# Patient Record
Sex: MALE | Race: WHITE | NOT HISPANIC OR LATINO | Employment: OTHER | URBAN - METROPOLITAN AREA
[De-identification: names, ages, dates, MRNs, and addresses within clinical notes are randomized per-mention and may not be internally consistent; named-entity substitution may affect disease eponyms.]

---

## 2017-08-01 ENCOUNTER — DOCUMENTATION ONLY (OUTPATIENT)
Dept: PRIMARY CARE CLINIC | Facility: CLINIC | Age: 62
End: 2017-08-01

## 2017-08-04 ENCOUNTER — TELEPHONE (OUTPATIENT)
Dept: PRIMARY CARE CLINIC | Facility: CLINIC | Age: 62
End: 2017-08-04

## 2017-08-04 NOTE — TELEPHONE ENCOUNTER
----- Message from Erendira Salas sent at 8/3/2017 12:10 PM CDT -----  [facility]  Faxed over diabetic testing supplies and Lidocaine ointment on 8/2.  Checking on status.  Please call back at

## 2017-08-04 NOTE — TELEPHONE ENCOUNTER
pts pharmacy sent paper refill request to be filled out,signed, and faxed back. Dr Jacobs signed,and filled out refill request. I faxed back to pharm and sent to scan

## 2017-08-23 ENCOUNTER — TELEPHONE (OUTPATIENT)
Dept: PRIMARY CARE CLINIC | Facility: CLINIC | Age: 62
End: 2017-08-23

## 2017-08-23 NOTE — TELEPHONE ENCOUNTER
----- Message from Serena Bryant sent at 8/23/2017  3:05 PM CDT -----  Contact: Laure with [facility] phone [phone] fax [phone]  Laure with [facility] phone [phone] fax [phone], Calling Dr. Jacobs order outpatient physical therapy when he was discharged for [facility], but order is a occupational therapy diagnosis. Order needs to be corrected for occupational therapy or diagnosis needs to be corrected. Please advise. Thanks.

## 2017-09-05 ENCOUNTER — TELEPHONE (OUTPATIENT)
Dept: PRIMARY CARE CLINIC | Facility: CLINIC | Age: 62
End: 2017-09-05

## 2017-09-05 NOTE — TELEPHONE ENCOUNTER
Left message for patient to return call for abnormal CT results- +RIGHT carotid stenosis 60-70 percent subclavian artery loss, LEFT common carotid artery 50 percent of the vessel, please advise patient to f/u with dr giordano or cardiologist.

## 2017-09-06 ENCOUNTER — OFFICE VISIT (OUTPATIENT)
Dept: PRIMARY CARE CLINIC | Facility: CLINIC | Age: 62
End: 2017-09-06
Payer: MEDICARE

## 2017-09-06 VITALS
SYSTOLIC BLOOD PRESSURE: 92 MMHG | DIASTOLIC BLOOD PRESSURE: 57 MMHG | RESPIRATION RATE: 18 BRPM | OXYGEN SATURATION: 96 % | TEMPERATURE: 98 F | HEART RATE: 78 BPM

## 2017-09-06 DIAGNOSIS — I10 ESSENTIAL HYPERTENSION: ICD-10-CM

## 2017-09-06 DIAGNOSIS — Z89.511 S/P BKA (BELOW KNEE AMPUTATION) BILATERAL: ICD-10-CM

## 2017-09-06 DIAGNOSIS — G62.9 NEUROPATHY: ICD-10-CM

## 2017-09-06 DIAGNOSIS — H43.813 PVD (POSTERIOR VITREOUS DETACHMENT), BILATERAL: ICD-10-CM

## 2017-09-06 DIAGNOSIS — F41.9 ANXIETY: ICD-10-CM

## 2017-09-06 DIAGNOSIS — E78.5 HYPERLIPIDEMIA, UNSPECIFIED HYPERLIPIDEMIA TYPE: ICD-10-CM

## 2017-09-06 DIAGNOSIS — F32.A DEPRESSION, UNSPECIFIED DEPRESSION TYPE: ICD-10-CM

## 2017-09-06 DIAGNOSIS — I25.10 CORONARY ARTERY DISEASE, ANGINA PRESENCE UNSPECIFIED, UNSPECIFIED VESSEL OR LESION TYPE, UNSPECIFIED WHETHER NATIVE OR TRANSPLANTED HEART: ICD-10-CM

## 2017-09-06 DIAGNOSIS — Z95.5 HX OF HEART ARTERY STENT: ICD-10-CM

## 2017-09-06 DIAGNOSIS — L98.9 SKIN LESION: ICD-10-CM

## 2017-09-06 DIAGNOSIS — M51.36 DDD (DEGENERATIVE DISC DISEASE), LUMBAR: ICD-10-CM

## 2017-09-06 DIAGNOSIS — Z72.0 TOBACCO ABUSE: ICD-10-CM

## 2017-09-06 DIAGNOSIS — I50.9 CONGESTIVE HEART FAILURE, UNSPECIFIED CONGESTIVE HEART FAILURE CHRONICITY, UNSPECIFIED CONGESTIVE HEART FAILURE TYPE: ICD-10-CM

## 2017-09-06 DIAGNOSIS — Z89.512 S/P BKA (BELOW KNEE AMPUTATION) BILATERAL: ICD-10-CM

## 2017-09-06 DIAGNOSIS — E53.8 B12 DEFICIENCY: ICD-10-CM

## 2017-09-06 PROBLEM — M51.369 DDD (DEGENERATIVE DISC DISEASE), LUMBAR: Status: ACTIVE | Noted: 2017-09-06

## 2017-09-06 PROCEDURE — 99214 OFFICE O/P EST MOD 30 MIN: CPT | Mod: S$GLB,,, | Performed by: FAMILY MEDICINE

## 2017-09-06 RX ORDER — HYDROCODONE BITARTRATE AND ACETAMINOPHEN 10; 325 MG/1; MG/1
1 TABLET ORAL EVERY 6 HOURS PRN
Refills: 0 | COMMUNITY
Start: 2017-06-12

## 2017-09-06 RX ORDER — METFORMIN HYDROCHLORIDE 1000 MG/1
1000 TABLET ORAL 2 TIMES DAILY WITH MEALS
COMMUNITY
End: 2017-12-07 | Stop reason: SDUPTHER

## 2017-09-06 RX ORDER — ROSUVASTATIN CALCIUM 20 MG/1
1 TABLET, COATED ORAL DAILY
Refills: 1 | COMMUNITY
Start: 2017-05-31 | End: 2018-01-11

## 2017-09-06 RX ORDER — INSULIN ASPART 100 [IU]/ML
2 INJECTION, SOLUTION INTRAVENOUS; SUBCUTANEOUS 2 TIMES DAILY
COMMUNITY
End: 2020-09-24

## 2017-09-06 RX ORDER — ALBUTEROL SULFATE 90 UG/1
2 AEROSOL, METERED RESPIRATORY (INHALATION) DAILY
Refills: 2 | COMMUNITY
Start: 2017-05-31 | End: 2017-12-20

## 2017-09-06 RX ORDER — CARVEDILOL 3.12 MG/1
1 TABLET ORAL DAILY
Refills: 1 | COMMUNITY
Start: 2017-05-31 | End: 2018-08-14 | Stop reason: SDUPTHER

## 2017-09-06 RX ORDER — DIAZEPAM 5 MG/1
1 TABLET ORAL DAILY
Refills: 1 | COMMUNITY
Start: 2017-06-12 | End: 2019-11-04

## 2017-09-06 RX ORDER — BACLOFEN 10 MG/1
10 TABLET ORAL
Qty: 90 TABLET | Refills: 1 | Status: SHIPPED | OUTPATIENT
Start: 2017-09-06 | End: 2017-10-11 | Stop reason: SDUPTHER

## 2017-09-06 RX ORDER — NITROGLYCERIN 0.4 MG/1
1 TABLET SUBLINGUAL
Refills: 1 | Status: ON HOLD | COMMUNITY
Start: 2017-05-31 | End: 2020-05-04 | Stop reason: HOSPADM

## 2017-09-06 RX ORDER — INSULIN GLARGINE 100 [IU]/ML
30 INJECTION, SOLUTION SUBCUTANEOUS 2 TIMES DAILY
COMMUNITY
End: 2018-10-15 | Stop reason: SDUPTHER

## 2017-09-06 RX ORDER — BACLOFEN 10 MG/1
10 TABLET ORAL
Qty: 30 TABLET | Refills: 1 | Status: SHIPPED | OUTPATIENT
Start: 2017-09-06 | End: 2017-09-06 | Stop reason: SDUPTHER

## 2017-09-06 RX ORDER — BACLOFEN 10 MG/1
1 TABLET ORAL
Refills: 1 | COMMUNITY
Start: 2017-05-31 | End: 2017-09-06 | Stop reason: SDUPTHER

## 2017-09-06 NOTE — PROGRESS NOTES
Subjective:       Patient ID: Jordan Mcpherson is a 61 y.o. male.    Chief Complaint: Medication Refill and hospital f/u    HPI: 62 yo WM in for F/U -- seen ER due problem with right hand ??pinched nerve. Went into ER admitted due SBPH-- chest pain -- told Dr Ricardo-- did stress test andf 3 stents in heart , Saw Dr Mao told pt had CVA and disc so back in back if no surgery in 6 mo will be in wheelchair rest of life . Pt has appt pain doctor Dr Benito in [place] to get A[place]IN .     ROS:  Skin: no psoriasis, eczema, skin cancer Leison left shoulder scar tissue size 1/2 dollar with verrucoid like tissue upper area.   HEENT: No headache, ocular pain, blurred vision, diplopia, epistaxis, hoarseness change in voice, thyroid trouble  Lung: No pneumonia, asthma, Tb, wheezing, SOB, Smoking 1 ppd   Heart: No chest pain, ankle edema, palpitations, MI, katherine murmur,+ hypertension, +hyperlipidemia, + CAD stent x 3   Abdomen: No nausea, vomiting, diarrhea, constipation, ulcers, hepatitis, gallbladder disease, melena, hematochezia, hematemesis  : no UTI, renal disease, stones, prostate or venereal dz   MS: no fractures, O/A, lupus, rheumatoid, gout  Neuro: No dizziness, LOC, seizures   + diabetes Glu 112 on insulin 30 units bid and metformin 1000mg bid, , no anemia, + anxiety, + depression   x 3 yrs, 2 sons, disabled from right BKA, bad back.      Objective:   Physical Exam:  General: Well nourished, well developed, no acute distress  Skin: scar like tissue left upper deltoid area size 1/2 dollar with raised upper edge   'HEENT: Eyes PERRLA, EOM intact, nose patent, throat non-erythematous   NECK: Supple, no bruits, No JVD, no nodes  Lungs: Clear, no rales, rhonchi, wheezing  Heart: Regular rate and rhythm, no murmurs, gallops, or rubs  Abdomen: flat, bowel sounds positive, no tenderness, or organomegaly  MS: Range of motion and muscle strength intact  Neuro: Alert, CN intact, oriented X 3  Extremities: No cyanosis,  clubbing, or edema         Assessment:       1. Diabetes mellitus, insulin dependent (IDDM), controlled    2. Essential hypertension    3. Hyperlipidemia, unspecified hyperlipidemia type    4. Coronary artery disease, angina presence unspecified, unspecified vessel or lesion type, unspecified whether native or transplanted heart    5. Hx of heart artery stent    6. Congestive heart failure, unspecified congestive heart failure chronicity, unspecified congestive heart failure type    7. PVD (posterior vitreous detachment), bilateral    8. S/P BKA (below knee amputation) bilateral    9. DDD (degenerative disc disease), lumbar    10. Tobacco abuse    11. Anxiety    12. Depression, unspecified depression type    13. Skin lesion    14. B12 deficiency    15. Neuropathy        Plan:       Diabetes mellitus, insulin dependent (IDDM), controlled  -     Hemoglobin A1c; Future; Expected date: 09/06/2017    Essential hypertension  -     CBC auto differential; Future; Expected date: 09/06/2017  -     Comprehensive metabolic panel; Future; Expected date: 09/06/2017  -     X-Ray Chest PA And Lateral; Future; Expected date: 09/06/2017  -     EKG 12-lead; Future  -     Urinalysis    Hyperlipidemia, unspecified hyperlipidemia type  -     Lipid panel; Future; Expected date: 09/06/2017    Coronary artery disease, angina presence unspecified, unspecified vessel or lesion type, unspecified whether native or transplanted heart    Hx of heart artery stent    Congestive heart failure, unspecified congestive heart failure chronicity, unspecified congestive heart failure type    PVD (posterior vitreous detachment), bilateral    S/P BKA (below knee amputation) bilateral    DDD (degenerative disc disease), lumbar    Tobacco abuse    Anxiety    Depression, unspecified depression type    Skin lesion  -     Ambulatory referral to Dermatology    B12 deficiency    Neuropathy  -     Ambulatory Referral to Neurology    Other orders  -     Discontinue:  baclofen (LIORESAL) 10 MG tablet; Take 1 tablet (10 mg total) by mouth three times daily with food.  Dispense: 30 tablet; Refill: 1  -     baclofen (LIORESAL) 10 MG tablet; Take 1 tablet (10 mg total) by mouth three times daily with food.  Dispense: 90 tablet; Refill: 1      Pt able get HLP med he desires bring in big pill or call in name . See Dr Radha hartman, See Pain clinic, Left artificial limp does not fit .

## 2017-10-11 RX ORDER — BACLOFEN 10 MG/1
10 TABLET ORAL
Qty: 90 TABLET | Refills: 1 | Status: SHIPPED | OUTPATIENT
Start: 2017-10-11 | End: 2018-01-08 | Stop reason: SDUPTHER

## 2017-12-07 NOTE — TELEPHONE ENCOUNTER
----- Message from Jil Gilmore sent at 12/7/2017  4:17 PM CST -----  Contact: Lisa Gonzalez called regarding faxes for Dwo submitted couple times with no response. Submitted first time, two weeks ago and second time a week ago. Left multiple messages, no response. Please contact [phone] ext245

## 2017-12-09 RX ORDER — METFORMIN HYDROCHLORIDE 1000 MG/1
TABLET ORAL
Qty: 60 TABLET | Refills: 5 | Status: SHIPPED | OUTPATIENT
Start: 2017-12-09 | End: 2018-10-06 | Stop reason: SDUPTHER

## 2017-12-09 NOTE — TELEPHONE ENCOUNTER
Called so patient needs to do lab as an computer.given 4th months refills give time to get lab and come in and get seen.  Last refill without being seen

## 2018-01-11 ENCOUNTER — OFFICE VISIT (OUTPATIENT)
Dept: PRIMARY CARE CLINIC | Facility: CLINIC | Age: 63
End: 2018-01-11
Payer: MEDICARE

## 2018-01-11 VITALS
HEART RATE: 81 BPM | TEMPERATURE: 98 F | SYSTOLIC BLOOD PRESSURE: 142 MMHG | RESPIRATION RATE: 18 BRPM | WEIGHT: 170 LBS | BODY MASS INDEX: 23.8 KG/M2 | HEIGHT: 71 IN | OXYGEN SATURATION: 96 % | DIASTOLIC BLOOD PRESSURE: 72 MMHG

## 2018-01-11 DIAGNOSIS — R05.3 CHRONIC COUGH: ICD-10-CM

## 2018-01-11 DIAGNOSIS — J20.9 ACUTE BRONCHITIS, UNSPECIFIED ORGANISM: ICD-10-CM

## 2018-01-11 DIAGNOSIS — Z72.0 TOBACCO ABUSE: ICD-10-CM

## 2018-01-11 DIAGNOSIS — J20.9 ACUTE BRONCHITIS, UNSPECIFIED ORGANISM: Primary | ICD-10-CM

## 2018-01-11 PROCEDURE — 99214 OFFICE O/P EST MOD 30 MIN: CPT | Mod: S$GLB,,, | Performed by: FAMILY MEDICINE

## 2018-01-11 PROCEDURE — 99999 PR PBB SHADOW E&M-EST. PATIENT-LVL III: CPT | Mod: PBBFAC,,, | Performed by: FAMILY MEDICINE

## 2018-01-11 RX ORDER — PREDNISONE 20 MG/1
TABLET ORAL
Qty: 15 TABLET | Refills: 0 | Status: SHIPPED | OUTPATIENT
Start: 2018-01-11 | End: 2018-05-31

## 2018-01-11 RX ORDER — FOLIC ACID 1 MG/1
1 TABLET ORAL DAILY
Refills: 5 | COMMUNITY
Start: 2018-01-05 | End: 2018-10-06 | Stop reason: SDUPTHER

## 2018-01-11 RX ORDER — DOXYCYCLINE 100 MG/1
100 CAPSULE ORAL EVERY 12 HOURS
Qty: 20 CAPSULE | Refills: 0 | Status: SHIPPED | OUTPATIENT
Start: 2018-01-11 | End: 2018-01-21

## 2018-01-11 RX ORDER — ATORVASTATIN CALCIUM 80 MG/1
1 TABLET, FILM COATED ORAL DAILY
Status: ON HOLD | COMMUNITY
Start: 2018-01-05 | End: 2020-05-04 | Stop reason: SDUPTHER

## 2018-01-11 RX ORDER — CLOPIDOGREL BISULFATE 75 MG/1
1 TABLET ORAL DAILY
COMMUNITY
Start: 2018-01-05 | End: 2018-04-17 | Stop reason: SDUPTHER

## 2018-01-11 RX ORDER — LISINOPRIL 2.5 MG/1
1 TABLET ORAL DAILY
COMMUNITY
Start: 2018-01-05 | End: 2018-04-17 | Stop reason: SDUPTHER

## 2018-01-11 RX ORDER — ALBUTEROL SULFATE 90 UG/1
2 AEROSOL, METERED RESPIRATORY (INHALATION) EVERY 4 HOURS PRN
Qty: 18 G | Refills: 2 | Status: ON HOLD | OUTPATIENT
Start: 2018-01-11 | End: 2022-05-08 | Stop reason: CLARIF

## 2018-01-13 RX ORDER — BACLOFEN 10 MG/1
10 TABLET ORAL
Qty: 90 TABLET | Refills: 1 | Status: SHIPPED | OUTPATIENT
Start: 2018-01-13 | End: 2018-03-13 | Stop reason: SDUPTHER

## 2018-01-13 NOTE — TELEPHONE ENCOUNTER
Tell patient  6 months refills baclofen called in.  Needs to do lab every 6 months and diabetes needs CBC CMP lipid hemoglobin A1c.  Each year needs urinalysis and urine micral protein.  If over 2 years needs chest x-ray EKG

## 2018-03-13 RX ORDER — BACLOFEN 10 MG/1
10 TABLET ORAL
Qty: 90 TABLET | Refills: 1 | Status: SHIPPED | OUTPATIENT
Start: 2018-03-13 | End: 2018-07-11

## 2018-03-13 NOTE — TELEPHONE ENCOUNTER
Pt had lab orderednSept  2017 b never done Seen Jan 2018 needs get lab next 3 mo see me 2 weeks later Tell in staciee get refills with office visits prevent unnecessary phone calls

## 2018-04-17 RX ORDER — LISINOPRIL 2.5 MG/1
2.5 TABLET ORAL DAILY
Qty: 90 TABLET | Refills: 1 | Status: SHIPPED | OUTPATIENT
Start: 2018-04-17 | End: 2018-07-23 | Stop reason: SDUPTHER

## 2018-04-17 RX ORDER — CLOPIDOGREL BISULFATE 75 MG/1
75 TABLET ORAL DAILY
Qty: 90 TABLET | Refills: 1 | Status: SHIPPED | OUTPATIENT
Start: 2018-04-17 | End: 2018-07-23 | Stop reason: SDUPTHER

## 2018-04-17 RX ORDER — ROSUVASTATIN CALCIUM 20 MG/1
TABLET, COATED ORAL
Qty: 90 TABLET | Refills: 1 | Status: SHIPPED | OUTPATIENT
Start: 2018-04-17 | End: 2018-07-11

## 2018-04-17 NOTE — TELEPHONE ENCOUNTER
----- Message from Erum Amos sent at 4/17/2018  1:13 PM CDT -----  Contact: [facility]  Not originally written by Dr. Morley  Requesting a refill on   Rx clopidogrel (PLAVIX) 75 mg tablet 30 days qty  Rx lisinopril (PRINIVIL,ZESTRIL) 2.5 MG tablet 30 day qty    [facility] [address]  [address] [place] [address]  Phone: [phone] Fax: [phone]    thanks

## 2018-07-11 ENCOUNTER — OFFICE VISIT (OUTPATIENT)
Dept: PRIMARY CARE CLINIC | Facility: CLINIC | Age: 63
End: 2018-07-11
Payer: MEDICARE

## 2018-07-11 VITALS
RESPIRATION RATE: 18 BRPM | HEIGHT: 70 IN | DIASTOLIC BLOOD PRESSURE: 71 MMHG | OXYGEN SATURATION: 99 % | WEIGHT: 175 LBS | SYSTOLIC BLOOD PRESSURE: 139 MMHG | TEMPERATURE: 98 F | HEART RATE: 84 BPM | BODY MASS INDEX: 25.05 KG/M2

## 2018-07-11 DIAGNOSIS — R53.83 FATIGUE, UNSPECIFIED TYPE: ICD-10-CM

## 2018-07-11 DIAGNOSIS — E78.5 TYPE 2 DIABETES MELLITUS WITH HYPERLIPIDEMIA: ICD-10-CM

## 2018-07-11 DIAGNOSIS — I10 ESSENTIAL HYPERTENSION: ICD-10-CM

## 2018-07-11 DIAGNOSIS — E78.5 HYPERLIPIDEMIA, UNSPECIFIED HYPERLIPIDEMIA TYPE: ICD-10-CM

## 2018-07-11 DIAGNOSIS — Z89.512 S/P BKA (BELOW KNEE AMPUTATION) BILATERAL: ICD-10-CM

## 2018-07-11 DIAGNOSIS — Z00.00 ANNUAL PHYSICAL EXAM: Primary | ICD-10-CM

## 2018-07-11 DIAGNOSIS — Z12.5 PROSTATE CANCER SCREENING: ICD-10-CM

## 2018-07-11 DIAGNOSIS — I25.10 CORONARY ARTERY DISEASE, ANGINA PRESENCE UNSPECIFIED, UNSPECIFIED VESSEL OR LESION TYPE, UNSPECIFIED WHETHER NATIVE OR TRANSPLANTED HEART: ICD-10-CM

## 2018-07-11 DIAGNOSIS — Z89.511 S/P BKA (BELOW KNEE AMPUTATION) BILATERAL: ICD-10-CM

## 2018-07-11 DIAGNOSIS — D48.5 NEOPLASM OF UNCERTAIN BEHAVIOR OF SKIN: ICD-10-CM

## 2018-07-11 DIAGNOSIS — E53.8 B12 DEFICIENCY: ICD-10-CM

## 2018-07-11 DIAGNOSIS — Z72.0 TOBACCO ABUSE: ICD-10-CM

## 2018-07-11 DIAGNOSIS — E11.69 TYPE 2 DIABETES MELLITUS WITH HYPERLIPIDEMIA: ICD-10-CM

## 2018-07-11 DIAGNOSIS — Z23 NEED FOR VACCINATION: ICD-10-CM

## 2018-07-11 DIAGNOSIS — Z11.59 NEED FOR HEPATITIS C SCREENING TEST: ICD-10-CM

## 2018-07-11 DIAGNOSIS — Z12.11 COLON CANCER SCREENING: ICD-10-CM

## 2018-07-11 DIAGNOSIS — I73.9 PAD (PERIPHERAL ARTERY DISEASE): ICD-10-CM

## 2018-07-11 PROCEDURE — 3078F DIAST BP <80 MM HG: CPT | Mod: CPTII,S$GLB,, | Performed by: FAMILY MEDICINE

## 2018-07-11 PROCEDURE — 99396 PREV VISIT EST AGE 40-64: CPT | Mod: S$GLB,,, | Performed by: FAMILY MEDICINE

## 2018-07-11 PROCEDURE — 3075F SYST BP GE 130 - 139MM HG: CPT | Mod: CPTII,S$GLB,, | Performed by: FAMILY MEDICINE

## 2018-07-11 PROCEDURE — G0009 ADMIN PNEUMOCOCCAL VACCINE: HCPCS | Mod: S$GLB,,, | Performed by: FAMILY MEDICINE

## 2018-07-11 PROCEDURE — 90732 PPSV23 VACC 2 YRS+ SUBQ/IM: CPT | Mod: S$GLB,,, | Performed by: FAMILY MEDICINE

## 2018-07-11 PROCEDURE — 99999 PR PBB SHADOW E&M-EST. PATIENT-LVL V: CPT | Mod: PBBFAC,,, | Performed by: FAMILY MEDICINE

## 2018-07-11 RX ORDER — BACLOFEN 20 MG/1
20 TABLET ORAL
Refills: 1 | COMMUNITY
Start: 2018-07-06

## 2018-07-11 NOTE — PROGRESS NOTES
"Subjective:       Patient ID: Jordan Mcpherson is a 62 y.o. male.    Chief Complaint: Annual Exam    Here for annual checkup.  Has not been checking his blood sugar and months because he says he does not have a little calmer.  Says he is taking his medications as prescribed.  Underwent left heart catheterization by Dr. Ricardo late last year, had 3 stents placed.  Since then, he denies any recurrent chest pain or shortness of breath.  However, he continues to smoke heavily, about 1 and half packs daily, says he has no interest in quitting smoking.  Has chronic back pain, followed by pain management.  Patient is status post bilateral below-the-knee amputations due to complications from peripheral arterial disease.      Review of Systems   Constitutional: Positive for fatigue. Negative for chills and fever.   HENT: Negative for congestion.    Eyes: Negative for visual disturbance.   Respiratory: Negative for cough and shortness of breath.    Cardiovascular: Negative for chest pain and palpitations.   Gastrointestinal: Positive for constipation. Negative for abdominal pain, blood in stool, nausea and vomiting.   Endocrine: Negative for polydipsia and polyuria.   Genitourinary: Negative for difficulty urinating.   Musculoskeletal: Positive for arthralgias and back pain.   Skin: Negative for rash.   Neurological: Negative for dizziness.   Hematological: Bruises/bleeds easily.   Psychiatric/Behavioral: Negative for self-injury and suicidal ideas. The patient is nervous/anxious.        Objective:      Vitals:    07/11/18 1333   BP: 139/71   BP Location: Left arm   Patient Position: Sitting   BP Method: Medium (Automatic)   Pulse: 84   Resp: 18   Temp: 98.2 °F (36.8 °C)   TempSrc: Oral   SpO2: 99%   Weight: 79.4 kg (175 lb)   Height: 5' 10" (1.778 m)     Physical Exam   Constitutional: He is oriented to person, place, and time. He appears well-developed and well-nourished.   HENT:   Head: Normocephalic and atraumatic. "   Mouth/Throat: Oropharynx is clear and moist.   Eyes: EOM are normal. Pupils are equal, round, and reactive to light.   Neck: Neck supple. No JVD present. Carotid bruit is not present.   Cardiovascular: Normal rate, regular rhythm and normal heart sounds.    Pulses:       Radial pulses are 2+ on the right side, and 2+ on the left side.   Pulmonary/Chest: Effort normal and breath sounds normal.   Abdominal: Soft. Bowel sounds are normal. There is no tenderness.   Musculoskeletal:   Bilateral BKA with right-sided prosthesis   Neurological: He is alert and oriented to person, place, and time.   Skin: Skin is warm and dry.   Erythematous, crusted lesion to helix of left ear   Psychiatric: He has a normal mood and affect. His behavior is normal.   Nursing note and vitals reviewed.      Assessment:       1. Annual physical exam    2. Need for hepatitis C screening test    3. Colon cancer screening    4. Prostate cancer screening    5. Need for vaccination    6. Type 2 diabetes mellitus with hyperlipidemia    7. B12 deficiency    8. Hyperlipidemia, unspecified hyperlipidemia type    9. Coronary artery disease, angina presence unspecified, unspecified vessel or lesion type, unspecified whether native or transplanted heart    10. Essential hypertension    11. S/P BKA (below knee amputation) bilateral    12. PAD (peripheral artery disease)    13. Tobacco abuse    14. Fatigue, unspecified type    15. Neoplasm of uncertain behavior of skin        Plan:       Annual physical exam  -     Cancel: CBC auto differential; Future; Expected date: 07/11/2018  -     Comprehensive metabolic panel; Future; Expected date: 07/11/2018  -     Lipid panel; Future; Expected date: 07/11/2018  -     Hemoglobin A1c; Future; Expected date: 07/11/2018  -     TSH; Future; Expected date: 07/11/2018  -     PSA, Screening; Future; Expected date: 07/11/2018  -     CBC auto differential; Future; Expected date: 07/11/2018    Need for hepatitis C screening  test  -     Hepatitis C antibody; Future; Expected date: 07/11/2018    Colon cancer screening  -     Ambulatory referral to Colorectal Surgery    Prostate cancer screening  -     PSA, Screening; Future; Expected date: 07/11/2018    Need for vaccination  -     Pneumococcal Polysaccharide Vaccine (23 Valent) (SQ/IM)    Type 2 diabetes mellitus with hyperlipidemia  -     Hemoglobin A1c; Future; Expected date: 07/11/2018  -     Microalbumin/creatinine urine ratio; Future; Expected date: 07/11/2018  -     Ambulatory Referral to Diabetes Education    B12 deficiency  -     Vitamin B12; Future; Expected date: 07/11/2018    Hyperlipidemia, unspecified hyperlipidemia type  -     Comprehensive metabolic panel; Future; Expected date: 07/11/2018  -     Lipid panel; Future; Expected date: 07/11/2018  -     TSH; Future; Expected date: 07/11/2018    Coronary artery disease, angina presence unspecified, unspecified vessel or lesion type, unspecified whether native or transplanted heart  -     Cancel: CBC auto differential; Future; Expected date: 07/11/2018  -     CBC auto differential; Future; Expected date: 07/11/2018    Essential hypertension    S/P BKA (below knee amputation) bilateral    PAD (peripheral artery disease)    Tobacco abuse  Comments:  pt refuses to quit, convinced he will die or get cancer if he quits due to family members' experiences  Orders:  -     Ambulatory referral to Smoking Cessation Program    Fatigue, unspecified type  -     TESTOSTERONE PANEL; Future; Expected date: 07/11/2018    Neoplasm of uncertain behavior of skin  -     Ambulatory referral to Dermatology      Medication List with Changes/Refills   Current Medications    ALBUTEROL (VENTOLIN HFA) 90 MCG/ACTUATION INHALER    Inhale 2 puffs into the lungs every 4 (four) hours as needed for Wheezing or Shortness of Breath.    ASPIRIN (ECOTRIN) 81 MG EC TABLET    Take 81 mg by mouth once daily.    ATORVASTATIN (LIPITOR) 80 MG TABLET    Take 1 tablet by  mouth once daily.    BACLOFEN (LIORESAL) 20 MG TABLET    Take 20 mg by mouth 4 (four) times daily.     CARVEDILOL (COREG) 3.125 MG TABLET    Take 1 tablet by mouth once daily.    CLOPIDOGREL (PLAVIX) 75 MG TABLET    Take 1 tablet (75 mg total) by mouth once daily.    DIAZEPAM (VALIUM) 5 MG TABLET    Take 1 tablet by mouth once daily.    FOLIC ACID (FOLVITE) 1 MG TABLET    Take 1 tablet by mouth once daily.    HYDROCODONE-ACETAMINOPHEN 10-325MG (NORCO)  MG TAB    Take 1 tablet by mouth once daily.    INSULIN ASPART (NOVOLOG FLEXPEN) 100 UNIT/ML INPN PEN    Inject 2 Units into the skin 2 (two) times daily.    INSULIN GLARGINE (LANTUS SOLOSTAR) 100 UNIT/ML (3 ML) INPN PEN    Inject 30 Units into the skin 2 (two) times daily.    LISINOPRIL (PRINIVIL,ZESTRIL) 2.5 MG TABLET    Take 1 tablet (2.5 mg total) by mouth once daily.    METFORMIN (GLUCOPHAGE) 1000 MG TABLET    TAKE ONE TABLET BY MOUTH TWICE DAILY WITH MEALS    NITROGLYCERIN (NITROSTAT) 0.4 MG SL TABLET    Place 1 tablet under the tongue as needed.   Discontinued Medications    BACLOFEN (LIORESAL) 10 MG TABLET    TAKE 1 TABLET (10 MG TOTAL) BY MOUTH THREE TIMES DAILY WITH FOOD.    LACTULOSE (CHRONULAC) 20 GRAM/30 ML SOLN    Take 15 mLs (10 g total) by mouth 2 (two) times daily as needed (Constipation).    ONDANSETRON (ZOFRAN) 4 MG TABLET    Take 1 tablet (4 mg total) by mouth every 6 (six) hours as needed.    ROSUVASTATIN (CRESTOR) 20 MG TABLET    TAKE ONE TABLET BY MOUTH DAILY FOR CHOLESTEROL. STOP SIMVASTATIN

## 2018-07-11 NOTE — PROGRESS NOTES
Patient ID by name and . NKDA. Pneumonia 23 vaccine given IM in right deltoid using aseptic technique. Aspirated with no blood noted. Patient tolerated well. Given per physicians order. No adverse reaction noted.

## 2018-07-25 ENCOUNTER — TELEPHONE (OUTPATIENT)
Dept: PRIMARY CARE CLINIC | Facility: CLINIC | Age: 63
End: 2018-07-25

## 2018-07-25 NOTE — TELEPHONE ENCOUNTER
Called pt. To let him no that he still should have a 90 day refill at [facility]. No answer left  Detailed vm stating he still has a refill.

## 2018-07-25 NOTE — TELEPHONE ENCOUNTER
----- Message from Richard Tomas sent at 7/25/2018  9:25 AM CDT -----  Contact: self   Patient called to check status of rx lisinopril (PRINIVIL,ZESTRIL) 2.5 MG tablet that he needs a refill on. Please send to [facility]. Please call back at [phone] (home)          [facility] - [facility] [address]  [address] [place] [address]  Phone: [phone] Fax: [phone]

## 2018-07-26 RX ORDER — LISINOPRIL 2.5 MG/1
TABLET ORAL
Qty: 90 TABLET | Refills: 1 | Status: SHIPPED | OUTPATIENT
Start: 2018-07-26 | End: 2019-02-11 | Stop reason: SDUPTHER

## 2018-07-26 RX ORDER — CLOPIDOGREL BISULFATE 75 MG/1
TABLET ORAL
Qty: 90 TABLET | Refills: 1 | Status: SHIPPED | OUTPATIENT
Start: 2018-07-26 | End: 2019-02-11 | Stop reason: SDUPTHER

## 2018-08-14 ENCOUNTER — OFFICE VISIT (OUTPATIENT)
Dept: PRIMARY CARE CLINIC | Facility: CLINIC | Age: 63
End: 2018-08-14
Payer: MEDICARE

## 2018-08-14 VITALS
TEMPERATURE: 98 F | OXYGEN SATURATION: 95 % | SYSTOLIC BLOOD PRESSURE: 125 MMHG | WEIGHT: 170 LBS | HEIGHT: 70 IN | DIASTOLIC BLOOD PRESSURE: 73 MMHG | HEART RATE: 74 BPM | BODY MASS INDEX: 24.34 KG/M2 | RESPIRATION RATE: 18 BRPM

## 2018-08-14 DIAGNOSIS — E11.29 MICROALBUMINURIA DUE TO TYPE 2 DIABETES MELLITUS: ICD-10-CM

## 2018-08-14 DIAGNOSIS — E11.69 TYPE 2 DIABETES MELLITUS WITH HYPERLIPIDEMIA: Primary | ICD-10-CM

## 2018-08-14 DIAGNOSIS — I10 ESSENTIAL HYPERTENSION: ICD-10-CM

## 2018-08-14 DIAGNOSIS — R80.9 MICROALBUMINURIA DUE TO TYPE 2 DIABETES MELLITUS: ICD-10-CM

## 2018-08-14 DIAGNOSIS — E53.8 B12 DEFICIENCY: ICD-10-CM

## 2018-08-14 DIAGNOSIS — E78.5 TYPE 2 DIABETES MELLITUS WITH HYPERLIPIDEMIA: Primary | ICD-10-CM

## 2018-08-14 PROCEDURE — 3078F DIAST BP <80 MM HG: CPT | Mod: CPTII,S$GLB,, | Performed by: FAMILY MEDICINE

## 2018-08-14 PROCEDURE — 96372 THER/PROPH/DIAG INJ SC/IM: CPT | Mod: S$GLB,,, | Performed by: FAMILY MEDICINE

## 2018-08-14 PROCEDURE — 99214 OFFICE O/P EST MOD 30 MIN: CPT | Mod: 25,S$GLB,, | Performed by: FAMILY MEDICINE

## 2018-08-14 PROCEDURE — 99999 PR PBB SHADOW E&M-EST. PATIENT-LVL III: CPT | Mod: PBBFAC,,, | Performed by: FAMILY MEDICINE

## 2018-08-14 PROCEDURE — 3074F SYST BP LT 130 MM HG: CPT | Mod: CPTII,S$GLB,, | Performed by: FAMILY MEDICINE

## 2018-08-14 PROCEDURE — 3008F BODY MASS INDEX DOCD: CPT | Mod: CPTII,S$GLB,, | Performed by: FAMILY MEDICINE

## 2018-08-14 PROCEDURE — 3046F HEMOGLOBIN A1C LEVEL >9.0%: CPT | Mod: CPTII,S$GLB,, | Performed by: FAMILY MEDICINE

## 2018-08-14 RX ORDER — CARVEDILOL 3.12 MG/1
3.12 TABLET ORAL 2 TIMES DAILY
Qty: 180 TABLET | Refills: 1 | Status: SHIPPED | OUTPATIENT
Start: 2018-08-14 | End: 2019-01-02 | Stop reason: SDUPTHER

## 2018-08-14 RX ORDER — CYANOCOBALAMIN 1000 UG/ML
1000 INJECTION, SOLUTION INTRAMUSCULAR; SUBCUTANEOUS
Status: COMPLETED | OUTPATIENT
Start: 2018-08-14 | End: 2018-08-14

## 2018-08-14 RX ORDER — CYANOCOBALAMIN 1000 UG/ML
INJECTION, SOLUTION INTRAMUSCULAR; SUBCUTANEOUS
Qty: 10 ML | Refills: 5 | Status: SHIPPED | OUTPATIENT
Start: 2018-08-14 | End: 2018-09-05 | Stop reason: ALTCHOICE

## 2018-08-14 RX ADMIN — CYANOCOBALAMIN 1000 MCG: 1000 INJECTION, SOLUTION INTRAMUSCULAR; SUBCUTANEOUS at 04:08

## 2018-08-14 NOTE — PROGRESS NOTES
Pt ID verified by  and Name. Allergies verified. B12 1cc administered IM to R deltoid per MD order. No adverse reactions noted. Pt tolerated well.

## 2018-08-14 NOTE — PROGRESS NOTES
"Subjective:       Patient ID: Jordan Mcpherson is a 62 y.o. male.    Chief Complaint: Diabetes (patient is here to discuss lab results )    Diabetes   He presents for his follow-up diabetic visit. He has type 2 diabetes mellitus. There are no hypoglycemic associated symptoms. Pertinent negatives for hypoglycemia include no confusion. Pertinent negatives for diabetes include no chest pain, no polydipsia and no polyuria. There are no hypoglycemic complications. Symptoms are stable. Diabetic complications include peripheral neuropathy and PVD. He is compliant with treatment most of the time. His weight is stable. There is no change in his home blood glucose trend. His breakfast blood glucose range is generally 180-200 mg/dl. An ACE inhibitor/angiotensin II receptor blocker is being taken.    Mild microalbuminuria on recent urinalysis.  Already on Ace inhibitor.  CBC within normal limits, but B12 level low.  Hypertension-compliant with medications, blood pressure well controlled  Review of Systems   Constitutional: Negative for fever and unexpected weight change.   HENT: Negative for trouble swallowing.    Eyes: Negative for visual disturbance.   Respiratory: Negative for shortness of breath.    Cardiovascular: Negative for chest pain.   Endocrine: Negative for polydipsia and polyuria.   Genitourinary: Negative for difficulty urinating.   Musculoskeletal: Positive for back pain.   Skin: Negative for rash.   Neurological: Positive for numbness.   Psychiatric/Behavioral: Negative for agitation and confusion.       Objective:      Vitals:    08/14/18 1528   BP: 125/73   BP Location: Right arm   Patient Position: Sitting   BP Method: Medium (Automatic)   Pulse: 74   Resp: 18   Temp: 97.9 °F (36.6 °C)   TempSrc: Oral   SpO2: 95%   Weight: 77.1 kg (170 lb)   Height: 5' 10" (1.778 m)     Lab Results   Component Value Date    WBC 11.00 07/13/2018    HGB 14.6 07/13/2018    HCT 43.0 07/13/2018     07/13/2018    CHOL 84 " 07/13/2018    TRIG 77 07/13/2018    HDL 34 (L) 07/13/2018    ALT 22 07/13/2018    AST 22 07/13/2018     (L) 07/13/2018    K 4.5 07/13/2018     07/13/2018    CREATININE 0.8 07/13/2018    BUN 20 07/13/2018    CO2 28 07/13/2018    TSH 1.98 07/13/2018    PSA 0.67 07/13/2018    HGBA1C 9.5 (H) 07/13/2018     Physical Exam   Constitutional: He is oriented to person, place, and time. He appears well-developed and well-nourished.   HENT:   Head: Normocephalic and atraumatic.   Mouth/Throat: Oropharynx is clear and moist.   Eyes: EOM are normal. Pupils are equal, round, and reactive to light.   Neck: Neck supple. No JVD present. Carotid bruit is not present.   Cardiovascular: Normal rate, regular rhythm and normal heart sounds.   Pulses:       Radial pulses are 2+ on the right side, and 2+ on the left side.   Pulmonary/Chest: Effort normal and breath sounds normal.   Abdominal: Soft. Bowel sounds are normal. There is no tenderness.   Musculoskeletal:   Bilateral BKA with right-sided prosthesis   Neurological: He is alert and oriented to person, place, and time.   Skin: Skin is warm and dry.   Erythematous, crusted lesion to helix of left ear   Psychiatric: He has a normal mood and affect. His behavior is normal.   Nursing note and vitals reviewed.      Assessment:       1. Type 2 diabetes mellitus with hyperlipidemia    2. B12 deficiency    3. Microalbuminuria due to type 2 diabetes mellitus    4. Essential hypertension        Plan:       Type 2 diabetes mellitus with hyperlipidemia  -     empagliflozin (JARDIANCE) 25 mg Tab; Take 25 mg by mouth once daily.  Dispense: 30 tablet; Refill: 5  -     Basic metabolic panel; Future; Expected date: 11/14/2018  -     Hemoglobin A1c; Future; Expected date: 11/14/2018    B12 deficiency  -     cyanocobalamin 1,000 mcg/mL injection; 1 cc SQ daily x 1 week, weekly x 4 weeks, then once a month  Dispense: 10 mL; Refill: 5  -     CBC auto differential; Future; Expected date:  11/14/2018  -     Vitamin B12; Future; Expected date: 11/14/2018  -     cyanocobalamin injection 1,000 mcg; Inject 1 mL (1,000 mcg total) into the muscle one time.    Microalbuminuria due to type 2 diabetes mellitus    Essential hypertension  -     carvedilol (COREG) 3.125 MG tablet; Take 1 tablet (3.125 mg total) by mouth 2 (two) times daily.  Dispense: 180 tablet; Refill: 1      Medication List with Changes/Refills   New Medications    CYANOCOBALAMIN 1,000 MCG/ML INJECTION    1 cc SQ daily x 1 week, weekly x 4 weeks, then once a month    EMPAGLIFLOZIN (JARDIANCE) 25 MG TAB    Take 25 mg by mouth once daily.   Current Medications    ALBUTEROL (VENTOLIN HFA) 90 MCG/ACTUATION INHALER    Inhale 2 puffs into the lungs every 4 (four) hours as needed for Wheezing or Shortness of Breath.    ASPIRIN (ECOTRIN) 81 MG EC TABLET    Take 81 mg by mouth once daily.    ATORVASTATIN (LIPITOR) 80 MG TABLET    Take 1 tablet by mouth once daily.    BACLOFEN (LIORESAL) 20 MG TABLET    Take 20 mg by mouth 4 (four) times daily.     BLOOD SUGAR DIAGNOSTIC (TRUE METRIX GLUCOSE TEST STRIP) STRP    1 strip by Misc.(Non-Drug; Combo Route) route 2 (two) times daily before meals.    BLOOD-GLUCOSE METER (TRUE METRIX GLUCOSE METER) MISC    1 kit by Misc.(Non-Drug; Combo Route) route 2 (two) times daily.    CLOPIDOGREL (PLAVIX) 75 MG TABLET    TAKE ONE TABLET BY MOUTH ONCE DAILY    DIAZEPAM (VALIUM) 5 MG TABLET    Take 1 tablet by mouth once daily.    FOLIC ACID (FOLVITE) 1 MG TABLET    Take 1 tablet by mouth once daily.    HYDROCODONE-ACETAMINOPHEN 10-325MG (NORCO)  MG TAB    Take 1 tablet by mouth once daily.    INSULIN ASPART (NOVOLOG FLEXPEN) 100 UNIT/ML INPN PEN    Inject 2 Units into the skin 2 (two) times daily.    INSULIN GLARGINE (LANTUS SOLOSTAR) 100 UNIT/ML (3 ML) INPN PEN    Inject 30 Units into the skin 2 (two) times daily.    LANCETS (TRUEPLUS LANCETS) 33 GAUGE MISC    1 lancet by Misc.(Non-Drug; Combo Route) route 2 (two)  times daily before meals.    LISINOPRIL (PRINIVIL,ZESTRIL) 2.5 MG TABLET    TAKE ONE TABLET BY MOUTH ONCE DAILY    METFORMIN (GLUCOPHAGE) 1000 MG TABLET    TAKE ONE TABLET BY MOUTH TWICE DAILY WITH MEALS    NITROGLYCERIN (NITROSTAT) 0.4 MG SL TABLET    Place 1 tablet under the tongue as needed.   Changed and/or Refilled Medications    Modified Medication Previous Medication    CARVEDILOL (COREG) 3.125 MG TABLET carvedilol (COREG) 3.125 MG tablet       Take 1 tablet (3.125 mg total) by mouth 2 (two) times daily.    Take 1 tablet by mouth once daily.

## 2018-08-17 ENCOUNTER — TELEPHONE (OUTPATIENT)
Dept: PRIMARY CARE CLINIC | Facility: CLINIC | Age: 63
End: 2018-08-17

## 2018-08-17 DIAGNOSIS — E53.8 B12 DEFICIENCY: Primary | ICD-10-CM

## 2018-08-17 NOTE — TELEPHONE ENCOUNTER
----- Message from Serena Bryant sent at 8/17/2018 10:00 AM CDT -----  Contact: Patient  Type: Needs Medical Advice    Who Called:  Jordan patient  Symptoms (please be specific):  N/A  How long has patient had these symptoms:  N/A  Pharmacy name and phone #:    [facility] - [address]  [address] [place] [address]  Phone: [phone] Fax: [phone]  Best Call Back Number: [phone]  Additional Information: Calling because Rx cyanocobalamin 1,000 mcg/mL injection needs a  prior authorization. Please advise. Thanks.

## 2018-09-05 ENCOUNTER — CLINICAL SUPPORT (OUTPATIENT)
Dept: PRIMARY CARE CLINIC | Facility: CLINIC | Age: 63
End: 2018-09-05
Payer: MEDICARE

## 2018-09-05 DIAGNOSIS — E53.8 VITAMIN B12 DEFICIENCY DISEASE: Primary | ICD-10-CM

## 2018-09-05 PROCEDURE — 96372 THER/PROPH/DIAG INJ SC/IM: CPT | Mod: PBBFAC,PN

## 2018-09-05 RX ORDER — METFORMIN HYDROCHLORIDE 1000 MG/1
TABLET ORAL
Qty: 60 TABLET | Refills: 5 | OUTPATIENT
Start: 2018-09-05

## 2018-09-05 RX ORDER — FOLIC ACID 1 MG/1
TABLET ORAL
Qty: 100 TABLET | Refills: 5 | OUTPATIENT
Start: 2018-09-05

## 2018-09-05 RX ORDER — CYANOCOBALAMIN 1000 UG/ML
1000 INJECTION, SOLUTION INTRAMUSCULAR; SUBCUTANEOUS DAILY
Status: DISCONTINUED | OUTPATIENT
Start: 2018-09-05 | End: 2018-11-14

## 2018-09-05 RX ADMIN — CYANOCOBALAMIN 1000 MCG: 1000 INJECTION INTRAMUSCULAR; SUBCUTANEOUS at 02:09

## 2018-09-05 NOTE — PROGRESS NOTES
Patient ID by name and . NKDA. B12 1000 mcg IM injection given in right deltoid using aseptic technique. Aspirated with no blood noted. Patient tolerated well. Given per physicians order. No adverse reaction noted. Patient is to get B12 1 cc daily x 1 week and then once a week x 4 and then once a month.

## 2018-09-06 ENCOUNTER — CLINICAL SUPPORT (OUTPATIENT)
Dept: PRIMARY CARE CLINIC | Facility: CLINIC | Age: 63
End: 2018-09-06
Payer: MEDICARE

## 2018-09-06 PROCEDURE — 96372 THER/PROPH/DIAG INJ SC/IM: CPT | Mod: PBBFAC,PN

## 2018-09-06 RX ADMIN — CYANOCOBALAMIN 1000 MCG: 1000 INJECTION INTRAMUSCULAR; SUBCUTANEOUS at 11:09

## 2018-09-06 NOTE — PROGRESS NOTES
Patient ID by name and . NKDA. B12 1000 mcg IM injection given in left deltoid using aseptic technique. Aspirated with no blood noted. Patient tolerated well. Given per physicians order. No adverse reaction noted.

## 2018-09-07 ENCOUNTER — CLINICAL SUPPORT (OUTPATIENT)
Dept: PRIMARY CARE CLINIC | Facility: CLINIC | Age: 63
End: 2018-09-07
Payer: MEDICARE

## 2018-09-07 PROCEDURE — 96372 THER/PROPH/DIAG INJ SC/IM: CPT | Mod: PBBFAC,PN

## 2018-09-07 RX ADMIN — CYANOCOBALAMIN 1000 MCG: 1000 INJECTION INTRAMUSCULAR; SUBCUTANEOUS at 12:09

## 2018-09-14 ENCOUNTER — TELEPHONE (OUTPATIENT)
Dept: PRIMARY CARE CLINIC | Facility: CLINIC | Age: 63
End: 2018-09-14

## 2018-09-14 NOTE — TELEPHONE ENCOUNTER
Patient notified that I do not see who called him. He derrek he was coming in for his b12 shot today

## 2018-09-14 NOTE — TELEPHONE ENCOUNTER
----- Message from Serena Bryant sent at 9/14/2018 10:43 AM CDT -----  Contact: Patient  Type:  Patient Returning Call    Who Called:  rosanne Ortega  Who Left Message for Patient:  ?  Does the patient know what this is regarding?:  ?  Best Call Back Number:  [phone]  Additional Information:  Missed your call, please call her back. Thanks.

## 2018-09-17 ENCOUNTER — CLINICAL SUPPORT (OUTPATIENT)
Dept: PRIMARY CARE CLINIC | Facility: CLINIC | Age: 63
End: 2018-09-17
Payer: MEDICARE

## 2018-09-17 PROCEDURE — 96372 THER/PROPH/DIAG INJ SC/IM: CPT | Mod: PBBFAC,PN

## 2018-09-17 RX ADMIN — CYANOCOBALAMIN 1000 MCG: 1000 INJECTION INTRAMUSCULAR; SUBCUTANEOUS at 12:09

## 2018-09-17 NOTE — PROGRESS NOTES
Patient ID by name and . NKDA. B12 1000 mcg IM injection given in right deltoid using aseptic technique. Aspirated with no blood noted. Patient tolerated well. Given per physicians order. No adverse reactions noted.

## 2018-10-07 RX ORDER — METFORMIN HYDROCHLORIDE 1000 MG/1
TABLET ORAL
Qty: 60 TABLET | Refills: 5 | Status: SHIPPED | OUTPATIENT
Start: 2018-10-07 | End: 2019-04-08 | Stop reason: SDUPTHER

## 2018-10-07 RX ORDER — FOLIC ACID 1 MG/1
TABLET ORAL
Qty: 30 TABLET | Refills: 5 | Status: SHIPPED | OUTPATIENT
Start: 2018-10-07 | End: 2019-05-17 | Stop reason: SDUPTHER

## 2018-10-16 RX ORDER — INSULIN GLARGINE 100 [IU]/ML
INJECTION, SOLUTION SUBCUTANEOUS
Qty: 45 ML | Refills: 5 | Status: SHIPPED | OUTPATIENT
Start: 2018-10-16 | End: 2020-09-24

## 2018-11-06 RX ORDER — ROSUVASTATIN CALCIUM 20 MG/1
TABLET, COATED ORAL
Qty: 90 TABLET | Refills: 1 | Status: SHIPPED | OUTPATIENT
Start: 2018-11-06 | End: 2019-05-17 | Stop reason: SDUPTHER

## 2018-11-12 ENCOUNTER — TELEPHONE (OUTPATIENT)
Dept: SURGERY | Facility: CLINIC | Age: 63
End: 2018-11-12

## 2018-11-12 DIAGNOSIS — Z12.11 SCREEN FOR COLON CANCER: Primary | ICD-10-CM

## 2018-11-12 RX ORDER — SODIUM CHLORIDE 0.9 % (FLUSH) 0.9 %
3 SYRINGE (ML) INJECTION
Status: CANCELLED | OUTPATIENT
Start: 2018-11-12

## 2018-11-12 RX ORDER — SODIUM CHLORIDE, SODIUM LACTATE, POTASSIUM CHLORIDE, CALCIUM CHLORIDE 600; 310; 30; 20 MG/100ML; MG/100ML; MG/100ML; MG/100ML
INJECTION, SOLUTION INTRAVENOUS CONTINUOUS
Status: CANCELLED | OUTPATIENT
Start: 2018-11-12

## 2018-11-12 NOTE — TELEPHONE ENCOUNTER
----- Message from Pedro Alves LPN sent at 10/1/2018 12:55 PM CDT -----  Regarding: 10/1/18 LVM for scope?  Message   Received: 2 months ago   Message Contents   CORBY Mendoza LPN         Please contact patient to schedule a colonoscopy     Thank you

## 2018-11-12 NOTE — TELEPHONE ENCOUNTER
Colonoscopy is scheduled for 12/13/18 arrival time per the preop nurse.  Preop will call from [phone]  Fasting after midnight  Someone to drive you home      THE PREOP NURSE WILL CALL, SOMETIMES AS LATE AS 4 PM IN THE AFTERNOON THE DAY BEFORE SURGERY.    Bowel Prep is included with this letter, call Pedro if you have any questions or concerns or if you need to reschedule your procedure at [phone].  Colonoscopy is at the [facility].

## 2018-11-12 NOTE — TELEPHONE ENCOUNTER
I called patient and Pedro will schedule his colonoscopy at the [facility] [place].  [place]

## 2018-11-14 ENCOUNTER — OFFICE VISIT (OUTPATIENT)
Dept: PRIMARY CARE CLINIC | Facility: CLINIC | Age: 63
End: 2018-11-14
Payer: MEDICARE

## 2018-11-14 VITALS
WEIGHT: 170 LBS | OXYGEN SATURATION: 96 % | TEMPERATURE: 98 F | SYSTOLIC BLOOD PRESSURE: 139 MMHG | BODY MASS INDEX: 24.39 KG/M2 | RESPIRATION RATE: 16 BRPM | HEART RATE: 79 BPM | DIASTOLIC BLOOD PRESSURE: 84 MMHG

## 2018-11-14 DIAGNOSIS — I10 ESSENTIAL HYPERTENSION: ICD-10-CM

## 2018-11-14 DIAGNOSIS — E78.5 TYPE 2 DIABETES MELLITUS WITH HYPERLIPIDEMIA: Primary | ICD-10-CM

## 2018-11-14 DIAGNOSIS — Z23 NEED FOR VACCINATION: ICD-10-CM

## 2018-11-14 DIAGNOSIS — E11.69 TYPE 2 DIABETES MELLITUS WITH HYPERLIPIDEMIA: Primary | ICD-10-CM

## 2018-11-14 DIAGNOSIS — Z13.5 DIABETIC RETINOPATHY SCREENING: ICD-10-CM

## 2018-11-14 DIAGNOSIS — I25.10 CORONARY ARTERY DISEASE, ANGINA PRESENCE UNSPECIFIED, UNSPECIFIED VESSEL OR LESION TYPE, UNSPECIFIED WHETHER NATIVE OR TRANSPLANTED HEART: ICD-10-CM

## 2018-11-14 DIAGNOSIS — I73.9 PAD (PERIPHERAL ARTERY DISEASE): ICD-10-CM

## 2018-11-14 PROCEDURE — 99213 OFFICE O/P EST LOW 20 MIN: CPT | Mod: 25,S$GLB,, | Performed by: FAMILY MEDICINE

## 2018-11-14 PROCEDURE — 3075F SYST BP GE 130 - 139MM HG: CPT | Mod: CPTII,S$GLB,, | Performed by: FAMILY MEDICINE

## 2018-11-14 PROCEDURE — 3079F DIAST BP 80-89 MM HG: CPT | Mod: CPTII,S$GLB,, | Performed by: FAMILY MEDICINE

## 2018-11-14 PROCEDURE — 90686 IIV4 VACC NO PRSV 0.5 ML IM: CPT | Mod: S$GLB,,, | Performed by: FAMILY MEDICINE

## 2018-11-14 PROCEDURE — 90714 TD VACC NO PRESV 7 YRS+ IM: CPT | Mod: S$GLB,,, | Performed by: FAMILY MEDICINE

## 2018-11-14 PROCEDURE — 3046F HEMOGLOBIN A1C LEVEL >9.0%: CPT | Mod: CPTII,S$GLB,, | Performed by: FAMILY MEDICINE

## 2018-11-14 PROCEDURE — 3008F BODY MASS INDEX DOCD: CPT | Mod: CPTII,S$GLB,, | Performed by: FAMILY MEDICINE

## 2018-11-14 PROCEDURE — 90471 IMMUNIZATION ADMIN: CPT | Mod: 59,S$GLB,, | Performed by: FAMILY MEDICINE

## 2018-11-14 PROCEDURE — 99999 PR PBB SHADOW E&M-EST. PATIENT-LVL III: CPT | Mod: PBBFAC,,, | Performed by: FAMILY MEDICINE

## 2018-11-14 PROCEDURE — G0008 ADMIN INFLUENZA VIRUS VAC: HCPCS | Mod: 59,S$GLB,, | Performed by: FAMILY MEDICINE

## 2018-11-14 NOTE — PROGRESS NOTES
Patient verified by name and . Allergies verified by patient. Administered Flu Vaccine 0.5ml IM to Right Deltoid and TD 0.5ml IM to Left Deltoid using aseptic technique per physician's order. Aspirated with no blood noted. Patient tolerated well with no adverse effects.

## 2018-11-14 NOTE — PROGRESS NOTES
Subjective:       Patient ID: Jordan Mcpherson is a 63 y.o. male.    Chief Complaint: Diabetes (patient is here for a 3 month follow up )    Due for updated labs.  Since he was seen a few months ago, blood sugar has improved, although he occasionally goes above 200.  No hypoglycemia.  No adverse side effects from medications.      Review of Systems   Constitutional: Negative for fever.   Eyes: Negative for visual disturbance.   Respiratory: Negative for shortness of breath.    Cardiovascular: Negative for chest pain.   Endocrine: Negative for polydipsia and polyuria.   Skin: Negative for wound.       Objective:      Vitals:    11/14/18 1413   BP: 139/84   BP Location: Left arm   Patient Position: Sitting   BP Method: Medium (Automatic)   Pulse: 79   Resp: 16   Temp: 98.1 °F (36.7 °C)   TempSrc: Oral   SpO2: 96%   Weight: 77.1 kg (170 lb)     Physical Exam   Constitutional: He is oriented to person, place, and time. He appears well-developed and well-nourished.   HENT:   Head: Normocephalic and atraumatic.   Cardiovascular: Normal rate, regular rhythm and normal heart sounds.   Pulmonary/Chest: Effort normal and breath sounds normal.   Musculoskeletal: He exhibits no edema.   Bilateral BKA with right-sided prosthesis   Neurological: He is alert and oriented to person, place, and time.   Skin: Skin is warm and dry.   Psychiatric: He has a normal mood and affect. His behavior is normal.   Nursing note and vitals reviewed.      Assessment:       1. Type 2 diabetes mellitus with hyperlipidemia    2. Essential hypertension    3. Coronary artery disease, angina presence unspecified, unspecified vessel or lesion type, unspecified whether native or transplanted heart    4. PAD (peripheral artery disease)    5. Diabetic retinopathy screening    6. Need for vaccination        Plan:       Type 2 diabetes mellitus with hyperlipidemia  Continue current meds for now, check updated labs today, adjust meds accordingly  Essential  hypertension    Coronary artery disease, angina presence unspecified, unspecified vessel or lesion type, unspecified whether native or transplanted heart    PAD (peripheral artery disease)    Diabetic retinopathy screening  -     Ambulatory referral to Optometry    Need for vaccination  -     Td Vaccine (Adult) - Preservative Free  -     Influenza - Quadrivalent (3 years & older) (PF)         Medication List           Accurate as of 11/14/18  3:59 PM. If you have any questions, ask your nurse or doctor.               CHANGE how you take these medications    LANTUS SOLOSTAR U-100 INSULIN 100 unit/mL (3 mL) Inpn pen  Generic drug:  insulin glargine  INJECT 25 UNITS SUBCUTANEAOUSLY TWICE DAILY  What changed:  See the new instructions.        CONTINUE taking these medications    albuterol 90 mcg/actuation inhaler  Commonly known as:  VENTOLIN HFA  Inhale 2 puffs into the lungs every 4 (four) hours as needed for Wheezing or Shortness of Breath.     aspirin 81 MG EC tablet  Commonly known as:  ECOTRIN     atorvastatin 80 MG tablet  Commonly known as:  LIPITOR     baclofen 20 MG tablet  Commonly known as:  LIORESAL     blood sugar diagnostic Strp  Commonly known as:  TRUE METRIX GLUCOSE TEST STRIP  1 strip by Misc.(Non-Drug; Combo Route) route 2 (two) times daily before meals.     blood-glucose meter Misc  Commonly known as:  TRUE METRIX GLUCOSE METER  1 kit by Misc.(Non-Drug; Combo Route) route 2 (two) times daily.     carvedilol 3.125 MG tablet  Commonly known as:  COREG  Take 1 tablet (3.125 mg total) by mouth 2 (two) times daily.     clopidogrel 75 mg tablet  Commonly known as:  PLAVIX  TAKE ONE TABLET BY MOUTH ONCE DAILY     diazePAM 5 MG tablet  Commonly known as:  VALIUM     empagliflozin 25 mg Tab  Commonly known as:  JARDIANCE  Take 25 mg by mouth once daily.     folic acid 1 MG tablet  Commonly known as:  FOLVITE  TAKE ONE TABLET BY MOUTH DAILY     HYDROcodone-acetaminophen  mg per tablet  Commonly known  as:  NORCO     lancets 33 gauge Misc  Commonly known as:  TRUEPLUS LANCETS  1 lancet by Misc.(Non-Drug; Combo Route) route 2 (two) times daily before meals.     lisinopril 2.5 MG tablet  Commonly known as:  PRINIVIL,ZESTRIL  TAKE ONE TABLET BY MOUTH ONCE DAILY     metFORMIN 1000 MG tablet  Commonly known as:  GLUCOPHAGE  TAKE ONE TABLET BY MOUTH TWICE DAILY WITH MEALS     nitroGLYCERIN 0.4 MG SL tablet  Commonly known as:  NITROSTAT     NovoLOG Flexpen U-100 Insulin 100 unit/mL Inpn pen  Generic drug:  insulin aspart U-100     rosuvastatin 20 MG tablet  Commonly known as:  CRESTOR  TAKE ONE TABLET BY MOUTH DAILY FOR CHOLESTEROL. STOP SIMVASTATIN

## 2018-11-15 ENCOUNTER — TELEPHONE (OUTPATIENT)
Dept: PRIMARY CARE CLINIC | Facility: CLINIC | Age: 63
End: 2018-11-15

## 2018-11-15 NOTE — TELEPHONE ENCOUNTER
----- Message from Stephen Baires sent at 11/15/2018  4:34 PM CST -----  Contact: pt  Type:  Patient Returning Call    Who Called:  pt  Who Left Message for Patient:  unknown  Does the patient know what this is regarding?:  unknown  Best Call Back Number:  [phone]  Additional Information:  Call was not noted on the chart

## 2018-12-03 ENCOUNTER — TELEPHONE (OUTPATIENT)
Dept: PRIMARY CARE CLINIC | Facility: CLINIC | Age: 63
End: 2018-12-03

## 2018-12-03 NOTE — TELEPHONE ENCOUNTER
----- Message from RT Kamala sent at 12/3/2018  1:49 PM CST -----  Contact: Bebo,[phone] Chayo Lagunas,[phone] [employer], requesting a call back soon to confirm if your office received the fax sent to your office on Friday, 11/30/2018, thanks.

## 2018-12-07 ENCOUNTER — TELEPHONE (OUTPATIENT)
Dept: SURGERY | Facility: CLINIC | Age: 63
End: 2018-12-07

## 2018-12-07 NOTE — TELEPHONE ENCOUNTER
Instructions for colonoscopy did not arrive in the mail, pt will go to office [facility] and pick-up instructions from GHISLAINE Holland LPN, confirmed with Molly

## 2018-12-07 NOTE — TELEPHONE ENCOUNTER
----- Message from Serena Bryant sent at 12/7/2018 12:32 PM CST -----  Contact: Patient  Type: Needs Medical Advice    Who Called:  Jordan patient  Symptoms (please be specific):  N/A  How long has patient had these symptoms:  N/A  Pharmacy name and phone #:  N/A  Best Call Back Number: [phone]  Additional Information: Was told to call you to get the prep for colonoscopy scheduled on Thursday. Please call him. Thanks.

## 2018-12-12 ENCOUNTER — TELEPHONE (OUTPATIENT)
Dept: SURGERY | Facility: CLINIC | Age: 63
End: 2018-12-12

## 2018-12-12 ENCOUNTER — NURSE TRIAGE (OUTPATIENT)
Dept: ADMINISTRATIVE | Facility: CLINIC | Age: 63
End: 2018-12-12

## 2018-12-12 NOTE — TELEPHONE ENCOUNTER
Patient called to report the following:     -advice given on colonoscopy time and medications to hold prior to procedure   -verbalized understanding     Reason for Disposition   Question about upcoming scheduled test, no triage required and triager able to answer question    Protocols used: ST INFORMATION ONLY CALL-A-AH

## 2018-12-13 PROBLEM — Z12.11 SCREEN FOR COLON CANCER: Status: ACTIVE | Noted: 2018-12-13

## 2018-12-21 ENCOUNTER — TELEPHONE (OUTPATIENT)
Dept: PRIMARY CARE CLINIC | Facility: CLINIC | Age: 63
End: 2018-12-21

## 2018-12-21 ENCOUNTER — OFFICE VISIT (OUTPATIENT)
Dept: PRIMARY CARE CLINIC | Facility: CLINIC | Age: 63
End: 2018-12-21
Payer: MEDICARE

## 2018-12-21 VITALS
OXYGEN SATURATION: 98 % | RESPIRATION RATE: 16 BRPM | WEIGHT: 139 LBS | BODY MASS INDEX: 19.46 KG/M2 | TEMPERATURE: 98 F | DIASTOLIC BLOOD PRESSURE: 76 MMHG | HEIGHT: 71 IN | SYSTOLIC BLOOD PRESSURE: 110 MMHG | HEART RATE: 81 BPM

## 2018-12-21 DIAGNOSIS — Z74.09 IMPAIRED MOBILITY: Primary | ICD-10-CM

## 2018-12-21 DIAGNOSIS — J44.9 CHRONIC OBSTRUCTIVE PULMONARY DISEASE, UNSPECIFIED COPD TYPE: ICD-10-CM

## 2018-12-21 DIAGNOSIS — I25.10 CORONARY ARTERY DISEASE, ANGINA PRESENCE UNSPECIFIED, UNSPECIFIED VESSEL OR LESION TYPE, UNSPECIFIED WHETHER NATIVE OR TRANSPLANTED HEART: ICD-10-CM

## 2018-12-21 DIAGNOSIS — Z89.511 S/P BKA (BELOW KNEE AMPUTATION) BILATERAL: ICD-10-CM

## 2018-12-21 DIAGNOSIS — Z89.512 S/P BKA (BELOW KNEE AMPUTATION) BILATERAL: ICD-10-CM

## 2018-12-21 DIAGNOSIS — I50.9 CONGESTIVE HEART FAILURE, UNSPECIFIED HF CHRONICITY, UNSPECIFIED HEART FAILURE TYPE: ICD-10-CM

## 2018-12-21 DIAGNOSIS — I73.9 PAD (PERIPHERAL ARTERY DISEASE): ICD-10-CM

## 2018-12-21 PROCEDURE — 99214 OFFICE O/P EST MOD 30 MIN: CPT | Mod: S$GLB,,, | Performed by: FAMILY MEDICINE

## 2018-12-21 PROCEDURE — 3008F BODY MASS INDEX DOCD: CPT | Mod: CPTII,S$GLB,, | Performed by: FAMILY MEDICINE

## 2018-12-21 PROCEDURE — 3074F SYST BP LT 130 MM HG: CPT | Mod: CPTII,S$GLB,, | Performed by: FAMILY MEDICINE

## 2018-12-21 PROCEDURE — 99999 PR PBB SHADOW E&M-EST. PATIENT-LVL III: CPT | Mod: PBBFAC,,, | Performed by: FAMILY MEDICINE

## 2018-12-21 PROCEDURE — 3078F DIAST BP <80 MM HG: CPT | Mod: CPTII,S$GLB,, | Performed by: FAMILY MEDICINE

## 2018-12-21 NOTE — PROGRESS NOTES
"Subjective:       Patient ID: Jordan Mcpherson is a 63 y.o. male.    Chief Complaint: Follow-up (wants to discuss a power wheelchair)    Here for mobility evaluation. Currently on his third power chair, currently has a Ina, but has been unhappy with its durability or the service he was getting from [employer]. Also says current chair isn't really wide enough for him. Wants to get new chair from [employer].  He is s/p bilateral BKA 2+ years ago due to severe gangrene precipitated by severe PAD and diabetes mellitus. Had multiple lower extremity stents and bypass procedures prior to amputation. No recent falls or injury. Has never had any trouble operating power chairs, has made numerous accommodations to his home make it accessible for power chair. Also has manual wheelchair, but only able to go short distances due to chronic exertional dyspnea due to CAD, CHF and COPD. No recent falls or injury. Has bilateral prostheses, but typically only uses RLE prosthesis b/c hard to get on and left one gets in the way when he is driving.      Review of Systems   Constitutional: Negative for fever.   HENT: Negative for trouble swallowing.    Eyes: Negative for visual disturbance.   Respiratory: Positive for shortness of breath.    Cardiovascular: Negative for chest pain.   Gastrointestinal: Negative for nausea and vomiting.   Genitourinary: Negative for difficulty urinating.   Musculoskeletal: Positive for gait problem.   Skin: Negative for rash and wound.   Neurological: Negative for dizziness and light-headedness.   Hematological: Does not bruise/bleed easily.   Psychiatric/Behavioral: Negative for agitation and confusion.       Objective:      Vitals:    12/21/18 1142   BP: 110/76   BP Location: Left arm   Patient Position: Sitting   BP Method: Medium (Automatic)   Pulse: 81   Resp: 16   Temp: 98.1 °F (36.7 °C)   TempSrc: Oral   SpO2: 98%   Weight: 63 kg (139 lb)   Height: 5' 11" (1.803 m)     Physical Exam   Constitutional: He is " oriented to person, place, and time. He appears well-developed and well-nourished.   HENT:   Head: Normocephalic and atraumatic.   Cardiovascular: Normal rate, regular rhythm and normal heart sounds.   Pulmonary/Chest: Effort normal and breath sounds normal.   Musculoskeletal: He exhibits deformity. He exhibits no edema.   Bilateral below-the-knee amputations.  Right lower leg prosthesis in place.   Neurological: He is alert and oriented to person, place, and time. Coordination normal.   Skin: Skin is warm and dry.   Psychiatric: He has a normal mood and affect. His behavior is normal. Judgment normal.   Nursing note and vitals reviewed.      Assessment:       1. Impaired mobility    2. Chronic obstructive pulmonary disease, unspecified COPD type    3. PAD (peripheral artery disease)    4. Congestive heart failure, unspecified HF chronicity, unspecified heart failure type    5. Coronary artery disease, angina presence unspecified, unspecified vessel or lesion type, unspecified whether native or transplanted heart    6. S/P BKA (below knee amputation) bilateral        Plan:       Impaired mobility  Patient is willing and motivated to using power chair to facilitate mobility and ADLs.  Will contact [facility] to order new chair.  Chronic obstructive pulmonary disease, unspecified COPD type  Stable, patient unwilling to quit smoking  PAD (peripheral artery disease)  Stable  Congestive heart failure, unspecified HF chronicity, unspecified heart failure type  Stable, euvolemic  Coronary artery disease, angina presence unspecified, unspecified vessel or lesion type, unspecified whether native or transplanted heart  Stable, no angina  S/P BKA (below knee amputation) bilateral         Medication List           Accurate as of 12/21/18 12:07 PM. If you have any questions, ask your nurse or doctor.               CHANGE how you take these medications    LANTUS SOLOSTAR U-100 INSULIN glargine 100 units/mL (3mL) SubQ pen  Generic  drug:  insulin  INJECT 25 UNITS SUBCUTANEAOUSLY TWICE DAILY  What changed:  See the new instructions.        CONTINUE taking these medications    albuterol 90 mcg/actuation inhaler  Commonly known as:  VENTOLIN HFA  Inhale 2 puffs into the lungs every 4 (four) hours as needed for Wheezing or Shortness of Breath.     aspirin 81 MG EC tablet  Commonly known as:  ECOTRIN     atorvastatin 80 MG tablet  Commonly known as:  LIPITOR     baclofen 20 MG tablet  Commonly known as:  LIORESAL     blood sugar diagnostic Strp  Commonly known as:  TRUE METRIX GLUCOSE TEST STRIP  1 strip by Misc.(Non-Drug; Combo Route) route 2 (two) times daily before meals.     blood-glucose meter Misc  Commonly known as:  TRUE METRIX GLUCOSE METER  1 kit by Misc.(Non-Drug; Combo Route) route 2 (two) times daily.     carvedilol 3.125 MG tablet  Commonly known as:  COREG  Take 1 tablet (3.125 mg total) by mouth 2 (two) times daily.     clopidogrel 75 mg tablet  Commonly known as:  PLAVIX  TAKE ONE TABLET BY MOUTH ONCE DAILY     diazePAM 5 MG tablet  Commonly known as:  VALIUM     empagliflozin 25 mg Tab  Commonly known as:  JARDIANCE  Take 25 mg by mouth once daily.     folic acid 1 MG tablet  Commonly known as:  FOLVITE  TAKE ONE TABLET BY MOUTH DAILY     HYDROcodone-acetaminophen  mg per tablet  Commonly known as:  NORCO     lancets 33 gauge Misc  Commonly known as:  TRUEPLUS LANCETS  1 lancet by Misc.(Non-Drug; Combo Route) route 2 (two) times daily before meals.     lisinopril 2.5 MG tablet  Commonly known as:  PRINIVIL,ZESTRIL  TAKE ONE TABLET BY MOUTH ONCE DAILY     metFORMIN 1000 MG tablet  Commonly known as:  GLUCOPHAGE  TAKE ONE TABLET BY MOUTH TWICE DAILY WITH MEALS     nitroGLYCERIN 0.4 MG SL tablet  Commonly known as:  NITROSTAT     NovoLOG Flexpen U-100 Insulin 100 unit/mL Inpn pen  Generic drug:  insulin aspart U-100     rosuvastatin 20 MG tablet  Commonly known as:  CRESTOR  TAKE ONE TABLET BY MOUTH DAILY FOR CHOLESTEROL. STOP  SIMVASTATIN

## 2018-12-21 NOTE — TELEPHONE ENCOUNTER
----- Message from Mike Morley MD sent at 12/21/2018 12:01 PM CST -----  Please contact [employer] to get whatever paperwork we need to order a new power chair for patient.  SHANE

## 2018-12-26 ENCOUNTER — TELEPHONE (OUTPATIENT)
Dept: SURGERY | Facility: HOSPITAL | Age: 63
End: 2018-12-26

## 2019-01-02 DIAGNOSIS — I10 ESSENTIAL HYPERTENSION: ICD-10-CM

## 2019-01-02 RX ORDER — CARVEDILOL 3.12 MG/1
TABLET ORAL
Qty: 180 TABLET | Refills: 1 | Status: SHIPPED | OUTPATIENT
Start: 2019-01-02 | End: 2019-08-05 | Stop reason: SDUPTHER

## 2019-01-16 ENCOUNTER — TELEPHONE (OUTPATIENT)
Dept: PRIMARY CARE CLINIC | Facility: CLINIC | Age: 64
End: 2019-01-16

## 2019-01-16 NOTE — TELEPHONE ENCOUNTER
I spoke to Lisa but since stacie posada was used on the dx code, it needs to be re-done. She is faxing the 7 elements over again. I told her I will have it faxed back to her today.

## 2019-01-16 NOTE — TELEPHONE ENCOUNTER
----- Message from Serena Bryant sent at 1/16/2019  9:11 AM CST -----  Contact: Lisa with [employer] phone [phone] ref# [id number]  Lisa with [employer] phone [phone] ref# [id number], Calling to check the status of the order for a power chair that was faxed. Please call her. Thanks.

## 2019-01-29 ENCOUNTER — TELEPHONE (OUTPATIENT)
Dept: PRIMARY CARE CLINIC | Facility: CLINIC | Age: 64
End: 2019-01-29

## 2019-01-29 NOTE — TELEPHONE ENCOUNTER
----- Message from Shane Moffett sent at 1/29/2019  9:30 AM CST -----  Contact: Rachel Abbasi/Sandhya Munguiaa called in and stated she faxed over on 1/23/19 some additional information she needs to complete patients request for a new [employer] but had not heard back from office.  Order expires on 2/3/19.    Rachel's call back number is [phone], ref# [id number] & fax number is [phone]

## 2019-01-29 NOTE — TELEPHONE ENCOUNTER
I spoke to [employer] and they state they need more information added to the patients note for them to get it covered. I have placed the note from them on your desk.

## 2019-02-01 NOTE — PROGRESS NOTES
All of patient's ADLs in the home are impaired due to his condition.  Needs PMD to get to the bathroom to toilet and they have.  Needs PMD to get to the kitchen to prepare meals.  Needs PMD to get in and out of his bedroom to groom and dress himself.  Cane or walker is not able to meet his mobility needs because he has bilateral below-the-knee amputations and he only has a unilateral prosthesis.  POV does not meet mobility needs because patient's home environment does not provide adequate access for maneuvering.  Patient is mentally and physically capable of safely operating a power wheelchair.  Patient is able to perform a functional weight shift.  No major postural asymmetries.

## 2019-02-01 NOTE — TELEPHONE ENCOUNTER
Spoke to Willam with [employer] and she is going to leave a message for Lisa that I have faxed the addendum and to let me know if she does not get it.

## 2019-02-05 ENCOUNTER — TELEPHONE (OUTPATIENT)
Dept: PRIMARY CARE CLINIC | Facility: CLINIC | Age: 64
End: 2019-02-05

## 2019-02-05 NOTE — TELEPHONE ENCOUNTER
----- Message from Michelle Guthrie sent at 2/5/2019 10:27 AM CST -----  Contact: Swetha with [employer] ph#[phone]  Swetha with [employer] ph#[phone]  Checking the status of a delivery slip  Was it received for signature?

## 2019-02-11 RX ORDER — LISINOPRIL 2.5 MG/1
TABLET ORAL
Qty: 90 TABLET | Refills: 1 | Status: SHIPPED | OUTPATIENT
Start: 2019-02-11 | End: 2019-08-05 | Stop reason: SDUPTHER

## 2019-02-11 RX ORDER — CLOPIDOGREL BISULFATE 75 MG/1
TABLET ORAL
Qty: 90 TABLET | Refills: 1 | Status: SHIPPED | OUTPATIENT
Start: 2019-02-11 | End: 2019-08-05 | Stop reason: SDUPTHER

## 2019-02-15 ENCOUNTER — OFFICE VISIT (OUTPATIENT)
Dept: PRIMARY CARE CLINIC | Facility: CLINIC | Age: 64
End: 2019-02-15
Payer: MEDICARE

## 2019-02-15 ENCOUNTER — TELEPHONE (OUTPATIENT)
Dept: SURGERY | Facility: CLINIC | Age: 64
End: 2019-02-15

## 2019-02-15 DIAGNOSIS — J44.9 CHRONIC OBSTRUCTIVE PULMONARY DISEASE, UNSPECIFIED COPD TYPE: ICD-10-CM

## 2019-02-15 DIAGNOSIS — Z89.512 S/P BKA (BELOW KNEE AMPUTATION) BILATERAL: ICD-10-CM

## 2019-02-15 DIAGNOSIS — Z89.511 S/P BKA (BELOW KNEE AMPUTATION) BILATERAL: ICD-10-CM

## 2019-02-15 DIAGNOSIS — M13.0 POLYARTICULAR ARTHRITIS: Primary | ICD-10-CM

## 2019-02-15 PROCEDURE — 3074F PR MOST RECENT SYSTOLIC BLOOD PRESSURE < 130 MM HG: ICD-10-PCS | Mod: CPTII,S$GLB,, | Performed by: FAMILY MEDICINE

## 2019-02-15 PROCEDURE — 3078F DIAST BP <80 MM HG: CPT | Mod: CPTII,S$GLB,, | Performed by: FAMILY MEDICINE

## 2019-02-15 PROCEDURE — 99214 PR OFFICE/OUTPT VISIT, EST, LEVL IV, 30-39 MIN: ICD-10-PCS | Mod: S$GLB,,, | Performed by: FAMILY MEDICINE

## 2019-02-15 PROCEDURE — 3078F PR MOST RECENT DIASTOLIC BLOOD PRESSURE < 80 MM HG: ICD-10-PCS | Mod: CPTII,S$GLB,, | Performed by: FAMILY MEDICINE

## 2019-02-15 PROCEDURE — 3074F SYST BP LT 130 MM HG: CPT | Mod: CPTII,S$GLB,, | Performed by: FAMILY MEDICINE

## 2019-02-15 PROCEDURE — 99214 OFFICE O/P EST MOD 30 MIN: CPT | Mod: S$GLB,,, | Performed by: FAMILY MEDICINE

## 2019-02-15 PROCEDURE — 99999 PR PBB SHADOW E&M-EST. PATIENT-LVL III: ICD-10-PCS | Mod: PBBFAC,,, | Performed by: FAMILY MEDICINE

## 2019-02-15 PROCEDURE — 99999 PR PBB SHADOW E&M-EST. PATIENT-LVL III: CPT | Mod: PBBFAC,,, | Performed by: FAMILY MEDICINE

## 2019-02-15 RX ORDER — DICLOFENAC SODIUM 10 MG/G
2 GEL TOPICAL 4 TIMES DAILY
Qty: 100 G | Refills: 3 | Status: ON HOLD | OUTPATIENT
Start: 2019-02-15 | End: 2020-05-02

## 2019-02-15 NOTE — PROGRESS NOTES
"Subjective:       Patient ID: Jordan Mcpherson is a 63 y.o. male.    Chief Complaint: Follow-up (colonoscopy)    Here for follow-up on colonoscopy, found to have 2 benign polyps, a hyperplastic polyp and tubular adenoma.  Also complains of predominantly right sided arthritis pain, very stiff and sore mainly in the mornings.  Pain especially in right hand, elbow and shoulder.  No recent illness or injury.      Review of Systems   Constitutional: Negative for fever.   Respiratory: Negative for shortness of breath.    Cardiovascular: Negative for chest pain.   Gastrointestinal: Negative for diarrhea and vomiting.   Genitourinary: Negative for difficulty urinating.   Musculoskeletal: Positive for arthralgias and gait problem.   Skin: Negative for rash.   Psychiatric/Behavioral: Negative for agitation and confusion.       Objective:      Vitals:    02/15/19 1400   BP: (P) 138/74   BP Location: (P) Right arm   Patient Position: (P) Sitting   BP Method: (P) Medium (Automatic)   Pulse: (P) 72   Resp: (P) 18   Temp: (P) 98.2 °F (36.8 °C)   TempSrc: (P) Oral   SpO2: (P) 96%   Weight: (P) 63 kg (139 lb)   Height: (P) 5' 11" (1.803 m)     Physical Exam   Constitutional: He is oriented to person, place, and time. He appears well-developed and well-nourished.   HENT:   Head: Normocephalic and atraumatic.   Cardiovascular: Normal rate, regular rhythm and normal heart sounds.   Pulmonary/Chest: Effort normal and breath sounds normal.   Musculoskeletal: He exhibits deformity. He exhibits no edema.   Bilateral below-the-knee amputations.  Right lower leg prosthesis in place.   Neurological: He is alert and oriented to person, place, and time. Coordination normal.   Skin: Skin is warm and dry.   Psychiatric: He has a normal mood and affect. His behavior is normal. Judgment normal.   Nursing note and vitals reviewed.      Assessment:       1. Polyarticular arthritis    2. Chronic obstructive pulmonary disease, unspecified COPD type    3. " S/P BKA (below knee amputation) bilateral        Plan:       Polyarticular arthritis  -     diclofenac sodium (VOLTAREN) 1 % Gel; Apply 2 g topically 4 (four) times daily.  Dispense: 100 g; Refill: 3  Already gets pain meds from pain management  Chronic obstructive pulmonary disease, unspecified COPD type  Stable  S/P BKA (below knee amputation) bilateral  Stable    Medication List with Changes/Refills   New Medications    DICLOFENAC SODIUM (VOLTAREN) 1 % GEL    Apply 2 g topically 4 (four) times daily.   Current Medications    ALBUTEROL (VENTOLIN HFA) 90 MCG/ACTUATION INHALER    Inhale 2 puffs into the lungs every 4 (four) hours as needed for Wheezing or Shortness of Breath.    ASPIRIN (ECOTRIN) 81 MG EC TABLET    Take 81 mg by mouth once daily.    ATORVASTATIN (LIPITOR) 80 MG TABLET    Take 1 tablet by mouth once daily.    BACLOFEN (LIORESAL) 20 MG TABLET    Take 20 mg by mouth 4 (four) times daily.     BLOOD SUGAR DIAGNOSTIC (TRUE METRIX GLUCOSE TEST STRIP) STRP    1 strip by Misc.(Non-Drug; Combo Route) route 2 (two) times daily before meals.    BLOOD-GLUCOSE METER (TRUE METRIX GLUCOSE METER) MISC    1 kit by Misc.(Non-Drug; Combo Route) route 2 (two) times daily.    CARVEDILOL (COREG) 3.125 MG TABLET    TAKE ONE TABLET BY MOUTH TWICE DAILY    CLOPIDOGREL (PLAVIX) 75 MG TABLET    TAKE ONE TABLET BY MOUTH ONCE DAILY    DIAZEPAM (VALIUM) 5 MG TABLET    Take 1 tablet by mouth once daily.    EMPAGLIFLOZIN (JARDIANCE) 25 MG TAB    Take 25 mg by mouth once daily.    FOLIC ACID (FOLVITE) 1 MG TABLET    TAKE ONE TABLET BY MOUTH DAILY    HYDROCODONE-ACETAMINOPHEN 10-325MG (NORCO)  MG TAB    Take 1 tablet by mouth once daily.    INSULIN ASPART (NOVOLOG FLEXPEN) 100 UNIT/ML INPN PEN    Inject 2 Units into the skin 2 (two) times daily.    LANCETS (TRUEPLUS LANCETS) 33 GAUGE MISC    1 lancet by Misc.(Non-Drug; Combo Route) route 2 (two) times daily before meals.    LANTUS SOLOSTAR U-100 INSULIN 100 UNIT/ML (3 ML) INPN  PEN    INJECT 25 UNITS SUBCUTANEAOUSLY TWICE DAILY    LISINOPRIL (PRINIVIL,ZESTRIL) 2.5 MG TABLET    TAKE ONE TABLET BY MOUTH ONCE DAILY    METFORMIN (GLUCOPHAGE) 1000 MG TABLET    TAKE ONE TABLET BY MOUTH TWICE DAILY WITH MEALS    NITROGLYCERIN (NITROSTAT) 0.4 MG SL TABLET    Place 1 tablet under the tongue as needed.    ROSUVASTATIN (CRESTOR) 20 MG TABLET    TAKE ONE TABLET BY MOUTH DAILY FOR CHOLESTEROL. STOP SIMVASTATIN

## 2019-02-15 NOTE — TELEPHONE ENCOUNTER
----- Message from Mike Morley MD sent at 2/15/2019  3:04 PM CST -----  Sounds good, thanks  ----- Message -----  From: Pedro Alves LPN  Sent: 2/15/2019   2:50 PM  To: Mike Morley MD    Hello Dr Morley,  I have him in the system for repeat at 7 years sooner if he has c/o changes in bowel, or has  bleeding .  Pedro  ----- Message -----  From: Mike Morley MD  Sent: 2/15/2019   2:14 PM  To: Pedro Alves LPN, Tomás Grimm MD    Tubular adenoma and hyperplastic polyp on colonoscopy.  When do you recommend repeating a colonoscopy?  Thanks.  Mike

## 2019-04-08 DIAGNOSIS — E11.69 TYPE 2 DIABETES MELLITUS WITH HYPERLIPIDEMIA: ICD-10-CM

## 2019-04-08 DIAGNOSIS — E78.5 TYPE 2 DIABETES MELLITUS WITH HYPERLIPIDEMIA: ICD-10-CM

## 2019-04-08 RX ORDER — EMPAGLIFLOZIN 25 MG/1
TABLET, FILM COATED ORAL
Qty: 30 TABLET | Refills: 5 | Status: SHIPPED | OUTPATIENT
Start: 2019-04-08 | End: 2020-09-24

## 2019-04-08 RX ORDER — METFORMIN HYDROCHLORIDE 1000 MG/1
TABLET ORAL
Qty: 60 TABLET | Refills: 5 | Status: SHIPPED | OUTPATIENT
Start: 2019-04-08 | End: 2019-11-22 | Stop reason: SDUPTHER

## 2019-04-18 ENCOUNTER — OFFICE VISIT (OUTPATIENT)
Dept: PRIMARY CARE CLINIC | Facility: CLINIC | Age: 64
End: 2019-04-18
Payer: MEDICARE

## 2019-04-18 ENCOUNTER — TELEPHONE (OUTPATIENT)
Dept: PRIMARY CARE CLINIC | Facility: CLINIC | Age: 64
End: 2019-04-18

## 2019-04-18 VITALS
DIASTOLIC BLOOD PRESSURE: 70 MMHG | TEMPERATURE: 98 F | WEIGHT: 157 LBS | BODY MASS INDEX: 21.98 KG/M2 | SYSTOLIC BLOOD PRESSURE: 164 MMHG | RESPIRATION RATE: 16 BRPM | HEIGHT: 71 IN | OXYGEN SATURATION: 97 % | HEART RATE: 73 BPM

## 2019-04-18 DIAGNOSIS — Z12.5 PROSTATE CANCER SCREENING: ICD-10-CM

## 2019-04-18 DIAGNOSIS — J44.1 COPD EXACERBATION: Primary | ICD-10-CM

## 2019-04-18 DIAGNOSIS — E78.5 HYPERLIPIDEMIA, UNSPECIFIED HYPERLIPIDEMIA TYPE: ICD-10-CM

## 2019-04-18 DIAGNOSIS — E53.8 B12 DEFICIENCY: ICD-10-CM

## 2019-04-18 DIAGNOSIS — E11.69 TYPE 2 DIABETES MELLITUS WITH HYPERLIPIDEMIA: ICD-10-CM

## 2019-04-18 DIAGNOSIS — I73.9 PAD (PERIPHERAL ARTERY DISEASE): ICD-10-CM

## 2019-04-18 DIAGNOSIS — E78.5 TYPE 2 DIABETES MELLITUS WITH HYPERLIPIDEMIA: ICD-10-CM

## 2019-04-18 PROCEDURE — 99214 PR OFFICE/OUTPT VISIT, EST, LEVL IV, 30-39 MIN: ICD-10-PCS | Mod: S$GLB,,, | Performed by: FAMILY MEDICINE

## 2019-04-18 PROCEDURE — 99999 PR PBB SHADOW E&M-EST. PATIENT-LVL III: CPT | Mod: PBBFAC,,, | Performed by: FAMILY MEDICINE

## 2019-04-18 PROCEDURE — 3008F PR BODY MASS INDEX (BMI) DOCUMENTED: ICD-10-PCS | Mod: CPTII,S$GLB,, | Performed by: FAMILY MEDICINE

## 2019-04-18 PROCEDURE — 99214 OFFICE O/P EST MOD 30 MIN: CPT | Mod: S$GLB,,, | Performed by: FAMILY MEDICINE

## 2019-04-18 PROCEDURE — 3008F BODY MASS INDEX DOCD: CPT | Mod: CPTII,S$GLB,, | Performed by: FAMILY MEDICINE

## 2019-04-18 PROCEDURE — 3044F PR MOST RECENT HEMOGLOBIN A1C LEVEL <7.0%: ICD-10-PCS | Mod: CPTII,S$GLB,, | Performed by: FAMILY MEDICINE

## 2019-04-18 PROCEDURE — 3077F PR MOST RECENT SYSTOLIC BLOOD PRESSURE >= 140 MM HG: ICD-10-PCS | Mod: CPTII,S$GLB,, | Performed by: FAMILY MEDICINE

## 2019-04-18 PROCEDURE — 99999 PR PBB SHADOW E&M-EST. PATIENT-LVL III: ICD-10-PCS | Mod: PBBFAC,,, | Performed by: FAMILY MEDICINE

## 2019-04-18 PROCEDURE — 3078F PR MOST RECENT DIASTOLIC BLOOD PRESSURE < 80 MM HG: ICD-10-PCS | Mod: CPTII,S$GLB,, | Performed by: FAMILY MEDICINE

## 2019-04-18 PROCEDURE — 3044F HG A1C LEVEL LT 7.0%: CPT | Mod: CPTII,S$GLB,, | Performed by: FAMILY MEDICINE

## 2019-04-18 PROCEDURE — 3078F DIAST BP <80 MM HG: CPT | Mod: CPTII,S$GLB,, | Performed by: FAMILY MEDICINE

## 2019-04-18 PROCEDURE — 3077F SYST BP >= 140 MM HG: CPT | Mod: CPTII,S$GLB,, | Performed by: FAMILY MEDICINE

## 2019-04-18 RX ORDER — DOXYCYCLINE 100 MG/1
100 CAPSULE ORAL EVERY 12 HOURS
Qty: 20 CAPSULE | Refills: 0 | Status: SHIPPED | OUTPATIENT
Start: 2019-04-18 | End: 2019-04-28

## 2019-04-18 RX ORDER — PREDNISONE 20 MG/1
TABLET ORAL
Qty: 10 TABLET | Refills: 0 | Status: SHIPPED | OUTPATIENT
Start: 2019-04-18 | End: 2019-04-25

## 2019-04-18 NOTE — PROGRESS NOTES
"Subjective:       Patient ID: Jordan Mcpherson is a 63 y.o. male.    Chief Complaint: Cough (Coughing up white phlegm x3 weeks )    Cough   The current episode started 1 to 4 weeks ago (3 weeks). The problem has been unchanged. The problem occurs every few minutes. The cough is productive of sputum. Associated symptoms include wheezing. Pertinent negatives include no chest pain, chills, fever, hemoptysis or shortness of breath. The symptoms are aggravated by lying down. Risk factors for lung disease include smoking/tobacco exposure. He has tried nothing for the symptoms. His past medical history is significant for COPD.     Review of Systems   Constitutional: Negative for chills and fever.   HENT: Negative for trouble swallowing.    Eyes: Negative for visual disturbance.   Respiratory: Positive for cough and wheezing. Negative for hemoptysis and shortness of breath.    Cardiovascular: Negative for chest pain.   Gastrointestinal: Negative for nausea and vomiting.       Objective:      Vitals:    04/18/19 1225   BP: (!) 164/70   BP Location: Left arm   Patient Position: Sitting   BP Method: Medium (Automatic)   Pulse: 73   Resp: 16   Temp: 97.7 °F (36.5 °C)   TempSrc: Oral   SpO2: 97%   Weight: 71.2 kg (157 lb)   Height: 5' 11" (1.803 m)     Physical Exam   Constitutional: He is oriented to person, place, and time. He appears well-developed and well-nourished.   HENT:   Head: Normocephalic and atraumatic.   Neck: No JVD present.   Cardiovascular: Normal rate, regular rhythm and normal heart sounds.   Pulmonary/Chest: Effort normal. No respiratory distress. He has wheezes in the right lower field. He has rhonchi in the right lower field. He has no rales.   Neurological: He is alert and oriented to person, place, and time.   Skin: Skin is warm and dry.   Psychiatric: He has a normal mood and affect. His behavior is normal.   Nursing note and vitals reviewed.      Assessment:       1. COPD exacerbation    2. Type 2 diabetes " mellitus with hyperlipidemia    3. Hyperlipidemia, unspecified hyperlipidemia type    4. PAD (peripheral artery disease)    5. B12 deficiency    6. Prostate cancer screening        Plan:       COPD exacerbation  -     X-Ray Chest PA And Lateral; Future; Expected date: 04/18/2019  -     doxycycline (VIBRAMYCIN) 100 MG Cap; Take 1 capsule (100 mg total) by mouth every 12 (twelve) hours. for 10 days  Dispense: 20 capsule; Refill: 0  -     predniSONE (DELTASONE) 20 MG tablet; Take 2 tablets (40 mg total) by mouth once daily for 3 days, THEN 1 tablet (20 mg total) once daily for 4 days.  Dispense: 10 tablet; Refill: 0  No acute abnormalities on chest x-ray.  Treat with short course of antibiotics, steroids and bronchodilators.  To ER for any new or worsening symptoms.  Type 2 diabetes mellitus with hyperlipidemia  -     Comprehensive metabolic panel; Future; Expected date: 07/18/2019  -     Hemoglobin A1c; Future; Expected date: 07/18/2019  -     Microalbumin/creatinine urine ratio; Future; Expected date: 07/18/2019    Hyperlipidemia, unspecified hyperlipidemia type  -     Comprehensive metabolic panel; Future; Expected date: 07/18/2019  -     Lipid panel; Future; Expected date: 07/18/2019    PAD (peripheral artery disease)  -     CBC auto differential; Future; Expected date: 07/18/2019  -     Comprehensive metabolic panel; Future; Expected date: 07/18/2019    B12 deficiency  -     CBC auto differential; Future; Expected date: 07/18/2019  -     Vitamin B12; Future; Expected date: 07/18/2019    Prostate cancer screening  -     PSA, Screening; Future; Expected date: 07/18/2019      Medication List with Changes/Refills   New Medications    DOXYCYCLINE (VIBRAMYCIN) 100 MG CAP    Take 1 capsule (100 mg total) by mouth every 12 (twelve) hours. for 10 days    PREDNISONE (DELTASONE) 20 MG TABLET    Take 2 tablets (40 mg total) by mouth once daily for 3 days, THEN 1 tablet (20 mg total) once daily for 4 days.   Current  Medications    ALBUTEROL (VENTOLIN HFA) 90 MCG/ACTUATION INHALER    Inhale 2 puffs into the lungs every 4 (four) hours as needed for Wheezing or Shortness of Breath.    ASPIRIN (ECOTRIN) 81 MG EC TABLET    Take 81 mg by mouth once daily.    ATORVASTATIN (LIPITOR) 80 MG TABLET    Take 1 tablet by mouth once daily.    BACLOFEN (LIORESAL) 20 MG TABLET    Take 20 mg by mouth 4 (four) times daily.     BLOOD SUGAR DIAGNOSTIC (TRUE METRIX GLUCOSE TEST STRIP) STRP    1 strip by Misc.(Non-Drug; Combo Route) route 2 (two) times daily before meals.    BLOOD-GLUCOSE METER (TRUE METRIX GLUCOSE METER) MISC    1 kit by Misc.(Non-Drug; Combo Route) route 2 (two) times daily.    CARVEDILOL (COREG) 3.125 MG TABLET    TAKE ONE TABLET BY MOUTH TWICE DAILY    CLOPIDOGREL (PLAVIX) 75 MG TABLET    TAKE ONE TABLET BY MOUTH ONCE DAILY    DIAZEPAM (VALIUM) 5 MG TABLET    Take 1 tablet by mouth once daily.    DICLOFENAC SODIUM (VOLTAREN) 1 % GEL    Apply 2 g topically 4 (four) times daily.    FOLIC ACID (FOLVITE) 1 MG TABLET    TAKE ONE TABLET BY MOUTH DAILY    HYDROCODONE-ACETAMINOPHEN 10-325MG (NORCO)  MG TAB    Take 1 tablet by mouth once daily.    INSULIN ASPART (NOVOLOG FLEXPEN) 100 UNIT/ML INPN PEN    Inject 2 Units into the skin 2 (two) times daily.    JARDIANCE 25 MG TAB    TAKE ONE TABLET BY MOUTH ONCE DAILY    LANCETS (TRUEPLUS LANCETS) 33 GAUGE MISC    1 lancet by Misc.(Non-Drug; Combo Route) route 2 (two) times daily before meals.    LANTUS SOLOSTAR U-100 INSULIN 100 UNIT/ML (3 ML) INPN PEN    INJECT 25 UNITS SUBCUTANEAOUSLY TWICE DAILY    LISINOPRIL (PRINIVIL,ZESTRIL) 2.5 MG TABLET    TAKE ONE TABLET BY MOUTH ONCE DAILY    METFORMIN (GLUCOPHAGE) 1000 MG TABLET    TAKE ONE TABLET BY MOUTH TWICE DAILY WITH MEALS    NITROGLYCERIN (NITROSTAT) 0.4 MG SL TABLET    Place 1 tablet under the tongue as needed.    ROSUVASTATIN (CRESTOR) 20 MG TABLET    TAKE ONE TABLET BY MOUTH DAILY FOR CHOLESTEROL. STOP SIMVASTATIN

## 2019-04-18 NOTE — TELEPHONE ENCOUNTER
----- Message from Mike Morley MD sent at 4/18/2019 12:45 PM CDT -----  [facility] type [facility] need to repair patient's power chair.

## 2019-04-18 NOTE — TELEPHONE ENCOUNTER
Carlos states they just need a signed rx stating the chair needs to be evaluated for repairs. A  will go out to the home and evaluate the chair for repairs and an order will be sent to [facility].

## 2019-04-18 NOTE — TELEPHONE ENCOUNTER
Order needs to be faxed to [facility] for repairs to be made to his power chair (tires, seats, and battery).

## 2019-05-01 ENCOUNTER — PATIENT OUTREACH (OUTPATIENT)
Dept: ADMINISTRATIVE | Facility: HOSPITAL | Age: 64
End: 2019-05-01

## 2019-05-01 NOTE — PROGRESS NOTES
Immunizations reviewed. Legacy reviewed. Patient previously referred to Dr. Senior for routine eye exam. Placed call to patient. Patient states he has to get paid before he makes an appointment. Patient was educated on routine eye exam importance and verbalized understanding. Pre-visit chart review completed.

## 2019-05-07 LAB
LEFT EYE DM RETINOPATHY: NORMAL
RIGHT EYE DM RETINOPATHY: NORMAL

## 2019-05-17 RX ORDER — ROSUVASTATIN CALCIUM 20 MG/1
TABLET, COATED ORAL
Qty: 90 TABLET | Refills: 1 | Status: SHIPPED | OUTPATIENT
Start: 2019-05-17 | End: 2019-11-22 | Stop reason: SDUPTHER

## 2019-05-17 RX ORDER — FOLIC ACID 1 MG/1
TABLET ORAL
Qty: 30 TABLET | Refills: 5 | Status: SHIPPED | OUTPATIENT
Start: 2019-05-17 | End: 2020-03-04

## 2019-05-24 ENCOUNTER — TELEPHONE (OUTPATIENT)
Dept: ADMINISTRATIVE | Facility: HOSPITAL | Age: 64
End: 2019-05-24

## 2019-08-05 DIAGNOSIS — I10 ESSENTIAL HYPERTENSION: ICD-10-CM

## 2019-08-05 RX ORDER — LISINOPRIL 2.5 MG/1
TABLET ORAL
Qty: 90 TABLET | Refills: 1 | Status: SHIPPED | OUTPATIENT
Start: 2019-08-05 | End: 2020-03-04

## 2019-08-05 RX ORDER — CARVEDILOL 3.12 MG/1
TABLET ORAL
Qty: 180 TABLET | Refills: 1 | Status: SHIPPED | OUTPATIENT
Start: 2019-08-05 | End: 2020-05-01

## 2019-08-05 RX ORDER — CLOPIDOGREL BISULFATE 75 MG/1
TABLET ORAL
Qty: 90 TABLET | Refills: 1 | Status: SHIPPED | OUTPATIENT
Start: 2019-08-05 | End: 2020-03-04

## 2019-11-04 ENCOUNTER — CLINICAL SUPPORT (OUTPATIENT)
Dept: PRIMARY CARE CLINIC | Facility: CLINIC | Age: 64
End: 2019-11-04
Payer: MEDICARE

## 2019-11-04 ENCOUNTER — OFFICE VISIT (OUTPATIENT)
Dept: PRIMARY CARE CLINIC | Facility: CLINIC | Age: 64
End: 2019-11-04
Payer: MEDICARE

## 2019-11-04 VITALS
RESPIRATION RATE: 16 BRPM | DIASTOLIC BLOOD PRESSURE: 82 MMHG | HEIGHT: 71 IN | BODY MASS INDEX: 21.7 KG/M2 | WEIGHT: 155 LBS | TEMPERATURE: 98 F | HEART RATE: 82 BPM | OXYGEN SATURATION: 95 % | SYSTOLIC BLOOD PRESSURE: 118 MMHG

## 2019-11-04 DIAGNOSIS — E11.69 TYPE 2 DIABETES MELLITUS WITH HYPERLIPIDEMIA: ICD-10-CM

## 2019-11-04 DIAGNOSIS — E78.5 TYPE 2 DIABETES MELLITUS WITH HYPERLIPIDEMIA: ICD-10-CM

## 2019-11-04 DIAGNOSIS — I50.9 CONGESTIVE HEART FAILURE, UNSPECIFIED HF CHRONICITY, UNSPECIFIED HEART FAILURE TYPE: ICD-10-CM

## 2019-11-04 DIAGNOSIS — E78.5 HYPERLIPIDEMIA, UNSPECIFIED HYPERLIPIDEMIA TYPE: ICD-10-CM

## 2019-11-04 DIAGNOSIS — F41.9 ANXIETY: Primary | ICD-10-CM

## 2019-11-04 DIAGNOSIS — Z89.512 S/P BKA (BELOW KNEE AMPUTATION) BILATERAL: ICD-10-CM

## 2019-11-04 DIAGNOSIS — Z23 NEED FOR VACCINATION: ICD-10-CM

## 2019-11-04 DIAGNOSIS — Z12.5 PROSTATE CANCER SCREENING: ICD-10-CM

## 2019-11-04 DIAGNOSIS — Z89.511 S/P BKA (BELOW KNEE AMPUTATION) BILATERAL: ICD-10-CM

## 2019-11-04 DIAGNOSIS — F33.1 MODERATE EPISODE OF RECURRENT MAJOR DEPRESSIVE DISORDER: ICD-10-CM

## 2019-11-04 DIAGNOSIS — I73.9 PAD (PERIPHERAL ARTERY DISEASE): ICD-10-CM

## 2019-11-04 DIAGNOSIS — E53.8 B12 DEFICIENCY: ICD-10-CM

## 2019-11-04 LAB
ALBUMIN SERPL BCP-MCNC: 4.2 G/DL (ref 3.5–5.2)
ALP SERPL-CCNC: 71 U/L (ref 38–126)
ALT SERPL W/O P-5'-P-CCNC: 13 U/L (ref 17–63)
ANION GAP SERPL CALC-SCNC: 12 MMOL/L (ref 8–16)
AST SERPL-CCNC: 14 U/L (ref 15–41)
BASOPHILS # BLD AUTO: 0.1 K/UL (ref 0–0.2)
BASOPHILS NFR BLD: 0.8 % (ref 0–1.9)
BILIRUB SERPL-MCNC: 0.5 MG/DL (ref 0.3–1.2)
BUN SERPL-MCNC: 24 MG/DL (ref 8–23)
CALCIUM SERPL-MCNC: 9.1 MG/DL (ref 8.6–10)
CHLORIDE SERPL-SCNC: 100 MMOL/L (ref 101–111)
CHOLEST SERPL-MCNC: 80 MG/DL (ref 80–200)
CHOLEST/HDLC SERPL: 2.4 {RATIO} (ref 2–5)
CO2 SERPL-SCNC: 25 MMOL/L (ref 23–29)
COMPLEXED PSA SERPL-MCNC: 0.67 NG/ML (ref 0–4)
CREAT SERPL-MCNC: 0.9 MG/DL (ref 0.5–1.4)
DIFFERENTIAL METHOD: NORMAL
EOSINOPHIL # BLD AUTO: 0.2 K/UL (ref 0–0.5)
EOSINOPHIL NFR BLD: 1.7 % (ref 0–8)
ERYTHROCYTE [DISTWIDTH] IN BLOOD BY AUTOMATED COUNT: 14.1 % (ref 11.5–14.5)
EST. GFR  (AFRICAN AMERICAN): >60 ML/MIN/1.73 M^2
EST. GFR  (NON AFRICAN AMERICAN): >60 ML/MIN/1.73 M^2
ESTIMATED AVG GLUCOSE: 203 MG/DL (ref 68–131)
GLUCOSE SERPL-MCNC: 311 MG/DL (ref 70–110)
GLUCOSE SERPL-MCNC: 337 MG/DL (ref 74–118)
HBA1C MFR BLD HPLC: 8.7 % (ref 4–5.6)
HCT VFR BLD AUTO: 44.1 % (ref 40–54)
HDLC SERPL-MCNC: 34 MG/DL (ref 40–75)
HDLC SERPL: 42.5 % (ref 20–50)
HGB BLD-MCNC: 14.6 G/DL (ref 14–18)
LDLC SERPL CALC-MCNC: 27 MG/DL
LYMPHOCYTES # BLD AUTO: 2.6 K/UL (ref 1–4.8)
LYMPHOCYTES NFR BLD: 25.8 % (ref 18–48)
MCH RBC QN AUTO: 29.4 PG (ref 27–31)
MCHC RBC AUTO-ENTMCNC: 33.1 G/DL (ref 32–36)
MCV RBC AUTO: 89 FL (ref 82–98)
MONOCYTES # BLD AUTO: 0.8 K/UL (ref 0.3–1)
MONOCYTES NFR BLD: 8.3 % (ref 4–15)
NEUTROPHILS # BLD AUTO: 6.3 K/UL (ref 1.8–7.7)
NEUTROPHILS NFR BLD: 63.4 % (ref 38–73)
NONHDLC SERPL-MCNC: 46 MG/DL
PLATELET # BLD AUTO: 274 K/UL (ref 150–350)
PMV BLD AUTO: 10.8 FL (ref 9.2–12.9)
POTASSIUM SERPL-SCNC: 4.2 MMOL/L (ref 3.5–5.1)
PROT SERPL-MCNC: 7.5 G/DL (ref 6–8.4)
RBC # BLD AUTO: 4.97 M/UL (ref 4.6–6.2)
SODIUM SERPL-SCNC: 137 MMOL/L (ref 136–145)
TRIGL SERPL-MCNC: 96 MG/DL (ref 30–150)
VIT B12 SERPL-MCNC: 137 PG/ML (ref 180–914)
WBC # BLD AUTO: 9.9 K/UL (ref 3.9–12.7)

## 2019-11-04 PROCEDURE — 83036 HEMOGLOBIN GLYCOSYLATED A1C: CPT

## 2019-11-04 PROCEDURE — 80061 LIPID PANEL: CPT

## 2019-11-04 PROCEDURE — 84153 ASSAY OF PSA TOTAL: CPT

## 2019-11-04 PROCEDURE — 36415 COLL VENOUS BLD VENIPUNCTURE: CPT | Mod: PBBFAC,PN

## 2019-11-04 PROCEDURE — 99999 PR PBB SHADOW E&M-EST. PATIENT-LVL III: ICD-10-PCS | Mod: PBBFAC,,, | Performed by: FAMILY MEDICINE

## 2019-11-04 PROCEDURE — 99214 PR OFFICE/OUTPT VISIT, EST, LEVL IV, 30-39 MIN: ICD-10-PCS | Mod: S$PBB,,, | Performed by: FAMILY MEDICINE

## 2019-11-04 PROCEDURE — 99214 OFFICE O/P EST MOD 30 MIN: CPT | Mod: S$PBB,,, | Performed by: FAMILY MEDICINE

## 2019-11-04 PROCEDURE — 90686 IIV4 VACC NO PRSV 0.5 ML IM: CPT | Mod: PBBFAC,PN

## 2019-11-04 PROCEDURE — 99213 OFFICE O/P EST LOW 20 MIN: CPT | Mod: PBBFAC,PN,25 | Performed by: FAMILY MEDICINE

## 2019-11-04 PROCEDURE — 82607 VITAMIN B-12: CPT

## 2019-11-04 PROCEDURE — 99999 PR PBB SHADOW E&M-EST. PATIENT-LVL III: CPT | Mod: PBBFAC,,, | Performed by: FAMILY MEDICINE

## 2019-11-04 PROCEDURE — 85025 COMPLETE CBC W/AUTO DIFF WBC: CPT

## 2019-11-04 PROCEDURE — 82962 GLUCOSE BLOOD TEST: CPT | Mod: PBBFAC,PN | Performed by: FAMILY MEDICINE

## 2019-11-04 PROCEDURE — 80053 COMPREHEN METABOLIC PANEL: CPT

## 2019-11-04 RX ORDER — DULOXETIN HYDROCHLORIDE 30 MG/1
30 CAPSULE, DELAYED RELEASE ORAL DAILY
Qty: 30 CAPSULE | Refills: 1 | Status: SHIPPED | OUTPATIENT
Start: 2019-11-04 | End: 2020-01-14

## 2019-11-04 NOTE — PROGRESS NOTES
"Subjective:       Patient ID: Jordan Mcpherson is a 64 y.o. male.    Chief Complaint: Panic Attack    Complains of 'bad anxiety' and depression since he was taken off Valium over a year ago. Still taking 4 Norco 10mg daily, goes to pain management. Admits to passing thoughts of self-harm, but no plans or intent. No CP or SoB.  Says he got an "error" message the last few times he tried to check his sugar at home    Review of Systems   Constitutional: Negative for chills and fever.   HENT: Negative for trouble swallowing.    Eyes: Negative for visual disturbance.   Respiratory: Negative for shortness of breath.    Cardiovascular: Negative for chest pain.   Gastrointestinal: Negative for blood in stool.   Genitourinary: Negative for difficulty urinating.   Musculoskeletal: Positive for back pain and gait problem.   Neurological: Positive for weakness.   Psychiatric/Behavioral: Positive for dysphoric mood, sleep disturbance and suicidal ideas. Negative for self-injury. The patient is nervous/anxious.        Objective:      Vitals:    11/04/19 1006   BP: 118/82   BP Location: Right arm   Patient Position: Sitting   BP Method: Medium (Manual)   Pulse: 82   Resp: 16   Temp: 97.9 °F (36.6 °C)   TempSrc: Oral   SpO2: 95%   Weight: 70.3 kg (155 lb)   Height: 5' 11" (1.803 m)     Physical Exam   Constitutional: He is oriented to person, place, and time. He appears well-developed and well-nourished.   HENT:   Head: Normocephalic and atraumatic.   Cardiovascular: Normal rate, regular rhythm and normal heart sounds.   Pulmonary/Chest: Effort normal and breath sounds normal.   Musculoskeletal: He exhibits deformity. He exhibits no edema.   Bilateral below-the-knee amputations.  Bilateral lower leg prostheses in place.   Neurological: He is alert and oriented to person, place, and time. Coordination normal.   Skin: Skin is warm and dry.   Psychiatric: He has a normal mood and affect. His behavior is normal. Judgment normal.   Nursing " note and vitals reviewed.      Assessment:       1. Anxiety    2. Need for vaccination    3. Moderate episode of recurrent major depressive disorder    4. S/P BKA (below knee amputation) bilateral    5. Congestive heart failure, unspecified HF chronicity, unspecified heart failure type    6. Type 2 diabetes mellitus with hyperlipidemia        Plan:       Anxiety  -     DULoxetine (CYMBALTA) 30 MG capsule; Take 1 capsule (30 mg total) by mouth once daily.  Dispense: 30 capsule; Refill: 1  Explained rationale for avoiding use of BZD's in combination with opiates due to potential safety issues  Need for vaccination  -     Influenza - Quadrivalent (PF)    Moderate episode of recurrent major depressive disorder  -     DULoxetine (CYMBALTA) 30 MG capsule; Take 1 capsule (30 mg total) by mouth once daily.  Dispense: 30 capsule; Refill: 1    S/P BKA (below knee amputation) bilateral  stable  Congestive heart failure, unspecified HF chronicity, unspecified heart failure type  Appears euvolemic  Type 2 diabetes mellitus with hyperlipidemia  -     POCT Glucose, Hand-Held Device  Updated labs today, consult with CDE    Medication List with Changes/Refills   New Medications    DULOXETINE (CYMBALTA) 30 MG CAPSULE    Take 1 capsule (30 mg total) by mouth once daily.   Current Medications    ALBUTEROL (VENTOLIN HFA) 90 MCG/ACTUATION INHALER    Inhale 2 puffs into the lungs every 4 (four) hours as needed for Wheezing or Shortness of Breath.    ASPIRIN (ECOTRIN) 81 MG EC TABLET    Take 81 mg by mouth once daily.    ATORVASTATIN (LIPITOR) 80 MG TABLET    Take 1 tablet by mouth once daily.    BACLOFEN (LIORESAL) 20 MG TABLET    Take 20 mg by mouth 4 (four) times daily.     BLOOD SUGAR DIAGNOSTIC (TRUE METRIX GLUCOSE TEST STRIP) STRP    1 strip by Misc.(Non-Drug; Combo Route) route 2 (two) times daily before meals.    BLOOD-GLUCOSE METER (TRUE METRIX GLUCOSE METER) MISC    1 kit by Misc.(Non-Drug; Combo Route) route 2 (two) times daily.     CARVEDILOL (COREG) 3.125 MG TABLET    TAKE ONE TABLET BY MOUTH TWICE DAILY    CLOPIDOGREL (PLAVIX) 75 MG TABLET    TAKE ONE TABLET BY MOUTH ONCE DAILY    DICLOFENAC SODIUM (VOLTAREN) 1 % GEL    Apply 2 g topically 4 (four) times daily.    FOLIC ACID (FOLVITE) 1 MG TABLET    TAKE ONE TABLET BY MOUTH DAILY    HYDROCODONE-ACETAMINOPHEN 10-325MG (NORCO)  MG TAB    Take 1 tablet by mouth once daily.    INSULIN ASPART (NOVOLOG FLEXPEN) 100 UNIT/ML INPN PEN    Inject 2 Units into the skin 2 (two) times daily.    JARDIANCE 25 MG TAB    TAKE ONE TABLET BY MOUTH ONCE DAILY    LANCETS (TRUEPLUS LANCETS) 33 GAUGE MISC    1 lancet by Misc.(Non-Drug; Combo Route) route 2 (two) times daily before meals.    LANTUS SOLOSTAR U-100 INSULIN 100 UNIT/ML (3 ML) INPN PEN    INJECT 25 UNITS SUBCUTANEAOUSLY TWICE DAILY    LISINOPRIL (PRINIVIL,ZESTRIL) 2.5 MG TABLET    TAKE ONE TABLET BY MOUTH ONCE DAILY    METFORMIN (GLUCOPHAGE) 1000 MG TABLET    TAKE ONE TABLET BY MOUTH TWICE DAILY WITH MEALS    NITROGLYCERIN (NITROSTAT) 0.4 MG SL TABLET    Place 1 tablet under the tongue as needed.    ROSUVASTATIN (CRESTOR) 20 MG TABLET    TAKE ONE TABLET BY MOUTH DAILY FOR CHOLESTEROL. STOP SIMVASTATIN   Discontinued Medications    DIAZEPAM (VALIUM) 5 MG TABLET    Take 1 tablet by mouth once daily.

## 2019-11-06 DIAGNOSIS — E78.5 TYPE 2 DIABETES MELLITUS WITH HYPERLIPIDEMIA: ICD-10-CM

## 2019-11-06 DIAGNOSIS — E53.8 B12 DEFICIENCY: Primary | ICD-10-CM

## 2019-11-06 DIAGNOSIS — E11.69 TYPE 2 DIABETES MELLITUS WITH HYPERLIPIDEMIA: ICD-10-CM

## 2019-11-06 RX ORDER — CYANOCOBALAMIN 1000 UG/ML
INJECTION, SOLUTION INTRAMUSCULAR; SUBCUTANEOUS
Qty: 10 ML | Refills: 5 | Status: SHIPPED | OUTPATIENT
Start: 2019-11-06 | End: 2020-08-10

## 2019-11-22 RX ORDER — ROSUVASTATIN CALCIUM 20 MG/1
TABLET, COATED ORAL
Qty: 90 TABLET | Refills: 1 | Status: ON HOLD | OUTPATIENT
Start: 2019-11-22 | End: 2020-05-02

## 2019-11-22 RX ORDER — METFORMIN HYDROCHLORIDE 1000 MG/1
TABLET ORAL
Qty: 180 TABLET | Refills: 1 | Status: SHIPPED | OUTPATIENT
Start: 2019-11-22 | End: 2020-03-30

## 2020-01-14 ENCOUNTER — OFFICE VISIT (OUTPATIENT)
Dept: PRIMARY CARE CLINIC | Facility: CLINIC | Age: 65
End: 2020-01-14
Payer: MEDICARE

## 2020-01-14 VITALS
BODY MASS INDEX: 21.84 KG/M2 | RESPIRATION RATE: 18 BRPM | HEIGHT: 71 IN | SYSTOLIC BLOOD PRESSURE: 136 MMHG | HEART RATE: 80 BPM | WEIGHT: 156 LBS | OXYGEN SATURATION: 97 % | DIASTOLIC BLOOD PRESSURE: 84 MMHG

## 2020-01-14 DIAGNOSIS — D48.5 NEOPLASM OF UNCERTAIN BEHAVIOR OF SKIN: ICD-10-CM

## 2020-01-14 DIAGNOSIS — I50.9 CONGESTIVE HEART FAILURE, UNSPECIFIED HF CHRONICITY, UNSPECIFIED HEART FAILURE TYPE: ICD-10-CM

## 2020-01-14 DIAGNOSIS — E53.8 B12 DEFICIENCY: Primary | ICD-10-CM

## 2020-01-14 DIAGNOSIS — Z89.511 S/P BKA (BELOW KNEE AMPUTATION) BILATERAL: ICD-10-CM

## 2020-01-14 DIAGNOSIS — F41.9 ANXIETY: ICD-10-CM

## 2020-01-14 DIAGNOSIS — I73.9 PAD (PERIPHERAL ARTERY DISEASE): ICD-10-CM

## 2020-01-14 DIAGNOSIS — Z89.512 S/P BKA (BELOW KNEE AMPUTATION) BILATERAL: ICD-10-CM

## 2020-01-14 DIAGNOSIS — E78.5 TYPE 2 DIABETES MELLITUS WITH HYPERLIPIDEMIA: ICD-10-CM

## 2020-01-14 DIAGNOSIS — J44.9 CHRONIC OBSTRUCTIVE PULMONARY DISEASE, UNSPECIFIED COPD TYPE: ICD-10-CM

## 2020-01-14 DIAGNOSIS — F33.1 MODERATE EPISODE OF RECURRENT MAJOR DEPRESSIVE DISORDER: ICD-10-CM

## 2020-01-14 DIAGNOSIS — L20.9 ATOPIC DERMATITIS, UNSPECIFIED TYPE: ICD-10-CM

## 2020-01-14 DIAGNOSIS — Z23 NEED FOR VACCINATION: ICD-10-CM

## 2020-01-14 DIAGNOSIS — E11.69 TYPE 2 DIABETES MELLITUS WITH HYPERLIPIDEMIA: ICD-10-CM

## 2020-01-14 PROCEDURE — 99999 PR PBB SHADOW E&M-EST. PATIENT-LVL III: ICD-10-PCS | Mod: PBBFAC,,, | Performed by: FAMILY MEDICINE

## 2020-01-14 PROCEDURE — 96372 THER/PROPH/DIAG INJ SC/IM: CPT | Mod: PBBFAC,PN

## 2020-01-14 PROCEDURE — 99214 PR OFFICE/OUTPT VISIT, EST, LEVL IV, 30-39 MIN: ICD-10-PCS | Mod: S$PBB,,, | Performed by: FAMILY MEDICINE

## 2020-01-14 PROCEDURE — 99999 PR PBB SHADOW E&M-EST. PATIENT-LVL III: CPT | Mod: PBBFAC,,, | Performed by: FAMILY MEDICINE

## 2020-01-14 PROCEDURE — 99214 OFFICE O/P EST MOD 30 MIN: CPT | Mod: S$PBB,,, | Performed by: FAMILY MEDICINE

## 2020-01-14 PROCEDURE — 99213 OFFICE O/P EST LOW 20 MIN: CPT | Mod: PBBFAC,PN,25 | Performed by: FAMILY MEDICINE

## 2020-01-14 RX ORDER — CYANOCOBALAMIN 1000 UG/ML
1000 INJECTION, SOLUTION INTRAMUSCULAR; SUBCUTANEOUS
Status: COMPLETED | OUTPATIENT
Start: 2020-01-14 | End: 2020-01-14

## 2020-01-14 RX ORDER — TRIAMCINOLONE ACETONIDE 1 MG/G
CREAM TOPICAL 2 TIMES DAILY
Qty: 45 G | Refills: 0 | Status: ON HOLD | OUTPATIENT
Start: 2020-01-14 | End: 2020-05-02

## 2020-01-14 RX ORDER — DULOXETIN HYDROCHLORIDE 60 MG/1
60 CAPSULE, DELAYED RELEASE ORAL DAILY
Qty: 90 CAPSULE | Refills: 1 | Status: SHIPPED | OUTPATIENT
Start: 2020-01-14 | End: 2020-05-01

## 2020-01-14 RX ADMIN — CYANOCOBALAMIN 1000 MCG: 1000 INJECTION, SOLUTION INTRAMUSCULAR at 10:01

## 2020-01-14 NOTE — PROGRESS NOTES
"Subjective:       Patient ID: Jordan Mcpherson is a 64 y.o. male.    Chief Complaint: Ear Pain (compling of pain behind his ear ); Anxiety (says the Cymbalta does not work ); and Hyperlipidemia (he is taking two statins )    Painful burning rash behind left ear off and on for the past several months.  Comes and goes intermittently, no identified triggers.  No drainage from his ear  Got 1 B12 shot at his pharmacy, but does not want to give himself injections, and does not want to go anywhere to get shots, either  No recent falls or injury.  Denies chest pain or shortness of breath.  Has been on Cymbalta for over a month, does not feel like it is helping much with his anxiety or depression.  No suicidal or homicidal ideation    Review of Systems   Constitutional: Negative for fever.   Eyes: Negative for visual disturbance.   Respiratory: Negative for shortness of breath.    Cardiovascular: Negative for chest pain.   Gastrointestinal: Negative for nausea and vomiting.   Genitourinary: Negative for difficulty urinating.   Musculoskeletal: Positive for arthralgias and gait problem.   Skin: Positive for rash.   Neurological: Negative for dizziness.   Psychiatric/Behavioral: Negative for agitation, confusion, self-injury and suicidal ideas.       Objective:      Vitals:    01/14/20 0954   BP: 136/84   BP Location: Left arm   Patient Position: Sitting   BP Method: Medium (Manual)   Pulse: 80   Resp: 18   SpO2: 97%   Weight: 70.8 kg (156 lb)   Height: 5' 11" (1.803 m)     Physical Exam   Constitutional: He is oriented to person, place, and time. He appears well-developed and well-nourished.   HENT:   Head: Normocephalic and atraumatic.   Cardiovascular: Normal rate, regular rhythm and normal heart sounds.   Pulmonary/Chest: Effort normal and breath sounds normal.   Musculoskeletal: He exhibits deformity. He exhibits no edema.   Bilateral below-the-knee amputations.  Bilateral lower leg prostheses in place.   Neurological: He is " alert and oriented to person, place, and time. Coordination normal.   Skin: Skin is warm and dry. Rash (Slightly erythematous, flaky rash behind left ear.  Large, circumferential exophytic lesion to left upper arm) noted.   Psychiatric: He has a normal mood and affect. His behavior is normal. Judgment normal.   Nursing note and vitals reviewed.      Lab Results   Component Value Date    WBC 9.90 11/04/2019    HGB 14.6 11/04/2019    HCT 44.1 11/04/2019     11/04/2019    CHOL 80 11/04/2019    TRIG 96 11/04/2019    HDL 34 (L) 11/04/2019    ALT 13 (L) 11/04/2019    AST 14 (L) 11/04/2019     11/04/2019    K 4.2 11/04/2019     (L) 11/04/2019    CREATININE 0.9 11/04/2019    BUN 24 (H) 11/04/2019    CO2 25 11/04/2019    TSH 1.98 07/13/2018    PSA 0.67 11/04/2019    HGBA1C 8.7 (H) 11/04/2019      Assessment:       1. B12 deficiency    2. Anxiety    3. Moderate episode of recurrent major depressive disorder    4. Type 2 diabetes mellitus with hyperlipidemia    5. Chronic obstructive pulmonary disease, unspecified COPD type    6. S/P BKA (below knee amputation) bilateral    7. PAD (peripheral artery disease)    8. Congestive heart failure, unspecified HF chronicity, unspecified heart failure type    9. Need for vaccination    10. Atopic dermatitis, unspecified type    11. Neoplasm of uncertain behavior of skin        Plan:       B12 deficiency  -     cyanocobalamin injection 1,000 mcg  -     CBC auto differential; Future; Expected date: 04/14/2020  -     Vitamin B12; Future; Expected date: 04/14/2020    Anxiety  -     DULoxetine (CYMBALTA) 60 MG capsule; Take 1 capsule (60 mg total) by mouth once daily.  Dispense: 90 capsule; Refill: 1  Titrate Cymbalta up  Moderate episode of recurrent major depressive disorder  -     DULoxetine (CYMBALTA) 60 MG capsule; Take 1 capsule (60 mg total) by mouth once daily.  Dispense: 90 capsule; Refill: 1    Type 2 diabetes mellitus with hyperlipidemia  -     Comprehensive  metabolic panel; Future; Expected date: 04/14/2020  -     Hemoglobin A1c; Future; Expected date: 04/14/2020  Has appointment with endocrinology in April  Chronic obstructive pulmonary disease, unspecified COPD type  Stable  S/P BKA (below knee amputation) bilateral  Stable  PAD (peripheral artery disease)  Stable  Congestive heart failure, unspecified HF chronicity, unspecified heart failure type  Appears euvolemic  Need for vaccination  Shingles vaccine order sent to pharmacy  Atopic dermatitis, unspecified type  -     triamcinolone acetonide 0.1% (KENALOG) 0.1 % cream; Apply topically 2 (two) times daily.  Dispense: 45 g; Refill: 0    Neoplasm of uncertain behavior of skin  -     Ambulatory referral to Dermatology      Medication List with Changes/Refills   New Medications    TRIAMCINOLONE ACETONIDE 0.1% (KENALOG) 0.1 % CREAM    Apply topically 2 (two) times daily.   Current Medications    ALBUTEROL (VENTOLIN HFA) 90 MCG/ACTUATION INHALER    Inhale 2 puffs into the lungs every 4 (four) hours as needed for Wheezing or Shortness of Breath.    ASPIRIN (ECOTRIN) 81 MG EC TABLET    Take 81 mg by mouth once daily.    ATORVASTATIN (LIPITOR) 80 MG TABLET    Take 1 tablet by mouth once daily.    BACLOFEN (LIORESAL) 20 MG TABLET    Take 20 mg by mouth 4 (four) times daily.     BLOOD SUGAR DIAGNOSTIC (TRUE METRIX GLUCOSE TEST STRIP) STRP    1 strip by Misc.(Non-Drug; Combo Route) route 2 (two) times daily before meals.    BLOOD-GLUCOSE METER (TRUE METRIX GLUCOSE METER) MISC    1 kit by Misc.(Non-Drug; Combo Route) route 2 (two) times daily.    CARVEDILOL (COREG) 3.125 MG TABLET    TAKE ONE TABLET BY MOUTH TWICE DAILY    CLOPIDOGREL (PLAVIX) 75 MG TABLET    TAKE ONE TABLET BY MOUTH ONCE DAILY    CYANOCOBALAMIN 1,000 MCG/ML INJECTION    1 cc SQ daily x 1 week, weekly x 4 weeks, then once a month    DICLOFENAC SODIUM (VOLTAREN) 1 % GEL    Apply 2 g topically 4 (four) times daily.    FOLIC ACID (FOLVITE) 1 MG TABLET    TAKE ONE  TABLET BY MOUTH DAILY    HYDROCODONE-ACETAMINOPHEN 10-325MG (NORCO)  MG TAB    Take 1 tablet by mouth every 6 (six) hours as needed.     INSULIN ASPART (NOVOLOG FLEXPEN) 100 UNIT/ML INPN PEN    Inject 2 Units into the skin 2 (two) times daily.    JARDIANCE 25 MG TAB    TAKE ONE TABLET BY MOUTH ONCE DAILY    LANCETS (TRUEPLUS LANCETS) 33 GAUGE MISC    1 lancet by Misc.(Non-Drug; Combo Route) route 2 (two) times daily before meals.    LANTUS SOLOSTAR U-100 INSULIN 100 UNIT/ML (3 ML) INPN PEN    INJECT 25 UNITS SUBCUTANEAOUSLY TWICE DAILY    LISINOPRIL (PRINIVIL,ZESTRIL) 2.5 MG TABLET    TAKE ONE TABLET BY MOUTH ONCE DAILY    METFORMIN (GLUCOPHAGE) 1000 MG TABLET    TAKE ONE TABLET BY MOUTH TWICE DAILY WITH MEALS    NITROGLYCERIN (NITROSTAT) 0.4 MG SL TABLET    Place 1 tablet under the tongue as needed.    ROSUVASTATIN (CRESTOR) 20 MG TABLET    TAKE ONE TABLET BY MOUTH DAILY FOR CHOLESTEROL. STOP SIMVASTATIN   Changed and/or Refilled Medications    Modified Medication Previous Medication    DULOXETINE (CYMBALTA) 60 MG CAPSULE DULoxetine (CYMBALTA) 30 MG capsule       Take 1 capsule (60 mg total) by mouth once daily.    Take 1 capsule (30 mg total) by mouth once daily.

## 2020-02-17 ENCOUNTER — OFFICE VISIT (OUTPATIENT)
Dept: PRIMARY CARE CLINIC | Facility: CLINIC | Age: 65
End: 2020-02-17
Payer: MEDICARE

## 2020-02-17 VITALS
WEIGHT: 157.06 LBS | HEIGHT: 71 IN | DIASTOLIC BLOOD PRESSURE: 74 MMHG | SYSTOLIC BLOOD PRESSURE: 104 MMHG | RESPIRATION RATE: 18 BRPM | TEMPERATURE: 98 F | BODY MASS INDEX: 21.99 KG/M2 | HEART RATE: 91 BPM | OXYGEN SATURATION: 98 %

## 2020-02-17 DIAGNOSIS — E53.8 B12 DEFICIENCY: ICD-10-CM

## 2020-02-17 DIAGNOSIS — N41.0 ACUTE PROSTATITIS: Primary | ICD-10-CM

## 2020-02-17 PROCEDURE — 99214 PR OFFICE/OUTPT VISIT, EST, LEVL IV, 30-39 MIN: ICD-10-PCS | Mod: 25,S$GLB,, | Performed by: FAMILY MEDICINE

## 2020-02-17 PROCEDURE — 99999 PR PBB SHADOW E&M-EST. PATIENT-LVL III: ICD-10-PCS | Mod: PBBFAC,,, | Performed by: FAMILY MEDICINE

## 2020-02-17 PROCEDURE — 3074F SYST BP LT 130 MM HG: CPT | Mod: CPTII,S$GLB,, | Performed by: FAMILY MEDICINE

## 2020-02-17 PROCEDURE — 3074F PR MOST RECENT SYSTOLIC BLOOD PRESSURE < 130 MM HG: ICD-10-PCS | Mod: CPTII,S$GLB,, | Performed by: FAMILY MEDICINE

## 2020-02-17 PROCEDURE — 3008F BODY MASS INDEX DOCD: CPT | Mod: CPTII,S$GLB,, | Performed by: FAMILY MEDICINE

## 2020-02-17 PROCEDURE — 96372 PR INJECTION,THERAP/PROPH/DIAG2ST, IM OR SUBCUT: ICD-10-PCS | Mod: S$GLB,,, | Performed by: FAMILY MEDICINE

## 2020-02-17 PROCEDURE — 96372 THER/PROPH/DIAG INJ SC/IM: CPT | Mod: S$GLB,,, | Performed by: FAMILY MEDICINE

## 2020-02-17 PROCEDURE — 3078F DIAST BP <80 MM HG: CPT | Mod: CPTII,S$GLB,, | Performed by: FAMILY MEDICINE

## 2020-02-17 PROCEDURE — 99214 OFFICE O/P EST MOD 30 MIN: CPT | Mod: 25,S$GLB,, | Performed by: FAMILY MEDICINE

## 2020-02-17 PROCEDURE — 3008F PR BODY MASS INDEX (BMI) DOCUMENTED: ICD-10-PCS | Mod: CPTII,S$GLB,, | Performed by: FAMILY MEDICINE

## 2020-02-17 PROCEDURE — 3078F PR MOST RECENT DIASTOLIC BLOOD PRESSURE < 80 MM HG: ICD-10-PCS | Mod: CPTII,S$GLB,, | Performed by: FAMILY MEDICINE

## 2020-02-17 PROCEDURE — 99999 PR PBB SHADOW E&M-EST. PATIENT-LVL III: CPT | Mod: PBBFAC,,, | Performed by: FAMILY MEDICINE

## 2020-02-17 RX ORDER — CYANOCOBALAMIN 1000 UG/ML
1000 INJECTION, SOLUTION INTRAMUSCULAR; SUBCUTANEOUS
Status: COMPLETED | OUTPATIENT
Start: 2020-02-17 | End: 2020-02-17

## 2020-02-17 RX ORDER — SULFAMETHOXAZOLE AND TRIMETHOPRIM 800; 160 MG/1; MG/1
1 TABLET ORAL 2 TIMES DAILY
Qty: 28 TABLET | Refills: 0 | Status: SHIPPED | OUTPATIENT
Start: 2020-02-17 | End: 2020-03-02

## 2020-02-17 RX ORDER — TAMSULOSIN HYDROCHLORIDE 0.4 MG/1
0.4 CAPSULE ORAL DAILY
Qty: 30 CAPSULE | Refills: 0 | Status: ON HOLD | OUTPATIENT
Start: 2020-02-17 | End: 2022-05-08

## 2020-02-17 RX ADMIN — CYANOCOBALAMIN 1000 MCG: 1000 INJECTION, SOLUTION INTRAMUSCULAR; SUBCUTANEOUS at 01:02

## 2020-02-17 NOTE — PROGRESS NOTES
Patient identified by name and date of birth, denies any allergies, injection administered as ordered , by aseptic technique, tolerated well by pt.

## 2020-02-17 NOTE — PROGRESS NOTES
"Subjective:       Patient ID: Jordan Mcpherson is a 64 y.o. male.    Chief Complaint: Urinary Frequency (says he is urinating about 8 times a day but very little each time )    Urinary Tract Infection    This is a new problem. The current episode started 1 to 4 weeks ago (3-4 weeks). The problem occurs every urination. The problem has been unchanged. The pain is at a severity of 0/10. There has been no fever. He is not sexually active. There is no history of pyelonephritis. Associated symptoms include frequency, hesitancy and urgency. Pertinent negatives include no behavior changes, chills, discharge, flank pain, hematuria or sweats. He has tried nothing for the symptoms. His past medical history is significant for diabetes mellitus and hypertension. There is no history of genitourinary reflux, kidney stones, recurrent UTIs, STD or a urological procedure.   has to push on lower abdomen to force urine out  Review of Systems   Constitutional: Negative for chills.   Respiratory: Negative for shortness of breath.    Cardiovascular: Negative for chest pain.   Genitourinary: Positive for frequency, hesitancy and urgency. Negative for discharge, flank pain and hematuria.       Objective:      Vitals:    02/17/20 1259   BP: 104/74   BP Location: Right arm   Patient Position: Sitting   BP Method: Medium (Manual)   Pulse: 91   Resp: 18   Temp: 97.7 °F (36.5 °C)   TempSrc: Oral   SpO2: 98%   Weight: 71.2 kg (157 lb 1.2 oz)   Height: 5' 11" (1.803 m)     Physical Exam   Constitutional: He is oriented to person, place, and time. He appears well-developed and well-nourished.   HENT:   Head: Normocephalic and atraumatic.   Cardiovascular: Normal rate, regular rhythm and normal heart sounds.   Pulmonary/Chest: Effort normal and breath sounds normal.   Genitourinary: Rectum normal. Prostate is enlarged and tender.   Neurological: He is alert and oriented to person, place, and time.   Skin: Skin is warm and dry.   Nursing note and " vitals reviewed.      Lab Results   Component Value Date    WBC 9.90 11/04/2019    HGB 14.6 11/04/2019    HCT 44.1 11/04/2019     11/04/2019    CHOL 80 11/04/2019    TRIG 96 11/04/2019    HDL 34 (L) 11/04/2019    ALT 13 (L) 11/04/2019    AST 14 (L) 11/04/2019     11/04/2019    K 4.2 11/04/2019     (L) 11/04/2019    CREATININE 0.9 11/04/2019    BUN 24 (H) 11/04/2019    CO2 25 11/04/2019    TSH 1.98 07/13/2018    PSA 0.67 11/04/2019    HGBA1C 8.7 (H) 11/04/2019      Assessment:       1. Acute prostatitis    2. B12 deficiency        Plan:       Acute prostatitis  -     sulfamethoxazole-trimethoprim 800-160mg (BACTRIM DS) 800-160 mg Tab; Take 1 tablet by mouth 2 (two) times daily. for 14 days  Dispense: 28 tablet; Refill: 0  -     tamsulosin (FLOMAX) 0.4 mg Cap; Take 1 capsule (0.4 mg total) by mouth once daily.  Dispense: 30 capsule; Refill: 0  -     Cancel: POCT urinalysis, dipstick or tablet reag  -     Cancel: Urine culture  Patient unable to void. Will treat empirically. Advised to go to ER for any acutely worsening symptoms  B12 deficiency  -     cyanocobalamin injection 1,000 mcg      Medication List with Changes/Refills   New Medications    SULFAMETHOXAZOLE-TRIMETHOPRIM 800-160MG (BACTRIM DS) 800-160 MG TAB    Take 1 tablet by mouth 2 (two) times daily. for 14 days    TAMSULOSIN (FLOMAX) 0.4 MG CAP    Take 1 capsule (0.4 mg total) by mouth once daily.   Current Medications    ALBUTEROL (VENTOLIN HFA) 90 MCG/ACTUATION INHALER    Inhale 2 puffs into the lungs every 4 (four) hours as needed for Wheezing or Shortness of Breath.    ASPIRIN (ECOTRIN) 81 MG EC TABLET    Take 81 mg by mouth once daily.    ATORVASTATIN (LIPITOR) 80 MG TABLET    Take 1 tablet by mouth once daily.    BACLOFEN (LIORESAL) 20 MG TABLET    Take 20 mg by mouth 4 (four) times daily.     BLOOD SUGAR DIAGNOSTIC (TRUE METRIX GLUCOSE TEST STRIP) STRP    1 strip by Misc.(Non-Drug; Combo Route) route 2 (two) times daily before  meals.    BLOOD-GLUCOSE METER (TRUE METRIX GLUCOSE METER) MISC    1 kit by Misc.(Non-Drug; Combo Route) route 2 (two) times daily.    CARVEDILOL (COREG) 3.125 MG TABLET    TAKE ONE TABLET BY MOUTH TWICE DAILY    CLOPIDOGREL (PLAVIX) 75 MG TABLET    TAKE ONE TABLET BY MOUTH ONCE DAILY    CYANOCOBALAMIN 1,000 MCG/ML INJECTION    1 cc SQ daily x 1 week, weekly x 4 weeks, then once a month    DICLOFENAC SODIUM (VOLTAREN) 1 % GEL    Apply 2 g topically 4 (four) times daily.    DULOXETINE (CYMBALTA) 60 MG CAPSULE    Take 1 capsule (60 mg total) by mouth once daily.    FOLIC ACID (FOLVITE) 1 MG TABLET    TAKE ONE TABLET BY MOUTH DAILY    HYDROCODONE-ACETAMINOPHEN 10-325MG (NORCO)  MG TAB    Take 1 tablet by mouth every 6 (six) hours as needed.     INSULIN ASPART (NOVOLOG FLEXPEN) 100 UNIT/ML INPN PEN    Inject 2 Units into the skin 2 (two) times daily.    JARDIANCE 25 MG TAB    TAKE ONE TABLET BY MOUTH ONCE DAILY    LANCETS (TRUEPLUS LANCETS) 33 GAUGE MISC    1 lancet by Misc.(Non-Drug; Combo Route) route 2 (two) times daily before meals.    LANTUS SOLOSTAR U-100 INSULIN 100 UNIT/ML (3 ML) INPN PEN    INJECT 25 UNITS SUBCUTANEAOUSLY TWICE DAILY    LISINOPRIL (PRINIVIL,ZESTRIL) 2.5 MG TABLET    TAKE ONE TABLET BY MOUTH ONCE DAILY    METFORMIN (GLUCOPHAGE) 1000 MG TABLET    TAKE ONE TABLET BY MOUTH TWICE DAILY WITH MEALS    NITROGLYCERIN (NITROSTAT) 0.4 MG SL TABLET    Place 1 tablet under the tongue as needed.    ROSUVASTATIN (CRESTOR) 20 MG TABLET    TAKE ONE TABLET BY MOUTH DAILY FOR CHOLESTEROL. STOP SIMVASTATIN    TRIAMCINOLONE ACETONIDE 0.1% (KENALOG) 0.1 % CREAM    Apply topically 2 (two) times daily.

## 2020-03-04 RX ORDER — FOLIC ACID 1 MG/1
TABLET ORAL
Qty: 90 TABLET | Refills: 1 | Status: SHIPPED | OUTPATIENT
Start: 2020-03-04 | End: 2020-11-09

## 2020-03-04 RX ORDER — CLOPIDOGREL BISULFATE 75 MG/1
TABLET ORAL
Qty: 90 TABLET | Refills: 1 | Status: SHIPPED | OUTPATIENT
Start: 2020-03-04 | End: 2020-09-28

## 2020-03-04 RX ORDER — LISINOPRIL 2.5 MG/1
TABLET ORAL
Qty: 90 TABLET | Refills: 1 | Status: ON HOLD | OUTPATIENT
Start: 2020-03-04 | End: 2020-05-04 | Stop reason: HOSPADM

## 2020-03-24 ENCOUNTER — TELEPHONE (OUTPATIENT)
Dept: UROLOGY | Facility: CLINIC | Age: 65
End: 2020-03-24

## 2020-03-24 ENCOUNTER — TELEPHONE (OUTPATIENT)
Dept: PRIMARY CARE CLINIC | Facility: CLINIC | Age: 65
End: 2020-03-24

## 2020-03-24 DIAGNOSIS — R33.9 INCOMPLETE BLADDER EMPTYING: Primary | ICD-10-CM

## 2020-03-24 NOTE — TELEPHONE ENCOUNTER
Spoke with pt and he denies appt with urology at this time. Pt states that he will continue force his  Urine out on his own. 3/24/20 ita

## 2020-03-24 NOTE — TELEPHONE ENCOUNTER
Patient says over the last month he has had to strain really hard to produce any urine. He says he is urinating about 8 oz three times a day and that's it. He was taking a fluid pill that his sister gave him that helped but his sister is out.  I instructed him to not take a fluid pill if it is not prescribed to him but he says he cannot urinate without it.

## 2020-03-24 NOTE — TELEPHONE ENCOUNTER
----- Message from Serena Bryant sent at 3/24/2020 10:04 AM CDT -----  Contact: Patient  Type: Needs Medical Advice    Who Called:  Jordan, patient  Symptoms (please be specific):  Can't urinate  How long has patient had these symptoms:  About a month  Pharmacy name and phone #:    [facility] - [address]  [address] [place] [address]  Phone: [phone] Fax: [phone]  Best Call Back Number: [phone]  Additional Information: Please call him. Thanks.

## 2020-03-24 NOTE — TELEPHONE ENCOUNTER
He says he has taken all of the Flomax and it did not help, at all. He has increased his fluid intake as well.

## 2020-03-30 RX ORDER — METFORMIN HYDROCHLORIDE 1000 MG/1
TABLET ORAL
Qty: 180 TABLET | Refills: 1 | Status: SHIPPED | OUTPATIENT
Start: 2020-03-30 | End: 2020-10-14

## 2020-03-30 NOTE — TELEPHONE ENCOUNTER
----- Message from Serena Bryant sent at 3/30/2020  9:39 AM CDT -----  Contact: Patient  Type: Needs Medical Advice    Who Called:  Jordan patient  Symptoms (please be specific):  Groin pain  How long has patient had these symptoms:  1 week  Pharmacy name and phone #:    [facility] - [address]  [address] [place] [address]  Phone: [phone] Fax: [phone]  Best Call Back Number: [phone]  Additional Information: Wants an appointment. Please call him. Thanks.

## 2020-03-30 NOTE — TELEPHONE ENCOUNTER
Spoke with patient states he has groin pain for a week now. He also can not empty his bladder. He urinates every 4-6 hours and only a little at each time. Advised him to either call urology or go to the ER since he can not empty his bladder. States he will not call urology because they want to do a catheter, he will try the ER.

## 2020-05-01 DIAGNOSIS — F41.9 ANXIETY: ICD-10-CM

## 2020-05-01 DIAGNOSIS — F33.1 MODERATE EPISODE OF RECURRENT MAJOR DEPRESSIVE DISORDER: ICD-10-CM

## 2020-05-01 DIAGNOSIS — I10 ESSENTIAL HYPERTENSION: ICD-10-CM

## 2020-05-01 RX ORDER — DULOXETIN HYDROCHLORIDE 60 MG/1
60 CAPSULE, DELAYED RELEASE ORAL DAILY
Qty: 90 CAPSULE | Refills: 1 | Status: SHIPPED | OUTPATIENT
Start: 2020-05-01 | End: 2020-11-09

## 2020-05-01 RX ORDER — CARVEDILOL 3.12 MG/1
TABLET ORAL
Qty: 180 TABLET | Refills: 1 | Status: SHIPPED | OUTPATIENT
Start: 2020-05-01 | End: 2020-08-11

## 2020-05-02 PROBLEM — R07.9 CHEST PAIN: Status: ACTIVE | Noted: 2020-05-02

## 2020-05-04 PROBLEM — R07.9 CHEST PAIN: Status: RESOLVED | Noted: 2020-05-02 | Resolved: 2020-05-04

## 2020-05-17 ENCOUNTER — NURSE TRIAGE (OUTPATIENT)
Dept: ADMINISTRATIVE | Facility: CLINIC | Age: 65
End: 2020-05-17

## 2020-05-17 NOTE — TELEPHONE ENCOUNTER
Attempted to contact patient through post procedure symptom monitoring program. No contact made. Triage RN will make second attempt tomorrow.      Reason for Disposition   No answer.  First attempt to contact caller.  Follow-up call scheduled within 15 minutes.    Additional Information   Negative: Caller is angry or rude (e.g., hangs up, verbally abusive, yelling)   Negative: Caller hangs up   Negative: Caller has already spoken with the PCP and has no further questions.   Negative: Caller has already spoken with another triager and has no further questions.   Negative: Caller has already spoken with another triager or PCP AND has further questions AND triager able to answer questions.   Negative: Busy signal.  First attempt to contact caller.  Follow-up call scheduled within 15 minutes.    Protocols used: NO CONTACT OR DUPLICATE CONTACT CALL-A-AH

## 2020-05-18 NOTE — TELEPHONE ENCOUNTER
Called for post procedural symptom tracker. No answer after 2nd attempt.    Reason for Disposition   Second attempt to contact family AND no contact made. Phone number verified.    Additional Information   Negative: Caller has already spoken with the PCP (or office), and has no further questions   Negative: Caller has already spoken with another triager and has no further questions   Negative: Caller has already spoken with another triager or PCP (or office), and has further questions and triager able to answer questions.   Negative: Busy signal.  First attempt to contact caller.  Follow-up call scheduled within 15 minutes.   Negative: No answer.  First attempt to contact caller.  Follow-up call scheduled within 15 minutes.   Negative: Message left on identified voicemail   Negative: Message left on unidentified voice mail. Phone number verified.   Negative: Message left with person in household   Negative: Wrong number reached. Phone number verified.    Protocols used: NO CONTACT OR DUPLICATE CONTACT CALL-A-OH

## 2020-05-22 ENCOUNTER — TELEPHONE (OUTPATIENT)
Dept: PRIMARY CARE CLINIC | Facility: CLINIC | Age: 65
End: 2020-05-22

## 2020-05-22 NOTE — TELEPHONE ENCOUNTER
Patients he has a sore throat and left ear pain. I instructed patient to try tylenol and let me know if it does not help. He states understanding

## 2020-05-22 NOTE — TELEPHONE ENCOUNTER
----- Message from Melina Ritchie sent at 5/22/2020  2:19 PM CDT -----  Contact: self/[phone]  .1 Patient would like to get medical advice.  Symptoms (please be specific):ear infection   How long has patient had these symptoms: 4 days ago  Pharmacy name and phone#:[facility] - [address] [phone] (Phone) [phone] (Fax)  Any drug allergies: onfile  Comments: Patient would like to get medical advice. Patient does not want to come in to the clinic, he is requesting antibiotic to be sent to pharmacy. Thank you

## 2020-08-10 ENCOUNTER — TELEPHONE (OUTPATIENT)
Dept: PRIMARY CARE CLINIC | Facility: CLINIC | Age: 65
End: 2020-08-10

## 2020-08-10 ENCOUNTER — OFFICE VISIT (OUTPATIENT)
Dept: PRIMARY CARE CLINIC | Facility: CLINIC | Age: 65
End: 2020-08-10
Payer: MEDICARE

## 2020-08-10 VITALS
HEART RATE: 86 BPM | WEIGHT: 154 LBS | DIASTOLIC BLOOD PRESSURE: 64 MMHG | RESPIRATION RATE: 20 BRPM | HEIGHT: 71 IN | SYSTOLIC BLOOD PRESSURE: 138 MMHG | BODY MASS INDEX: 21.56 KG/M2 | OXYGEN SATURATION: 97 %

## 2020-08-10 DIAGNOSIS — Z74.09 IMPAIRED MOBILITY: Primary | ICD-10-CM

## 2020-08-10 DIAGNOSIS — I50.32 CHRONIC DIASTOLIC CONGESTIVE HEART FAILURE: ICD-10-CM

## 2020-08-10 DIAGNOSIS — J44.9 CHRONIC OBSTRUCTIVE PULMONARY DISEASE, UNSPECIFIED COPD TYPE: ICD-10-CM

## 2020-08-10 DIAGNOSIS — Z89.512 S/P BKA (BELOW KNEE AMPUTATION) BILATERAL: ICD-10-CM

## 2020-08-10 DIAGNOSIS — I25.10 CORONARY ARTERY DISEASE INVOLVING NATIVE CORONARY ARTERY OF NATIVE HEART WITHOUT ANGINA PECTORIS: ICD-10-CM

## 2020-08-10 DIAGNOSIS — Z79.4 TYPE 2 DIABETES MELLITUS WITH HYPERGLYCEMIA, WITH LONG-TERM CURRENT USE OF INSULIN: ICD-10-CM

## 2020-08-10 DIAGNOSIS — E11.65 TYPE 2 DIABETES MELLITUS WITH HYPERGLYCEMIA, WITH LONG-TERM CURRENT USE OF INSULIN: ICD-10-CM

## 2020-08-10 DIAGNOSIS — Z89.511 S/P BKA (BELOW KNEE AMPUTATION) BILATERAL: ICD-10-CM

## 2020-08-10 PROCEDURE — 99213 PR OFFICE/OUTPT VISIT, EST, LEVL III, 20-29 MIN: ICD-10-PCS | Mod: S$GLB,,, | Performed by: FAMILY MEDICINE

## 2020-08-10 PROCEDURE — 99999 PR PBB SHADOW E&M-EST. PATIENT-LVL V: ICD-10-PCS | Mod: PBBFAC,,, | Performed by: FAMILY MEDICINE

## 2020-08-10 PROCEDURE — 3046F HEMOGLOBIN A1C LEVEL >9.0%: CPT | Mod: CPTII,S$GLB,, | Performed by: FAMILY MEDICINE

## 2020-08-10 PROCEDURE — 3075F PR MOST RECENT SYSTOLIC BLOOD PRESS GE 130-139MM HG: ICD-10-PCS | Mod: CPTII,S$GLB,, | Performed by: FAMILY MEDICINE

## 2020-08-10 PROCEDURE — 3046F PR MOST RECENT HEMOGLOBIN A1C LEVEL > 9.0%: ICD-10-PCS | Mod: CPTII,S$GLB,, | Performed by: FAMILY MEDICINE

## 2020-08-10 PROCEDURE — 3008F PR BODY MASS INDEX (BMI) DOCUMENTED: ICD-10-PCS | Mod: CPTII,S$GLB,, | Performed by: FAMILY MEDICINE

## 2020-08-10 PROCEDURE — 99999 PR PBB SHADOW E&M-EST. PATIENT-LVL V: CPT | Mod: PBBFAC,,, | Performed by: FAMILY MEDICINE

## 2020-08-10 PROCEDURE — 3008F BODY MASS INDEX DOCD: CPT | Mod: CPTII,S$GLB,, | Performed by: FAMILY MEDICINE

## 2020-08-10 PROCEDURE — 3078F DIAST BP <80 MM HG: CPT | Mod: CPTII,S$GLB,, | Performed by: FAMILY MEDICINE

## 2020-08-10 PROCEDURE — 3078F PR MOST RECENT DIASTOLIC BLOOD PRESSURE < 80 MM HG: ICD-10-PCS | Mod: CPTII,S$GLB,, | Performed by: FAMILY MEDICINE

## 2020-08-10 PROCEDURE — 99213 OFFICE O/P EST LOW 20 MIN: CPT | Mod: S$GLB,,, | Performed by: FAMILY MEDICINE

## 2020-08-10 PROCEDURE — 3075F SYST BP GE 130 - 139MM HG: CPT | Mod: CPTII,S$GLB,, | Performed by: FAMILY MEDICINE

## 2020-08-10 NOTE — PROGRESS NOTES
"Subjective:       Patient ID: Jordan Mcpherson is a 64 y.o. male.    Chief Complaint: Wheelchair (says he needs a new order a power chair )    Last power operated vehicle was dispensed for 5 years ago, has multiple malfunctioning part, rendering it inoperable.  Gets short of breath with activity due to COPD.  No recent angina.  Patient is status post bilateral below-the-knee amputations.  Has frequent falls, though no recent injuries.    Review of Systems   HENT: Negative for trouble swallowing.    Respiratory: Positive for shortness of breath.    Cardiovascular: Negative for chest pain.   Gastrointestinal: Negative for nausea and vomiting.   Musculoskeletal: Positive for gait problem.   Skin: Negative for wound.   Neurological: Negative for dizziness and numbness.   Psychiatric/Behavioral: Negative for agitation and confusion.       Objective:      Vitals:    08/10/20 1548   BP: 138/64   BP Location: Left arm   Patient Position: Sitting   BP Method: Medium (Manual)   Pulse: 86   Resp: 20   SpO2: 97%   Weight: 69.9 kg (154 lb)   Height: 5' 11" (1.803 m)     Physical Exam  Vitals signs and nursing note reviewed.   Constitutional:       Appearance: He is well-developed.   HENT:      Head: Normocephalic and atraumatic.   Cardiovascular:      Rate and Rhythm: Normal rate and regular rhythm.      Heart sounds: Normal heart sounds.   Pulmonary:      Effort: Pulmonary effort is normal.      Breath sounds: Normal breath sounds.   Musculoskeletal:         General: Deformity present.      Comments: Bilateral below-the-knee amputations.  Bilateral lower leg prostheses in place.   Skin:     General: Skin is warm and dry.   Neurological:      Mental Status: He is alert and oriented to person, place, and time.      Coordination: Coordination normal.   Psychiatric:         Behavior: Behavior normal.         Judgment: Judgment normal.         Lab Results   Component Value Date    WBC 9.50 05/04/2020    HGB 14.3 05/04/2020    HCT 42.9 " 05/04/2020     05/04/2020    CHOL 80 11/04/2019    TRIG 96 11/04/2019    HDL 34 (L) 11/04/2019    ALT 10 (L) 05/01/2020    AST 12 (L) 05/01/2020     (L) 05/04/2020    K 4.0 05/04/2020     05/04/2020    CREATININE 0.6 05/04/2020    BUN 17 05/04/2020    CO2 25 05/04/2020    TSH 1.98 07/13/2018    PSA 0.67 11/04/2019    HGBA1C 9.9 (H) 05/01/2020      Assessment:       1. Impaired mobility    2. S/P BKA (below knee amputation) bilateral    3. Coronary artery disease involving native coronary artery of native heart without angina pectoris    4. Chronic obstructive pulmonary disease, unspecified COPD type    5. Chronic diastolic congestive heart failure    6. Type 2 diabetes mellitus with hyperglycemia, with long-term current use of insulin        Plan:       Impaired mobility  Needs POV for mobility assistance.  Patient is capable and willing to operate this device to help with ADLs  S/P BKA (below knee amputation) bilateral    Coronary artery disease involving native coronary artery of native heart without angina pectoris    Chronic obstructive pulmonary disease, unspecified COPD type    Chronic diastolic congestive heart failure    Type 2 diabetes mellitus with hyperglycemia, with long-term current use of insulin  -     Ambulatory referral/consult to Diabetes Education; Future; Expected date: 08/17/2020  -     Ambulatory referral/consult to Endocrinology; Future; Expected date: 08/17/2020      Medication List with Changes/Refills   Current Medications    ALBUTEROL (VENTOLIN HFA) 90 MCG/ACTUATION INHALER    Inhale 2 puffs into the lungs every 4 (four) hours as needed for Wheezing or Shortness of Breath.    ASPIRIN (ECOTRIN) 81 MG EC TABLET    Take 81 mg by mouth once daily.    ATORVASTATIN (LIPITOR) 80 MG TABLET    Take 1 tablet (80 mg total) by mouth once daily.    BACLOFEN (LIORESAL) 20 MG TABLET    Take 20 mg by mouth 5 (five) times daily. Takes 5 times daily. Total of 120 mg    CARVEDILOL (COREG)  3.125 MG TABLET    TAKE ONE TABLET BY MOUTH TWICE DAILY    CLOPIDOGREL (PLAVIX) 75 MG TABLET    TAKE ONE TABLET BY MOUTH ONCE DAILY    DULOXETINE (CYMBALTA) 60 MG CAPSULE    TAKE 1 CAPSULE (60 MG TOTAL) BY MOUTH ONCE DAILY.    FOLIC ACID (FOLVITE) 1 MG TABLET    TAKE ONE TABLET BY MOUTH DAILY    HYDROCODONE-ACETAMINOPHEN 10-325MG (NORCO)  MG TAB    Take 1 tablet by mouth every 6 (six) hours as needed.     INSULIN ASPART (NOVOLOG FLEXPEN) 100 UNIT/ML INPN PEN    Inject 2 Units into the skin 2 (two) times daily.    JARDIANCE 25 MG TAB    TAKE ONE TABLET BY MOUTH ONCE DAILY    LANCETS (TRUEPLUS LANCETS) 33 GAUGE MISC    1 lancet by Misc.(Non-Drug; Combo Route) route 2 (two) times daily before meals.    LANTUS SOLOSTAR U-100 INSULIN 100 UNIT/ML (3 ML) INPN PEN    INJECT 25 UNITS SUBCUTANEAOUSLY TWICE DAILY    LISINOPRIL (PRINIVIL,ZESTRIL) 5 MG TABLET    Take 1 tablet (5 mg total) by mouth once daily.    METFORMIN (GLUCOPHAGE) 1000 MG TABLET    TAKE ONE TABLET BY MOUTH TWICE DAILY WITH MEALS    NITROGLYCERIN (NITROSTAT) 0.4 MG SL TABLET    Place 1 tablet (0.4 mg total) under the tongue every 5 (five) minutes as needed for Chest pain.    TAMSULOSIN (FLOMAX) 0.4 MG CAP    Take 1 capsule (0.4 mg total) by mouth once daily.   Discontinued Medications    BLOOD SUGAR DIAGNOSTIC (TRUE METRIX GLUCOSE TEST STRIP) STRP    1 strip by Misc.(Non-Drug; Combo Route) route 2 (two) times daily before meals.    BLOOD-GLUCOSE METER (TRUE METRIX GLUCOSE METER) MISC    1 kit by Misc.(Non-Drug; Combo Route) route 2 (two) times daily.    CYANOCOBALAMIN 1,000 MCG/ML INJECTION    1 cc SQ daily x 1 week, weekly x 4 weeks, then once a month

## 2020-08-10 NOTE — TELEPHONE ENCOUNTER
----- Message from Mike Morley MD sent at 8/10/2020  4:17 PM CDT -----  Needs new POV ordered. Please see my office note from 8/10/2020

## 2020-08-10 NOTE — LETTER
September 8, 2020    Jordan Mcpherson  [address] [place] [address]             [facility]  [address]  [place] [address]  Phone: [phone]  Fax: [phone] To whom it may concern:    Mr. Mcpherson has numerous chronic medical conditions, including, but not limited to, COPD, chronic diastolic congestive heart failure, peripheral arterial disease, and bilateral below the knee amputations, with a fair prognosis given his multiple comorbidities.  He has bilateral BKA leg prostheses, which allows him to ambulate in a limited capacity within the home. Due to limited strength and dyspnea, he has a history of recurrent falls. Because of his chronic lung disease and CHF, a manual wheelchair and cane/walker are insufficient to meet his mobility needs. His limited mobility poses significant challenges with regard to bathing, grooming and moving from room to room in his home. He has both the physical and mental ability to safely operated a power wheelchair, and he is sufficiently willing and motivated to use a power wheelchair in his home.     Respectfully,        Mike Morley MD

## 2020-08-11 DIAGNOSIS — I10 ESSENTIAL HYPERTENSION: ICD-10-CM

## 2020-08-11 RX ORDER — CARVEDILOL 3.12 MG/1
TABLET ORAL
Qty: 180 TABLET | Refills: 3 | Status: SHIPPED | OUTPATIENT
Start: 2020-08-11 | End: 2021-08-24

## 2020-08-12 NOTE — TELEPHONE ENCOUNTER
[facility] is requesting a letter on a letterhead for patient POV. Please see the example in your folder.

## 2020-08-19 ENCOUNTER — PATIENT OUTREACH (OUTPATIENT)
Dept: ADMINISTRATIVE | Facility: OTHER | Age: 65
End: 2020-08-19

## 2020-08-19 DIAGNOSIS — E78.5 TYPE 2 DIABETES MELLITUS WITH HYPERLIPIDEMIA: Primary | ICD-10-CM

## 2020-08-19 DIAGNOSIS — E11.69 TYPE 2 DIABETES MELLITUS WITH HYPERLIPIDEMIA: Primary | ICD-10-CM

## 2020-08-19 NOTE — PROGRESS NOTES
LINKS immunization registry updated  Care Everywhere updated  Health Maintenance updated  Chart reviewed for overdue Proactive [facility] Encounters ([facility]) health maintenance testing (CRS, Breast Ca, Diabetic Eye Exam)   Orders entered: diabetic eye screening photo

## 2020-08-20 ENCOUNTER — CLINICAL SUPPORT (OUTPATIENT)
Dept: DIABETES | Facility: CLINIC | Age: 65
End: 2020-08-20
Payer: MEDICARE

## 2020-08-20 VITALS — WEIGHT: 154 LBS | HEIGHT: 71 IN | BODY MASS INDEX: 21.56 KG/M2

## 2020-08-20 DIAGNOSIS — E11.65 TYPE 2 DIABETES MELLITUS WITH HYPERGLYCEMIA, WITH LONG-TERM CURRENT USE OF INSULIN: ICD-10-CM

## 2020-08-20 DIAGNOSIS — Z79.4 TYPE 2 DIABETES MELLITUS WITH HYPERGLYCEMIA, WITH LONG-TERM CURRENT USE OF INSULIN: ICD-10-CM

## 2020-08-20 DIAGNOSIS — Z79.4 TYPE 2 DIABETES MELLITUS WITH HYPERGLYCEMIA, WITH LONG-TERM CURRENT USE OF INSULIN: Primary | ICD-10-CM

## 2020-08-20 DIAGNOSIS — I50.32 CHRONIC DIASTOLIC CONGESTIVE HEART FAILURE: Primary | ICD-10-CM

## 2020-08-20 DIAGNOSIS — E11.65 TYPE 2 DIABETES MELLITUS WITH HYPERGLYCEMIA, WITH LONG-TERM CURRENT USE OF INSULIN: Primary | ICD-10-CM

## 2020-08-20 DIAGNOSIS — E78.5 TYPE 2 DIABETES MELLITUS WITH HYPERLIPIDEMIA: Primary | ICD-10-CM

## 2020-08-20 DIAGNOSIS — E11.69 TYPE 2 DIABETES MELLITUS WITH HYPERLIPIDEMIA: Primary | ICD-10-CM

## 2020-08-20 PROCEDURE — 99999 PR PBB SHADOW E&M-EST. PATIENT-LVL III: CPT | Mod: PBBFAC,,, | Performed by: DIETITIAN, REGISTERED

## 2020-08-20 PROCEDURE — 99999 PR PBB SHADOW E&M-EST. PATIENT-LVL III: ICD-10-PCS | Mod: PBBFAC,,, | Performed by: DIETITIAN, REGISTERED

## 2020-08-20 PROCEDURE — G0108 PR DIAB MANAGE TRN  PER INDIV: ICD-10-PCS | Mod: S$GLB,,, | Performed by: DIETITIAN, REGISTERED

## 2020-08-20 PROCEDURE — G0108 DIAB MANAGE TRN  PER INDIV: HCPCS | Mod: S$GLB,,, | Performed by: DIETITIAN, REGISTERED

## 2020-08-20 RX ORDER — INSULIN PUMP SYRINGE, 3 ML
EACH MISCELLANEOUS
Qty: 1 EACH | Refills: 0 | Status: SHIPPED | OUTPATIENT
Start: 2020-08-20 | End: 2021-08-20

## 2020-08-20 NOTE — TELEPHONE ENCOUNTER
----- Message from Jayce Reed sent at 8/20/2020  3:57 PM CDT -----  Pharmacy is calling to clarify an RX.  RX name:  blood-glucose meter kit  What do they need to clarify:  Directions  Comments: She needs an additional prescription for the strips with the diagnosis code and the directions

## 2020-08-20 NOTE — PROGRESS NOTES
"Diabetes Education  Author: Tracie Armas RD, CDE  Date: 8/20/2020    Diabetes Care Management Summary  Diabetes Education Record Assessment/Progress: Initial  Current Diabetes Risk Level: High     Last A1c:   Lab Results   Component Value Date    HGBA1C 9.9 (H) 05/01/2020     Last Visit with Diabetes Educator: Last Education Visit: Not Found  Last OPCM Referral: : Not Found   Enrolled in OPCM: No      Diabetes Type  Diabetes Type : Type II    Diabetes History  Diabetes Diagnosis: 3-5 years  Current Treatment: Oral Medication, Insulin(metformin 1000mg BID, lantus 25U BID, humalog sliding scale qAC)  Reviewed Problem List with Patient: Yes    Health Maintenance was reviewed today with patient. Discussed with patient importance of routine eye exams, foot exams/foot care, blood work (i.e.: A1c, microalbumin, and lipid), dental visits, yearly flu vaccine, and pneumonia vaccine as indicated by PCP. Patient verbalized understanding.     Health Maintenance Topics with due status: Not Due       Topic Last Completion Date    TETANUS VACCINE 11/14/2018    Colorectal Cancer Screening 12/13/2018    PROSTATE-SPECIFIC ANTIGEN 11/04/2019    Influenza Vaccine 11/04/2019    Lipid Panel 11/04/2019    Hemoglobin A1c 05/01/2020    High Dose Statin 08/10/2020     Health Maintenance Due   Topic Date Due    HIV Screening  10/29/1970    Shingles Vaccine (1 of 2) 10/29/2007    Eye Exam  05/07/2020       Nutrition  Meal Planning: skipping meals, eats out often, snacks between meal  What type of sweetener do you use?: sugar  What type of beverages do you drink?: regular soda/tea(sweet tea)  Meal Plan 24 Hour Recall - Breakfast: "anything I want" sometimes eggs and sausage  Meal Plan 24 Hour Recall - Lunch: skipped  Meal Plan 24 Hour Recall - Dinner: whatever I feel like, might be something I cook like last night homemade thick cut fries or something out  Meal Plan 24 Hour Recall - Snack: Sweet Tea, fruit, cakes, pies    Monitoring   Self " "Monitoring : four times a day if his glucometer is working - lately been wasting up to 6 strips a day - glucometer reading err each time  Blood Glucose Logs: No  Do you use a personal continuous glucose monitor?: No(would like to get one)  In the last month, how often have you had a low blood sugar reaction?: never  What are your symptoms of low blood sugar?: "I don't go low"(reviewed s/s)  How do you treat low blood sugar?: drink a coke  Can you tell when your blood sugar is too high?: no  How do you treat high blood sugar?: take medication    Patient would benefit from CGM therapy to better help us manage his T2DM and better adjust insulin regimen.     Exercise   Exercise Type: none(bilateral BKA)  Frequency: Never    Current Diabetes Treatment   Current Treatment: Oral Medication, Insulin(metformin 1000mg BID, lantus 25U BID, humalog sliding scale qAC)    Social History  Preferred Learning Method: Face to Face, Hands On  Primary Support: Self  Educational Level: High School  Occupation: disabled  Smoking Status: Current Smoker(not ready to quit)  Alcohol Use: Never(quit 8 years ago)            DDS-2 Score  ( > 3 = SIGNIFICANT DISTRESS): 1                   Barriers to Change  Barriers to Change: None  Learning Challenges : None    Readiness to Learn   Readiness to Learn : Non Acceptance    Cultural Influences  Cultural Influences: No    Diabetes Education Assessment/Progress  Diabetes Disease Process (diabetes disease process and treatment options): Comprehends Key Points, Discussion, Instructed, Individual Session, Written Materials Provided  Nutrition (Incorporating nutritional management into one's lifestyle): Comprehends Key Points, Discussion, Instructed, Individual Session, Written Materials Provided  Physical Activity (incorporating physical activity into one's lifestyle): Comprehends Key Points, Discussion, Instructed, Individual Session, Written Materials Provided  Medications (states correct name, dose, " onset, peak, duration, side effects & timing of meds): Comprehends Key Points, Discussion, Instructed, Individual Session, Written Materials Provided  Monitoring (monitoring blood glucose/other parameters & using results): Discussion, Instructed, Individual Session, Written Materials Provided, Comprehends Key Points  Acute Complications (preventing, detecting, and treating acute complications): Discussion, Instructed, Individual Session, Written Materials Provided, Comprehends Key Points  Chronic Complications (preventing, detecting, and treating chronic complications): Discussion, Instructed, Individual Session, Written Materials Provided, Comprehends Key Points  Clinical (diabetes, other pertinent medical history, and relevant comorbidities reviewed during visit): Discussion, Instructed, Individual Session, Written Materials Provided, Comprehends Key Points  Cognitive (knowledge of self-management skills, functional health literacy): Discussion, Instructed, Individual Session, Written Materials Provided, Comprehends Key Points  Psychosocial (emotional response to diabetes): Discussion, Instructed, Individual Session, Comprehends Key Points, Written Materials Provided  Diabetes Distress and Support Systems: Discussion, Instructed, Individual Session, Comprehends Key Points, Written Materials Provided  Behavioral (readiness for change, lifestyle practices, self-care behaviors): Discussion, Instructed, Individual Session, Written Materials Provided, Comprehends Key Points  Patient educated on what is DM, T1DM, T2DM, risk factors, managing DM, DM diet, carbohydrate counting, meal planning, reading a food label, healthy snack options, benefits of physical activity, diabetes care schedule, foot care guidelines, diabetes and retinopathy screening, s/s hypo and hyperglycemia, long/short term complication of uncontrolled DM, importance of compliance with treatment plan, how to use a glucometer, reviewed understanding  diabetes distress, medications for treating DM, their mechanism of action and possible side effects, reviewed current level and goal level for HgbA1c, blood glucose, microalbumin, and lipids. Patient provided with written literature, diabetes management resources and support, DM Management program contact information, glucometer, 10 strips and 10 lancets.    Goals  Patient has selected/evaluated goals during today's session: Yes, evaluated  Monitoring: % Met  Met Percentage : 50%         Diabetes Care Plan/Intervention  Education Plan/Intervention: Individual Follow-Up DSMT    Diabetes Meal Plan  Restrictions: Low Fat, Low Sodium, Restricted Carbohydrate  Calories: 1800  Carbohydrate Per Meal: 20-30g  Carbohydrate Per Snack : 7-15g  Fat: 50  Protein: 135    Today's Self-Management Care Plan was developed with the patient's input and is based on barriers identified during today's assessment.    The long and short-term goals in the care plan were written with the patient/caregiver's input. The patient has agreed to work toward these goals to improve his overall diabetes control.      The patient received a copy of today's self-management plan and verbalized understanding of the care plan, goals, and all of today's instructions.      The patient was encouraged to communicate with his physician and care team regarding his condition(s) and treatment.  I provided the patient with my contact information today and encouraged him to contact me via phone or patient portal as needed.     Education Units of Time   Time Spent: 60 min

## 2020-09-16 ENCOUNTER — TELEPHONE (OUTPATIENT)
Dept: PRIMARY CARE CLINIC | Facility: CLINIC | Age: 65
End: 2020-09-16

## 2020-09-16 NOTE — TELEPHONE ENCOUNTER
The letter that was written on 09/08 needs an addendum created that states he cannot have a scooter because he cannot get around his house in it.

## 2020-09-16 NOTE — LETTER
September 16, 2020    Jordan Mcpherson  [address] [place] [address]             [facility]  [address]  [place] [address]  Phone: [phone]  Fax: [phone] To whom it may concern:    Mr. Mcpherson has numerous chronic medical conditions, including, but not limited to, COPD, chronic diastolic congestive heart failure, peripheral arterial disease, and bilateral below the knee amputations, with a fair prognosis given his multiple comorbidities.  He has bilateral BKA leg prostheses, which allows him to ambulate in a limited capacity within the home. Due to limited strength and dyspnea, he has a history of recurrent falls. Because of his chronic lung disease and CHF, a manual wheelchair and cane/walker are insufficient to meet his mobility needs. Also, a scooter is insufficient to address his mobility needs because he cannot get around his house in it, largely due to the layout of his house. His limited mobility poses significant challenges with regard to bathing, grooming and moving from room to room in his home. He has both the physical and mental ability to safely operated a power wheelchair, and he is sufficiently willing and motivated to use a power wheelchair in his home.     Respectfully,        Mike Morley MD

## 2020-09-16 NOTE — TELEPHONE ENCOUNTER
----- Message from Unique Saldaña sent at 9/16/2020  8:46 AM CDT -----  Contact: Patient [phone]  PATIENT IS REQUESTING TO SPEAK WITH YOU REGARDING HIS POWER CHAIR.    Please call and advise.    Thank You

## 2020-09-16 NOTE — TELEPHONE ENCOUNTER
Patient cannot use a normal wheelchair due to the way his home is set up. Patient came by and says he talked to [facility]alia and says they are still waiting on a prescription. I informed the patient that all this has been faxed but I will contact them

## 2020-09-17 ENCOUNTER — TELEPHONE (OUTPATIENT)
Dept: PRIMARY CARE CLINIC | Facility: CLINIC | Age: 65
End: 2020-09-17

## 2020-09-17 NOTE — TELEPHONE ENCOUNTER
----- Message from Melina Ritchie sent at 9/17/2020  1:49 PM CDT -----  Contact: Jonathan/[phone]  Jonathan from  [facility]  want to check if fax for the power wheelchair has been receive . Please call and advise. Thank you

## 2020-09-18 ENCOUNTER — TELEPHONE (OUTPATIENT)
Dept: PRIMARY CARE CLINIC | Facility: CLINIC | Age: 65
End: 2020-09-18

## 2020-09-18 NOTE — TELEPHONE ENCOUNTER
----- Message from Chelsey Daniels sent at 9/18/2020 12:05 PM CDT -----  Contact: Jonathna/[facility]/ [phone]  Jonathan with [facility] is calling to confirm receipt of fax from 9/17/20. Please call to confirm at

## 2020-09-22 ENCOUNTER — TELEPHONE (OUTPATIENT)
Dept: PRIMARY CARE CLINIC | Facility: CLINIC | Age: 65
End: 2020-09-22

## 2020-09-22 NOTE — TELEPHONE ENCOUNTER
----- Message from Meghan Diaz sent at 9/22/2020  9:35 AM CDT -----  Regarding: MsJuan C Rose Assistant @ [facility] # [phone], Ext [phone]  ref [phone] fax# [phone]  Ms. Rose is calling in regards to her wanting to know if you have received a physician order form that she faxed over to you on 09/09/2020 for the patient?

## 2020-09-23 ENCOUNTER — PATIENT OUTREACH (OUTPATIENT)
Dept: ADMINISTRATIVE | Facility: OTHER | Age: 65
End: 2020-09-23

## 2020-09-23 NOTE — PROGRESS NOTES
LINKS immunization registry updated  Care Everywhere updated  Health Maintenance updated  Chart reviewed for overdue Proactive [facility] Encounters ([facility]) health maintenance testing (CRS, Breast Ca, Diabetic Eye Exam)   Orders entered:N/A

## 2020-09-23 NOTE — PROGRESS NOTES
CC:   Chief Complaint   Patient presents with    Diabetes Mellitus       HPI: Jordan Mcpherson is a 64 y.o. male presents for an initial visit today for the management of T2DM.     He was diagnosed with Type 2 diabetes before [place] on routine lab work and started on Metformin. He started in insulin 2005    He presents today reporting noncompliance with his Lantus.  He rarely takes it b.i.d. and will miss it a couple days a week.  He does report compliance with his metformin.  Jardiance is listed on his medication list however patient does not recall if he has ever taken it.  On pharmacy review he has not filled it in over a year.  He also suffers with dietary indiscretions.  Commonly snacking overnight on pancakes and honey buns.  He also drinks sugary beverages such as sweet tea and regular Gatorade.  He he has met with diabetes education in the past to review diabetic diet-remains noncompliant.   He is due for lab work at this time.  Of note he does have a significant cardiovascular history.  He presents today with a rolling walker, bilateral BKAs with prosthesis in place.      Family hx of diabetes: mother, aunts, uncles, cousins  Hospitalized for diabetes: denies     No personal or FH of thyroid cancer or personal of pancreatic cancer or pancreatitis.     DIABETES COMPLICATIONS: nephropathy, peripheral neuropathy, cardiovascular disease, cerebrovascular disease, peripheral vascular disease and bilateral BKA    4 MI- 5 stents placed.   7 CVA     PAD with bilateral stent placement to legs.     Diabetes Management Status    ASA:  Yes - 81 mg daily and plavix     Statin: Taking  ACE/ARB: Taking    Screening or Prevention Patient's value Goal Complete/Controlled?   HgA1C Testing and Control   Lab Results   Component Value Date    HGBA1C 9.9 (H) 05/01/2020      Annually/Less than 8% No   Lipid profile : 11/04/2019 Annually Yes   LDL control Lab Results   Component Value Date    LDLCALC 27 11/04/2019     Annually/Less than 100 mg/dl  Yes   Nephropathy screening Lab Results   Component Value Date    LABMICR 59.0 07/13/2018     No results found for: PROTEINUA Annually No   Blood pressure BP Readings from Last 1 Encounters:   09/24/20 (!) 148/78    Less than 140/90 Yes   Dilated retinal exam : 05/07/2019 Annually Yes   Foot exam   : 09/24/2020 Annually Yes       CURRENT A1C:    Hemoglobin A1C   Date Value Ref Range Status   05/01/2020 9.9 (H) 4.0 - 5.6 % Final     Comment:     ADA Screening Guidelines:  5.7-6.4%  Consistent with prediabetes  >or=6.5%  Consistent with diabetes  High levels of fetal hemoglobin interfere with the HbA1C  assay. Heterozygous hemoglobin variants (HbS, HgC, etc)do  not significantly interfere with this assay.   However, presence of multiple variants may affect accuracy.     11/04/2019 8.7 (H) 4.0 - 5.6 % Final     Comment:     ADA Screening Guidelines:  5.7-6.4%  Consistent with prediabetes  >or=6.5%  Consistent with diabetes  High levels of fetal hemoglobin interfere with the HbA1C  assay. Heterozygous hemoglobin variants (HbS, HgC, etc)do  not significantly interfere with this assay.   However, presence of multiple variants may affect accuracy.     11/14/2018 6.7 (H) 4.0 - 5.6 % Final     Comment:     ADA Screening Guidelines:  5.7-6.4%  Consistent with prediabetes  >or=6.5%  Consistent with diabetes  High levels of fetal hemoglobin interfere with the HbA1C  assay. Heterozygous hemoglobin variants (HbS, HgC, etc)do  not significantly interfere with this assay.   However, presence of multiple variants may affect accuracy.         GOAL A1C: <8% closer to 7.5%     DM MEDICATIONS USED IN THE PAST: Metformin   Lantus   Novolog   Jardiance       CURRENT DIABETES MEDICATIONS: Metformin 1000 mg BID and Lantus 25 units BID   Suppose to be on Jardiance?   Insulin:  pens.    Missed doses: yes - missing the Lantus at least a couple times per week. - cost is an issue   Denies missed doses of Metformin        BLOOD GLUCOSE MONITORING:  Checking 2 times per day.   No logs or meter   Per oral recall   280-320         HYPOGLYCEMIA:  Denies         MEALS: eating 3 meals per day + snacks   BF: 9 AM- eggs and sausage   Lunch: 3 PM- fast food or skips - doesn't feel like cooking - fried chicken, roast beef   Dinner: 9 PM hamburger or pork chops - red gravy and mac   Snack: honey buns, pancakes at 3 AM,   Drinks: sweet tea and water   Coffee-   Gatorade        CURRENT EXERCISE:  No      Review of Systems  Review of Systems   Constitutional: Negative for appetite change and fatigue.   HENT: Negative for trouble swallowing.    Eyes: Negative for visual disturbance.   Respiratory: Negative for shortness of breath.    Cardiovascular: Negative for chest pain.   Gastrointestinal: Negative for nausea.   Endocrine: Negative for polydipsia, polyphagia and polyuria.   Genitourinary:        No Nocturia    Musculoskeletal:        History of amputations   Skin: Negative for wound.   Neurological: Negative for numbness.   Psychiatric/Behavioral: Positive for dysphoric mood.       Physical Exam   Physical Exam  Vitals signs and nursing note reviewed.   Constitutional:       Appearance: He is well-developed.      Comments: Thin framed    HENT:      Head: Normocephalic and atraumatic.      Right Ear: External ear normal.      Left Ear: External ear normal.      Nose: Nose normal.   Neck:      Musculoskeletal: Normal range of motion and neck supple.      Thyroid: No thyromegaly.      Trachea: No tracheal deviation.   Cardiovascular:      Rate and Rhythm: Normal rate and regular rhythm.      Heart sounds: No murmur.   Pulmonary:      Effort: Pulmonary effort is normal. No respiratory distress.      Breath sounds: Normal breath sounds.   Abdominal:      Palpations: Abdomen is soft.      Tenderness: There is no abdominal tenderness.      Hernia: No hernia is present.   Musculoskeletal:      Comments: Bilateral BKAs with prosthesis  Presents  with rolling walker   Skin:     General: Skin is warm and dry.      Capillary Refill: Capillary refill takes less than 2 seconds.      Findings: No rash.      Comments: Injection sites are normal appearing. No lipo hypertropthy or atrophy     Neurological:      Mental Status: He is alert and oriented to person, place, and time.      Cranial Nerves: No cranial nerve deficit.   Psychiatric:         Mood and Affect: Mood is depressed.         Behavior: Behavior normal.         Judgment: Judgment normal.         FOOT EXAMINATION:  Bilateral prosthesis in place  Protective Sensation (w/ 10 gram monofilament):  Right: Absent  Left: Absent    Visual Inspection:  bilateral BKA     Pedal Pulses:   Right: Absent  Left: Absent    Posterior tibialis:   Right:Absent  Left: Absent        Lab Results   Component Value Date    TSH 1.98 07/13/2018           Type 2 diabetes mellitus with hyperglycemia, with long-term current use of insulin  Uncontrolled.   + noncompliance with insulin regimen and dietary indiscretions are hindering overall management  Discussed DM course, progression, and the need for good BG control to prevent or allay long-term complications. Reviewed proper hypoglycemia management. Discussed consistency in BG monitoring to allow for safe titration of insulin.  He would greatly benefit from a personal C GM and the V Go to help with insulin compliance.   Discussed with patient that he needs to be checking blood sugars 4 times per day and on insulin 3 times per day in order to qualify for a personal C GM per his insurance requirements        -- Medication Changes:   Continue metformin 1000 mg b.i.d.  Discontinue Lantus--noncompliance  Start V Go 20 ( weight based) 3 clicks t.i.d. a.c., 2 clicks for overnight snacking if having pancakes or honey buns.  1 click for overnight snacking on lighter snacks-such as yogurt, fruit.   Will discuss Jardiance use with collaborative. Patient is not currently taking the jardiance.    DELFINO Go was sent to [facility]        -- Reviewed goals of therapy are to get the best control we can without hypoglycemia.  -- Reviewed patient's current insulin regimen. Clarified proper insulin dose and timing in relation to meals, etc. Insulin injection sites and proper rotation instructed.    -- Advised frequent self blood glucose monitoring.  Patient encouraged to document glucose results and bring them to every clinic visit.  Strongly encouraged patient to start checking blood sugars 4 times per day.  Logs provided.  Discussed if he is checking 4 times per day he can qualify for a personal C GM   -- Hypoglycemia precautions discussed. Instructed on precautions before driving.    -- Call for Bg repeatedly < 90 or > 180.   -- Close adherence to lifestyle changes recommended.   -- Periodic follow ups for eye evaluations, foot care and dental care suggested.  -- Refer to diabetes education- VGo start     -- referral to Optometry for eye exam    Microalbuminuria due to type 2 diabetes mellitus  Optimize BG readings.   See above.   On ACEi- reports compliance  Repeat urine mac today    S/P BKA (below knee amputation) bilateral  Avoid hypoglycemia    PAD (peripheral artery disease)  Optimize blood sugar readings    Neuropathy  Optimize BG readings.   See above.   On Cymbalta  Bilateral BKA       Hyperlipidemia  On statin per ADA recommendations  LDL goal < 100. LDL at goal. LFTs WNL. Continue statin.         Hx of heart artery stent  Optimize BG readings.   See above.       Coronary artery disease  Optimize BG readings.   See above.       Essential hypertension  BP goal is < 140/90.   Tolerating ACEi   Blood pressure goals discussed with patient  Blood pressure above goal in clinic today  If above goal with follow-up will consider increasing lisinopril    Moderate episode of recurrent major depressive disorder  Hindering diabetes management  On Cymbalta  Will place referral to psychology    Tobacco  abuse  Encouraged smoking cessation  Offered referral to smoking cessation program and he deferred        Spent 60 minutes with patient with >50% time spent in counseling on medication options, insulin compliance, personal CGM.         ADDENDUM- spoke with Dr. Valadez. Ok to restart Jardiance. Will start 10 mg daily x 1 month and then increase to 25 mg daily after I repeat BMP.        Follow up in about 5 weeks (around 10/29/2020).  Urine MAC, A1c, and BMP today   DM education in 2 weeks for a VGo start   F/u with me 2 weeks after VGo start   Please schedule eye exam       Orders Placed This Encounter   Procedures    Hemoglobin A1C     Standing Status:   Future     Number of Occurrences:   1     Standing Expiration Date:   3/24/2022    Basic metabolic panel     Standing Status:   Future     Number of Occurrences:   1     Standing Expiration Date:   3/24/2022    HIV 1/2 Ag/Ab (4th Gen)     Standing Status:   Future     Number of Occurrences:   1     Standing Expiration Date:   11/23/2021    Ambulatory referral/consult to Optometry     Standing Status:   Future     Standing Expiration Date:   10/24/2021     Referral Priority:   Routine     Referral Type:   Vision (Optometry)     Referral Reason:   Specialty Services Required     Requested Specialty:   Optometry     Number of Visits Requested:   1    Ambulatory referral/consult to Diabetes Education     Standing Status:   Future     Standing Expiration Date:   9/24/2021     Referral Priority:   Routine     Referral Type:   Consultation     Referral Reason:   Specialty Services Required     Requested Specialty:   Diabetes     Number of Visits Requested:   1    Ambulatory referral/consult to Psychology     Standing Status:   Future     Standing Expiration Date:   10/24/2021     Referral Priority:   Routine     Referral Type:   Psychiatric     Referral Reason:   Specialty Services Required     Requested Specialty:   Psychology     Number of Visits Requested:   1        Recommendations were discussed with the patient in detail  The patient verbalized understanding and agrees with the plan outlined as above.     This note was partly generated with [employer] voice recognition software. I apologize for any possible typographical errors.

## 2020-09-24 ENCOUNTER — TELEPHONE (OUTPATIENT)
Dept: PSYCHOLOGY | Facility: CLINIC | Age: 65
End: 2020-09-24

## 2020-09-24 ENCOUNTER — OFFICE VISIT (OUTPATIENT)
Dept: DIABETES | Facility: CLINIC | Age: 65
End: 2020-09-24
Payer: MEDICARE

## 2020-09-24 VITALS
BODY MASS INDEX: 21.7 KG/M2 | WEIGHT: 155 LBS | OXYGEN SATURATION: 95 % | HEIGHT: 71 IN | SYSTOLIC BLOOD PRESSURE: 148 MMHG | DIASTOLIC BLOOD PRESSURE: 78 MMHG | HEART RATE: 77 BPM

## 2020-09-24 DIAGNOSIS — Z71.9 HEALTH EDUCATION/COUNSELING: ICD-10-CM

## 2020-09-24 DIAGNOSIS — Z59.9 FINANCIAL DIFFICULTIES: ICD-10-CM

## 2020-09-24 DIAGNOSIS — Z79.4 TYPE 2 DIABETES MELLITUS WITH HYPERGLYCEMIA, WITH LONG-TERM CURRENT USE OF INSULIN: Primary | ICD-10-CM

## 2020-09-24 DIAGNOSIS — I10 ESSENTIAL HYPERTENSION: ICD-10-CM

## 2020-09-24 DIAGNOSIS — I25.10 CORONARY ARTERY DISEASE INVOLVING NATIVE CORONARY ARTERY OF NATIVE HEART WITHOUT ANGINA PECTORIS: ICD-10-CM

## 2020-09-24 DIAGNOSIS — E78.2 MIXED HYPERLIPIDEMIA: ICD-10-CM

## 2020-09-24 DIAGNOSIS — Z91.199 NONCOMPLIANCE WITH DIABETES TREATMENT: ICD-10-CM

## 2020-09-24 DIAGNOSIS — Z89.511 S/P BKA (BELOW KNEE AMPUTATION) BILATERAL: ICD-10-CM

## 2020-09-24 DIAGNOSIS — Z91.148 NONCOMPLIANCE W/MEDICATION TREATMENT DUE TO INTERMIT USE OF MEDICATION: ICD-10-CM

## 2020-09-24 DIAGNOSIS — G62.9 NEUROPATHY: ICD-10-CM

## 2020-09-24 DIAGNOSIS — Z72.0 TOBACCO ABUSE: ICD-10-CM

## 2020-09-24 DIAGNOSIS — E11.29 MICROALBUMINURIA DUE TO TYPE 2 DIABETES MELLITUS: ICD-10-CM

## 2020-09-24 DIAGNOSIS — Z89.512 S/P BKA (BELOW KNEE AMPUTATION) BILATERAL: ICD-10-CM

## 2020-09-24 DIAGNOSIS — Z11.4 ENCOUNTER FOR SCREENING FOR HIV: ICD-10-CM

## 2020-09-24 DIAGNOSIS — R80.9 MICROALBUMINURIA DUE TO TYPE 2 DIABETES MELLITUS: ICD-10-CM

## 2020-09-24 DIAGNOSIS — Z95.5 HX OF HEART ARTERY STENT: ICD-10-CM

## 2020-09-24 DIAGNOSIS — F33.1 MODERATE EPISODE OF RECURRENT MAJOR DEPRESSIVE DISORDER: ICD-10-CM

## 2020-09-24 DIAGNOSIS — E11.65 TYPE 2 DIABETES MELLITUS WITH HYPERGLYCEMIA, WITH LONG-TERM CURRENT USE OF INSULIN: Primary | ICD-10-CM

## 2020-09-24 DIAGNOSIS — I73.9 PAD (PERIPHERAL ARTERY DISEASE): ICD-10-CM

## 2020-09-24 PROCEDURE — 3077F SYST BP >= 140 MM HG: CPT | Mod: CPTII,S$GLB,, | Performed by: NURSE PRACTITIONER

## 2020-09-24 PROCEDURE — 3046F PR MOST RECENT HEMOGLOBIN A1C LEVEL > 9.0%: ICD-10-PCS | Mod: CPTII,S$GLB,, | Performed by: NURSE PRACTITIONER

## 2020-09-24 PROCEDURE — 3008F PR BODY MASS INDEX (BMI) DOCUMENTED: ICD-10-PCS | Mod: CPTII,S$GLB,, | Performed by: NURSE PRACTITIONER

## 2020-09-24 PROCEDURE — 3078F DIAST BP <80 MM HG: CPT | Mod: CPTII,S$GLB,, | Performed by: NURSE PRACTITIONER

## 2020-09-24 PROCEDURE — 3046F HEMOGLOBIN A1C LEVEL >9.0%: CPT | Mod: CPTII,S$GLB,, | Performed by: NURSE PRACTITIONER

## 2020-09-24 PROCEDURE — 99215 PR OFFICE/OUTPT VISIT, EST, LEVL V, 40-54 MIN: ICD-10-PCS | Mod: S$GLB,,, | Performed by: NURSE PRACTITIONER

## 2020-09-24 PROCEDURE — 99999 PR PBB SHADOW E&M-EST. PATIENT-LVL V: CPT | Mod: PBBFAC,,, | Performed by: NURSE PRACTITIONER

## 2020-09-24 PROCEDURE — 3078F PR MOST RECENT DIASTOLIC BLOOD PRESSURE < 80 MM HG: ICD-10-PCS | Mod: CPTII,S$GLB,, | Performed by: NURSE PRACTITIONER

## 2020-09-24 PROCEDURE — 99999 PR PBB SHADOW E&M-EST. PATIENT-LVL V: ICD-10-PCS | Mod: PBBFAC,,, | Performed by: NURSE PRACTITIONER

## 2020-09-24 PROCEDURE — 99215 OFFICE O/P EST HI 40 MIN: CPT | Mod: S$GLB,,, | Performed by: NURSE PRACTITIONER

## 2020-09-24 PROCEDURE — 3008F BODY MASS INDEX DOCD: CPT | Mod: CPTII,S$GLB,, | Performed by: NURSE PRACTITIONER

## 2020-09-24 PROCEDURE — 3077F PR MOST RECENT SYSTOLIC BLOOD PRESSURE >= 140 MM HG: ICD-10-PCS | Mod: CPTII,S$GLB,, | Performed by: NURSE PRACTITIONER

## 2020-09-24 RX ORDER — SUB-Q INSULIN DEVICE, 40 UNIT
1 EACH MISCELLANEOUS DAILY
Qty: 30 DEVICE | Refills: 6 | Status: SHIPPED | OUTPATIENT
Start: 2020-09-24 | End: 2020-10-22 | Stop reason: SDUPTHER

## 2020-09-24 RX ORDER — INSULIN ASPART 100 [IU]/ML
INJECTION, SOLUTION INTRAVENOUS; SUBCUTANEOUS
Qty: 20 ML | Refills: 6 | Status: SHIPPED | OUTPATIENT
Start: 2020-09-24 | End: 2020-10-22 | Stop reason: SDUPTHER

## 2020-09-24 RX ORDER — SUB-Q INSULIN DEVICE, 40 UNIT
1 EACH MISCELLANEOUS DAILY
Qty: 30 DEVICE | Refills: 6 | Status: SHIPPED | OUTPATIENT
Start: 2020-09-24 | End: 2020-09-24 | Stop reason: SDUPTHER

## 2020-09-24 RX ORDER — EMPAGLIFLOZIN 10 MG/1
10 TABLET, FILM COATED ORAL DAILY
Qty: 30 TABLET | Refills: 1 | Status: SHIPPED | OUTPATIENT
Start: 2020-09-24 | End: 2021-10-11 | Stop reason: SDUPTHER

## 2020-09-24 RX ORDER — EMPAGLIFLOZIN 10 MG/1
10 TABLET, FILM COATED ORAL DAILY
Qty: 30 TABLET | Refills: 1 | Status: SHIPPED | OUTPATIENT
Start: 2020-09-24 | End: 2020-09-24

## 2020-09-24 RX ORDER — INSULIN ASPART 100 [IU]/ML
INJECTION, SOLUTION INTRAVENOUS; SUBCUTANEOUS
Qty: 20 ML | Refills: 6 | Status: SHIPPED | OUTPATIENT
Start: 2020-09-24 | End: 2020-09-24 | Stop reason: SDUPTHER

## 2020-09-24 SDOH — SOCIAL DETERMINANTS OF HEALTH (SDOH): PROBLEM RELATED TO HOUSING AND ECONOMIC CIRCUMSTANCES, UNSPECIFIED: Z59.9

## 2020-09-24 NOTE — TELEPHONE ENCOUNTER
----- Message from Supriya Rowell NP sent at 9/24/2020 11:17 AM CDT -----  Referral to psychology please

## 2020-09-24 NOTE — PATIENT INSTRUCTIONS
We are going to start you on the VGo20--- 3 clicks with meals, 2 clicks snacking with overnight snacking   I have sent the prescription for the VGo devices and the insulin (Novolog ) to the [facility] in [place]. They will ship you the VGo devices and the Insulin to your home. They will be calling you to set up payment and delivery. You will need to give them the coupon card information if applicable.   The [facility] in [place] contact information is : [phone]    You will meet with steve with diabetes education to start your VGo   You will need to bring your VGo devices and 1 vial of insulin ( Humalog or Novolog) to your education appointment.     If you are taking basal insulin ( long acting insulin):  If you take it at night- take 1/2 of your usual dose the night before your education appointment.   If you take it in the morning- HOLD your AM dose before your education appointment.     If you are taking meal time insulin- it is ok to take your meal time insulin IF YOU EAT before coming to your education appointment.       Once you start the VGO you will stop the LANTUS   You will continue the Metformin 1000 mg 2 times per day   I am going to check on the Jardiance for you and get back to you.

## 2020-09-24 NOTE — TELEPHONE ENCOUNTER
Spoke with patient and staff informed the patient that someone from our other facility will be calling him to schedule him with an appointment. Patient verbalized understanding, phone call disconnected. 9/24

## 2020-09-24 NOTE — Clinical Note
Good morning,     I met Mr. Mcpherson for the first time today. I wanted to run the Jardiance by you. Are you ok with me restarting the Jardiance. It sounds like he may have only taken it for 1 month, he doesn't know why he stopped it. This was back in 2019.    I generally avoid SGLT2 in patients with amputations... but he has bilateral BKAs.   Given his extensive cardiovascular hx, he would benefit from the Jardiance.   I feel the benefits may outweigh the risk.     Thanks,   Supriya

## 2020-09-24 NOTE — ASSESSMENT & PLAN NOTE
Uncontrolled.   + noncompliance with insulin regimen and dietary indiscretions are hindering overall management  Discussed DM course, progression, and the need for good BG control to prevent or allay long-term complications. Reviewed proper hypoglycemia management. Discussed consistency in BG monitoring to allow for safe titration of insulin.  He would greatly benefit from a personal C GM and the V Go to help with insulin compliance.   Discussed with patient that he needs to be checking blood sugars 4 times per day and on insulin 3 times per day in order to qualify for a personal C GM per his insurance requirements        -- Medication Changes:   Continue metformin 1000 mg b.i.d.  Discontinue Lantus--noncompliance  Start V Go 20 ( weight based) 3 clicks t.i.d. a.c., 2 clicks for overnight snacking if having pancakes or honey buns.  1 click for overnight snacking on lighter snacks-such as yogurt, fruit.   Will discuss Jardiance use with collaborative. Patient is not currently taking the jardiance.   V Go was sent to [facility]        -- Reviewed goals of therapy are to get the best control we can without hypoglycemia.  -- Reviewed patient's current insulin regimen. Clarified proper insulin dose and timing in relation to meals, etc. Insulin injection sites and proper rotation instructed.    -- Advised frequent self blood glucose monitoring.  Patient encouraged to document glucose results and bring them to every clinic visit.  Strongly encouraged patient to start checking blood sugars 4 times per day.  Logs provided.  Discussed if he is checking 4 times per day he can qualify for a personal C GM   -- Hypoglycemia precautions discussed. Instructed on precautions before driving.    -- Call for Bg repeatedly < 90 or > 180.   -- Close adherence to lifestyle changes recommended.   -- Periodic follow ups for eye evaluations, foot care and dental care suggested.  -- Refer to diabetes education- VGo start     -- referral  to Optometry for eye exam

## 2020-09-24 NOTE — ASSESSMENT & PLAN NOTE
BP goal is < 140/90.   Tolerating ACEi   Blood pressure goals discussed with patient  Blood pressure above goal in clinic today  If above goal with follow-up will consider increasing lisinopril

## 2020-09-30 ENCOUNTER — TELEPHONE (OUTPATIENT)
Dept: DIABETES | Facility: CLINIC | Age: 65
End: 2020-09-30

## 2020-09-30 NOTE — TELEPHONE ENCOUNTER
CHIEF COMPLAINT:   Chief Complaint   Patient presents with   • Consultation        HPI:        Neema is a 64 year old female who was seen at the request of Izzy Rondon, DO  [address] [place] [address] 2015   • Total abdominal hysterectomy  1984    Called pt to discuss lab results-no answer    Please call lab results to patient   A1c is elevated at 11%!  We must work on getting this down.  We would like it under 8% and closer to 7% as we discussed   HIV is negative   On his chemistry panel is blood sugar was 264 and his kidney function was within normal limits   I really would like for patient to start the VGo.   Please encourage patient to  the Vgo and follow up with steve to start it and get his blood sugars down.   Thank you    intact  Psych - appropriate affect, oriented    Physical Exam      Previous Reports:   GI Procedures: see epic  Radiology: see epic  Hematology:   No results found for: WBC, HGB, HCT, PLT, ALC, MCV  Chemistry:   No results found for: NA, K, CO2, BUN, GLUCOSE  LFT's  No results found for: AST, ALT  Coags:  Lab Results   Component Value Date    PTT 26.5 08/04/2014      Other:   No results found for: FERRITIN, IRON, TIBC, AMYLASE, LIPASE     Radiology and labs were reviewed by me independently and discussed with the patient.     ASSESSMENT:  64 year old female with a history of abnormal CT scan and abdominal pains. I will schedule her for an EGD and colonoscopy with small bowel biopsies; can consider CT/MR enterography.    PLAN:  I discussed with the patient the risks and benefits of esophagogastroduodenoscopy, including but not limited to bleeding, infection, adverse reaction to medications, perforation, and missed lesion.  Alternatives such as x-rays, CT scans, and upper GI series were also discussed.  Patient wishes to proceed with esophagogastroduodenoscopy.    I discussed with the patient the risks and benefits of colonoscopy, including but not limited to bleeding, infection, adverse reaction to medications, perforation, and missed lesion.  I discussed that colonoscopy is a very good test, but not 100% guarantee of finding all lesions.  Alternatives such as no screening, flexible sigmoidoscopy with FOBT, CT colonography were also discussed.  Patient wishes to proceed with colonoscopy.          Yoly Felton MD

## 2020-10-08 ENCOUNTER — NUTRITION (OUTPATIENT)
Dept: DIABETES | Facility: CLINIC | Age: 65
End: 2020-10-08
Payer: MEDICARE

## 2020-10-08 VITALS — WEIGHT: 155 LBS | HEIGHT: 71 IN | BODY MASS INDEX: 21.7 KG/M2

## 2020-10-08 DIAGNOSIS — Z79.4 TYPE 2 DIABETES MELLITUS WITH HYPERGLYCEMIA, WITH LONG-TERM CURRENT USE OF INSULIN: ICD-10-CM

## 2020-10-08 DIAGNOSIS — E11.65 TYPE 2 DIABETES MELLITUS WITH HYPERGLYCEMIA, WITH LONG-TERM CURRENT USE OF INSULIN: ICD-10-CM

## 2020-10-08 PROCEDURE — G0108 PR DIAB MANAGE TRN  PER INDIV: ICD-10-PCS | Mod: S$GLB,,, | Performed by: DIETITIAN, REGISTERED

## 2020-10-08 PROCEDURE — 99999 PR PBB SHADOW E&M-EST. PATIENT-LVL IV: ICD-10-PCS | Mod: PBBFAC,,, | Performed by: DIETITIAN, REGISTERED

## 2020-10-08 PROCEDURE — 99999 PR PBB SHADOW E&M-EST. PATIENT-LVL IV: CPT | Mod: PBBFAC,,, | Performed by: DIETITIAN, REGISTERED

## 2020-10-08 PROCEDURE — G0108 DIAB MANAGE TRN  PER INDIV: HCPCS | Mod: S$GLB,,, | Performed by: DIETITIAN, REGISTERED

## 2020-10-08 RX ORDER — BLOOD-GLUCOSE METER
EACH MISCELLANEOUS
COMMUNITY
Start: 2020-08-21 | End: 2021-10-08

## 2020-10-08 RX ORDER — GLUCOSAM/CHON-MSM1/C/MANG/BOSW 500-416.6
TABLET ORAL
COMMUNITY
Start: 2020-08-21 | End: 2021-04-12

## 2020-10-08 NOTE — PROGRESS NOTES
"Diabetes Education  Author: Tracie Armas RD, CDE  Date: 10/8/2020    Diabetes Care Management Summary  Diabetes Education Record Assessment/Progress: Comprehensive/Group  Current Diabetes Risk Level: High     Last A1c:   Lab Results   Component Value Date    HGBA1C 11.0 (H) 09/24/2020     Last Visit with Diabetes Educator: Last Education Visit: Not Found  Last OPCM Referral: : Not Found   Enrolled in OPCM: No      Diabetes Type  Diabetes Type : Type II    Diabetes History  Diabetes Diagnosis: 3-5 years  Current Treatment: Insulin, Oral Medication(jardiance, metformin, novolog in VGo20)  Reviewed Problem List with Patient: Yes    Health Maintenance was reviewed today with patient. Discussed with patient importance of routine eye exams, foot exams/foot care, blood work (i.e.: A1c, microalbumin, and lipid), dental visits, yearly flu vaccine, and pneumonia vaccine as indicated by PCP. Patient verbalized understanding.     Health Maintenance Topics with due status: Not Due       Topic Last Completion Date    TETANUS VACCINE 11/14/2018    Colorectal Cancer Screening 12/13/2018    PROSTATE-SPECIFIC ANTIGEN 11/04/2019    Lipid Panel 11/04/2019    High Dose Statin 09/24/2020    Hemoglobin A1c 09/24/2020     Health Maintenance Due   Topic Date Due    Shingles Vaccine (1 of 2) 10/29/2007    Eye Exam  05/07/2020    Influenza Vaccine (1) 08/01/2020       Nutrition  Meal Planning: drinks regular soda, skipping meals, eats out seldom, snacks between meal  What type of sweetener do you use?: sugar  What type of beverages do you drink?: regular soda/tea(sweet tea)  Meal Plan 24 Hour Recall - Breakfast: "anything I want" sometimes eggs and sausage  Meal Plan 24 Hour Recall - Lunch: skipped  Meal Plan 24 Hour Recall - Dinner: whatever I feel like, might be something I cook like last night homemade thick cut fries or something out  Meal Plan 24 Hour Recall - Snack: Sweet Tea, fruit, cakes, pies    Monitoring   Monitoring: " Other  Self Monitoring : four times a day if his glucometer is working - lately been wasting up to 6 strips a day - glucometer reading err each time, tested in office and is 256  Blood Glucose Logs: No  Do you use a personal continuous glucose monitor?: No  In the last month, how often have you had a low blood sugar reaction?: never  What are your symptoms of low blood sugar?: no symptoms  How do you treat low blood sugar?: drink a coke  Can you tell when your blood sugar is too high?: no  How do you treat high blood sugar?: takes medication    Exercise   Exercise Type: none  Frequency: Never    Current Diabetes Treatment   Current Treatment: Insulin, Oral Medication(jardiance, metformin, novolog in VGo20)    Social History  Preferred Learning Method: Face to Face  Primary Support: Self  Educational Level: High School  Occupation: disabled  Smoking Status: Current Smoker  Alcohol Use: Never                                Barriers to Change  Barriers to Change: None  Learning Challenges : None    Readiness to Learn   Readiness to Learn : Acceptance    Cultural Influences  Cultural Influences: No    Diabetes Education Assessment/Progress  Diabetes Disease Process (diabetes disease process and treatment options): Comprehends Key Points, Discussion, Individual Session  Nutrition (Incorporating nutritional management into one's lifestyle): Comprehends Key Points, Discussion, Individual Session  Physical Activity (incorporating physical activity into one's lifestyle): Comprehends Key Points, Discussion, Individual Session  Medications (states correct name, dose, onset, peak, duration, side effects & timing of meds): Comprehends Key Points, Discussion, Instructed, Individual Session, Written Materials Provided, Demonstration, Demonstrates Understanding/Competency(verbalizes/demonstrates), Needs Review, Return Demonstration  Monitoring (monitoring blood glucose/other parameters & using results): Discussion, Individual  Session, Comprehends Key Points, Demonstration, Instructed  Acute Complications (preventing, detecting, and treating acute complications): Discussion, Individual Session, Comprehends Key Points  Chronic Complications (preventing, detecting, and treating chronic complications): Discussion, Individual Session, Comprehends Key Points  Clinical (diabetes, other pertinent medical history, and relevant comorbidities reviewed during visit): Discussion, Individual Session, Comprehends Key Points  Cognitive (knowledge of self-management skills, functional health literacy): Discussion, Individual Session, Comprehends Key Points  Psychosocial (emotional response to diabetes): Discussion, Individual Session, Comprehends Key Points  Diabetes Distress and Support Systems: Discussion, Individual Session, Comprehends Key Points  Behavioral (readiness for change, lifestyle practices, self-care behaviors): Discussion, Individual Session, Comprehends Key Points  Vgo Disposable Insulin Pump Education  Discussed Vgo20 disposable insulin pump. Patient educated on insulin pump therapy, the purpose of insulin pump therapy, advantages and disadvantages of insulin pump therapy vs multiple daily injections, explained the basal rate is 0.83 - patient will receive a little bit of insulin throughout 24 hours for a total of 20 units based on which VGo is used, bolus dose are delivered delivered by the double clicks - each double click administers 2 units of insulin, explained that patient will be doing 3 sets of double clicks (6 units) before each meal and 2 double clicks (4 units) before a snack, discussed when to change Vgo, demonstrated how to fill VGo with insulin, how to apply vgo and insert the needle, explained the double clicks and what each feature is on the vgo, explained and demonstrated how to safely retract the needle and remove the vgo after 24 hours, discussed ease of usage, carbohydrate counting, demonstrated applying device,  inserting needle, administering bolus insulin, retracting needle, and removing/disposing of vgo. Patient states understanding and performed return demonstration. Patient started first vgo in office. Patient provided with written literature and CDE contact information   Reviewed how to use patient's glucometer - patient keeps getting error messages, also has the strips loose in the glucometer case instead of in the strip container. Discussed possible damage to strips and proper storage - patient states understanding     Goals  Patient has selected/evaluated goals during today's session: Yes, evaluated  Monitoring: % Met  Met Percentage : 75%         Diabetes Care Plan/Intervention  Education Plan/Intervention: Individual Follow-Up DSMT    Diabetes Meal Plan  Restrictions: Low Fat, Low Sodium, Restricted Carbohydrate  Calories: 1800  Carbohydrate Per Meal: 20-30g  Carbohydrate Per Snack : 7-15g  Fat: 50  Protein: 135    Today's Self-Management Care Plan was developed with the patient's input and is based on barriers identified during today's assessment.    The long and short-term goals in the care plan were written with the patient/caregiver's input. The patient has agreed to work toward these goals to improve his overall diabetes control.      The patient received a copy of today's self-management plan and verbalized understanding of the care plan, goals, and all of today's instructions.      The patient was encouraged to communicate with his physician and care team regarding his condition(s) and treatment.  I provided the patient with my contact information today and encouraged him to contact me via phone or patient portal as needed.     Education Units of Time   Time Spent: 60 min

## 2020-10-22 ENCOUNTER — OFFICE VISIT (OUTPATIENT)
Dept: DIABETES | Facility: CLINIC | Age: 65
End: 2020-10-22
Payer: MEDICARE

## 2020-10-22 VITALS
SYSTOLIC BLOOD PRESSURE: 136 MMHG | OXYGEN SATURATION: 96 % | WEIGHT: 153 LBS | DIASTOLIC BLOOD PRESSURE: 60 MMHG | HEIGHT: 71 IN | BODY MASS INDEX: 21.42 KG/M2 | HEART RATE: 77 BPM

## 2020-10-22 DIAGNOSIS — I73.9 PAD (PERIPHERAL ARTERY DISEASE): ICD-10-CM

## 2020-10-22 DIAGNOSIS — Z71.9 HEALTH EDUCATION/COUNSELING: ICD-10-CM

## 2020-10-22 DIAGNOSIS — Z95.5 HX OF HEART ARTERY STENT: ICD-10-CM

## 2020-10-22 DIAGNOSIS — Z91.199 NONCOMPLIANCE WITH DIABETES TREATMENT: ICD-10-CM

## 2020-10-22 DIAGNOSIS — F33.1 MODERATE EPISODE OF RECURRENT MAJOR DEPRESSIVE DISORDER: ICD-10-CM

## 2020-10-22 DIAGNOSIS — I25.10 CORONARY ARTERY DISEASE INVOLVING NATIVE CORONARY ARTERY OF NATIVE HEART WITHOUT ANGINA PECTORIS: ICD-10-CM

## 2020-10-22 DIAGNOSIS — I10 ESSENTIAL HYPERTENSION: ICD-10-CM

## 2020-10-22 DIAGNOSIS — Z79.4 TYPE 2 DIABETES MELLITUS WITH HYPERGLYCEMIA, WITH LONG-TERM CURRENT USE OF INSULIN: Primary | ICD-10-CM

## 2020-10-22 DIAGNOSIS — Z72.0 TOBACCO ABUSE: ICD-10-CM

## 2020-10-22 DIAGNOSIS — Z89.512 S/P BKA (BELOW KNEE AMPUTATION) BILATERAL: ICD-10-CM

## 2020-10-22 DIAGNOSIS — R80.9 MICROALBUMINURIA DUE TO TYPE 2 DIABETES MELLITUS: ICD-10-CM

## 2020-10-22 DIAGNOSIS — G62.9 NEUROPATHY: ICD-10-CM

## 2020-10-22 DIAGNOSIS — Z89.511 S/P BKA (BELOW KNEE AMPUTATION) BILATERAL: ICD-10-CM

## 2020-10-22 DIAGNOSIS — E11.65 TYPE 2 DIABETES MELLITUS WITH HYPERGLYCEMIA, WITH LONG-TERM CURRENT USE OF INSULIN: Primary | ICD-10-CM

## 2020-10-22 DIAGNOSIS — E78.2 MIXED HYPERLIPIDEMIA: ICD-10-CM

## 2020-10-22 DIAGNOSIS — E11.29 MICROALBUMINURIA DUE TO TYPE 2 DIABETES MELLITUS: ICD-10-CM

## 2020-10-22 PROCEDURE — 3075F PR MOST RECENT SYSTOLIC BLOOD PRESS GE 130-139MM HG: ICD-10-PCS | Mod: CPTII,S$GLB,, | Performed by: NURSE PRACTITIONER

## 2020-10-22 PROCEDURE — 3046F HEMOGLOBIN A1C LEVEL >9.0%: CPT | Mod: CPTII,S$GLB,, | Performed by: NURSE PRACTITIONER

## 2020-10-22 PROCEDURE — 3008F PR BODY MASS INDEX (BMI) DOCUMENTED: ICD-10-PCS | Mod: CPTII,S$GLB,, | Performed by: NURSE PRACTITIONER

## 2020-10-22 PROCEDURE — 3078F DIAST BP <80 MM HG: CPT | Mod: CPTII,S$GLB,, | Performed by: NURSE PRACTITIONER

## 2020-10-22 PROCEDURE — 99214 PR OFFICE/OUTPT VISIT, EST, LEVL IV, 30-39 MIN: ICD-10-PCS | Mod: S$GLB,,, | Performed by: NURSE PRACTITIONER

## 2020-10-22 PROCEDURE — 99999 PR PBB SHADOW E&M-EST. PATIENT-LVL V: CPT | Mod: PBBFAC,,, | Performed by: NURSE PRACTITIONER

## 2020-10-22 PROCEDURE — 99999 PR PBB SHADOW E&M-EST. PATIENT-LVL V: ICD-10-PCS | Mod: PBBFAC,,, | Performed by: NURSE PRACTITIONER

## 2020-10-22 PROCEDURE — 3075F SYST BP GE 130 - 139MM HG: CPT | Mod: CPTII,S$GLB,, | Performed by: NURSE PRACTITIONER

## 2020-10-22 PROCEDURE — 3046F PR MOST RECENT HEMOGLOBIN A1C LEVEL > 9.0%: ICD-10-PCS | Mod: CPTII,S$GLB,, | Performed by: NURSE PRACTITIONER

## 2020-10-22 PROCEDURE — 3078F PR MOST RECENT DIASTOLIC BLOOD PRESSURE < 80 MM HG: ICD-10-PCS | Mod: CPTII,S$GLB,, | Performed by: NURSE PRACTITIONER

## 2020-10-22 PROCEDURE — 99214 OFFICE O/P EST MOD 30 MIN: CPT | Mod: S$GLB,,, | Performed by: NURSE PRACTITIONER

## 2020-10-22 PROCEDURE — 3008F BODY MASS INDEX DOCD: CPT | Mod: CPTII,S$GLB,, | Performed by: NURSE PRACTITIONER

## 2020-10-22 RX ORDER — SUB-Q INSULIN DEVICE, 40 UNIT
1 EACH MISCELLANEOUS DAILY
Qty: 30 DEVICE | Refills: 6 | Status: SHIPPED | OUTPATIENT
Start: 2020-10-22 | End: 2021-10-08

## 2020-10-22 RX ORDER — INSULIN ASPART 100 [IU]/ML
INJECTION, SOLUTION INTRAVENOUS; SUBCUTANEOUS
Qty: 20 ML | Refills: 6 | Status: SHIPPED | OUTPATIENT
Start: 2020-10-22 | End: 2021-10-08

## 2020-10-22 NOTE — PROGRESS NOTES
"    CC:   Chief Complaint   Patient presents with    Diabetes Mellitus       HPI: Jordan Mcpherson is a 64 y.o. male presents for a follow up visit today for the management of T2DM.     He was diagnosed with Type 2 diabetes before [place] on routine lab work and started on Metformin. He started in insulin 2005.      Following our visit at the end of September 2020-we stopped MDI as he was noncompliance with multiple daily injections of insulin and started him on the V Go after he met with diabetes education earlier this month.  He reports that he was liking the V Go but stopped using the VGo about 4 days ago because he didn't know how much insulin he was "putting in his body" and it was making him uncomfortable.   His BG readings were MUCH better controlled on the VGo reportedly. BG readings down to 114 and 118.  He plans to go back on the VGo moving forward   Off the VGo he went back to his insulin pens but he didn't use his pens today.   He is going to go back home and put his VGo on.    After discussing his case with Dr. Morley we started him on Jardiance--he is still on the 10 mg daily and due for repeat BMP today.    He still suffers with dietary indiscretions.  Commonly snacking overnight on pancakes and honey buns.  He does not like to cook because he lives alone.  He is still drinking sugary beverages such as sweet tea and regular Gatorade--but reports that he has cut back.  He he has met with diabetes education in the past to review diabetic diet-remains noncompliant.     He presents today with a rolling walker, bilateral BKAs with prosthesis in place.    Family hx of diabetes: mother, aunts, uncles, cousins  Hospitalized for diabetes: denies     No personal or FH of thyroid cancer or personal of pancreatic cancer or pancreatitis.     DIABETES COMPLICATIONS: nephropathy, peripheral neuropathy, cardiovascular disease, cerebrovascular disease, peripheral vascular disease and bilateral BKA    4 MI- 5 " stents placed.   7 CVA     PAD with bilateral stent placement to legs.     Diabetes Management Status    ASA:  Yes - 81 mg daily and plavix     Statin: Taking  ACE/ARB: Taking    Screening or Prevention Patient's value Goal Complete/Controlled?   HgA1C Testing and Control   Lab Results   Component Value Date    HGBA1C 11.0 (H) 09/24/2020      Annually/Less than 8% No   Lipid profile : 11/04/2019 Annually Yes   LDL control Lab Results   Component Value Date    LDLCALC 27 11/04/2019    Annually/Less than 100 mg/dl  Yes   Nephropathy screening Lab Results   Component Value Date    LABMICR 1428.0 09/24/2020     No results found for: PROTEINUA Annually No   Blood pressure BP Readings from Last 1 Encounters:   10/22/20 136/60    Less than 140/90 Yes   Dilated retinal exam : 05/07/2019 Annually Yes   Foot exam   : 09/24/2020 Annually Yes       CURRENT A1C:    Hemoglobin A1C   Date Value Ref Range Status   09/24/2020 11.0 (H) 4.0 - 5.6 % Final     Comment:     ADA Screening Guidelines:  5.7-6.4%  Consistent with prediabetes  >or=6.5%  Consistent with diabetes  High levels of fetal hemoglobin interfere with the HbA1C  assay. Heterozygous hemoglobin variants (HbS, HgC, etc)do  not significantly interfere with this assay.   However, presence of multiple variants may affect accuracy.     05/01/2020 9.9 (H) 4.0 - 5.6 % Final     Comment:     ADA Screening Guidelines:  5.7-6.4%  Consistent with prediabetes  >or=6.5%  Consistent with diabetes  High levels of fetal hemoglobin interfere with the HbA1C  assay. Heterozygous hemoglobin variants (HbS, HgC, etc)do  not significantly interfere with this assay.   However, presence of multiple variants may affect accuracy.     11/04/2019 8.7 (H) 4.0 - 5.6 % Final     Comment:     ADA Screening Guidelines:  5.7-6.4%  Consistent with prediabetes  >or=6.5%  Consistent with diabetes  High levels of fetal hemoglobin interfere with the HbA1C  assay. Heterozygous hemoglobin variants (HbS, HgC,  etc)do  not significantly interfere with this assay.   However, presence of multiple variants may affect accuracy.         GOAL A1C: <8% closer to 7.5%     DM MEDICATIONS USED IN THE PAST: Metformin   Lantus   Novolog   Jardiance   VGo     CURRENT DIABETES MEDICATIONS: Metformin 1000 mg BID  Jardiance 10 mg daily   VGo20 with Novolog insulin - 3 clicks with meals and 3 clicks for snack   Insulin:  pens.    Missed doses: he is missing the insulin   Denies missed doses of Metformin and Jardiance     VGo at [facility] -- he would like to move it to the [facility] [place] as it is cheaper.       BLOOD GLUCOSE MONITORING:  He checks 4 times per day   No logs or meter   Per oral recall   114,118- on the VGo   This AM he was 200 - back on MDI and after his coffee        HYPOGLYCEMIA:  Denies         MEALS: eating 2-3 meals per day + snacks   BF: 9 AM- eggs and sausage OR [employer]onalds   Lunch: 3 PM- fast food or skips - doesn't feel like cooking - fried chicken, roast beef OR Left overs   Dinner: 9 PM hamburger or pork chops - red gravy and mac   Snack: honey buns, pancakes at 3 AM,   Drinks: sweet tea and water   Coffee-   Gatorade        CURRENT EXERCISE:  No      Review of Systems  Review of Systems   Constitutional: Negative for appetite change and fatigue.   HENT: Negative for trouble swallowing.    Eyes: Negative for visual disturbance.   Respiratory: Negative for shortness of breath.    Cardiovascular: Negative for chest pain.   Gastrointestinal: Negative for nausea.   Endocrine: Negative for polydipsia, polyphagia and polyuria.   Genitourinary:        No Nocturia    Musculoskeletal:        History of amputations   Skin: Negative for wound.   Neurological: Negative for numbness.   Psychiatric/Behavioral: Positive for dysphoric mood.        Mood is better today        Physical Exam   Physical Exam  Vitals signs and nursing note reviewed.   Constitutional:       Appearance: He is well-developed.      Comments: Thin framed     HENT:      Head: Normocephalic and atraumatic.      Right Ear: External ear normal.      Left Ear: External ear normal.      Nose: Nose normal.   Neck:      Musculoskeletal: Normal range of motion and neck supple.      Thyroid: No thyromegaly.      Trachea: No tracheal deviation.   Cardiovascular:      Rate and Rhythm: Normal rate and regular rhythm.      Heart sounds: No murmur.   Pulmonary:      Effort: Pulmonary effort is normal. No respiratory distress.      Breath sounds: Normal breath sounds.   Abdominal:      Palpations: Abdomen is soft.      Tenderness: There is no abdominal tenderness.      Hernia: No hernia is present.   Musculoskeletal:      Comments: Bilateral BKAs with prosthesis  Presents with rolling walker   Skin:     General: Skin is warm and dry.      Capillary Refill: Capillary refill takes less than 2 seconds.      Findings: No rash.      Comments: Injection sites and VGo sites are normal appearing. No lipo hypertropthy or atrophy     Neurological:      Mental Status: He is alert and oriented to person, place, and time.      Cranial Nerves: No cranial nerve deficit.   Psychiatric:         Mood and Affect: Affect normal.         Behavior: Behavior normal.         Judgment: Judgment normal.      Comments: In good spirits today         FOOT EXAMINATION:  Bilateral prosthesis in place          Lab Results   Component Value Date    TSH 1.98 07/13/2018           Type 2 diabetes mellitus with hyperglycemia, with long-term current use of insulin  Uncontrolled.   + noncompliance with insulin regimen and dietary indiscretions are hindering overall management  Discussed DM course, progression, and the need for good BG control to prevent or allay long-term complications. Reviewed proper hypoglycemia management. Discussed consistency in BG monitoring to allow for safe titration of insulin.  He would greatly benefit from a personal C GM with the VGo.   He plans to restart the VGo as his BG readings were much  better controlled.  He feels more comfortable restarting the V Go now that he knows how much insulin the device can give him in a 24 hr period.         -- Medication Changes:   Continue metformin 1000 mg b.i.d.  Get back on VGo 20- with Novolog 3 clicks TID AC, 1-2 clicks with snacks PRN.   After he gets his BMP today-will likely increase his Jardiance to 25 mg daily. Awaiting results.         -- Reviewed goals of therapy are to get the best control we can without hypoglycemia.  -- Reviewed patient's current insulin regimen. Clarified proper insulin dose and timing in relation to meals, etc. Insulin injection sites and proper rotation instructed.    -- Advised frequent self blood glucose monitoring.  Patient encouraged to document glucose results and bring them to every clinic visit.  Continue to monitor blood sugars 4 times per day.  Logs provided.  Will submit paperwork for Dexcom.   -- Hypoglycemia precautions discussed. Instructed on precautions before driving.    -- Call for Bg repeatedly < 90 or > 180.   -- Close adherence to lifestyle changes recommended.   -- Periodic follow ups for eye evaluations, foot care and dental care suggested.  -- Refer to diabetes education- Dexcom start     -- referral to Optometry for eye exam placed again today.     Microalbuminuria due to type 2 diabetes mellitus  Optimize BG readings.   See above.   On ACEi- reports compliance  Repeat urine MAC with RTC     S/P BKA (below knee amputation) bilateral  Avoid hypoglycemia    PAD (peripheral artery disease)  Optimize blood sugar readings    Neuropathy  Optimize BG readings.   See above.   On Cymbalta  Bilateral BKA       Hyperlipidemia  On statin per ADA recommendations  LDL goal < 100. LDL at goal. LFTs WNL. Continue statin.   Lipids with RTC       Hx of heart artery stent  Optimize BG readings.   See above.       Coronary artery disease  Optimize BG readings.   See above.       Moderate episode of recurrent major depressive  disorder  May hinder diabetes management  On Cymbalta  In good spirits today.  Referral psychology was placed at last visit    Essential hypertension  BP goal is < 140/90.   Tolerating ACEi  Controlled --repeat manual blood pressure is at goal  Blood pressure goals discussed with patient      Tobacco abuse  Encouraged smoking cessation  Offered referral to smoking cessation program and he deferred      Spent 25 minutes with patient with > 50% time spent in counseling on the VGo and personal CGM         Follow up in about 2 months (around 12/22/2020).  BMP today   F/u with me 2 months with labs prior   Schedule eye exam         Orders Placed This Encounter   Procedures    Basic Metabolic Panel     Standing Status:   Future     Standing Expiration Date:   4/22/2022    Hemoglobin A1C     Standing Status:   Future     Standing Expiration Date:   4/22/2022    Comprehensive Metabolic Panel     Standing Status:   Future     Standing Expiration Date:   4/22/2022    Microalbumin/Creatinine Ratio, Urine     Standing Status:   Future     Standing Expiration Date:   4/22/2022     Order Specific Question:   Specimen Source     Answer:   Urine    Lipid Panel     Standing Status:   Future     Standing Expiration Date:   4/22/2022    Ambulatory referral/consult to Diabetes Education     Standing Status:   Future     Standing Expiration Date:   10/22/2021     Referral Priority:   Routine     Referral Type:   Consultation     Referral Reason:   Specialty Services Required     Referred to Provider:   Tracie Armas RD, CDE     Requested Specialty:   Diabetes     Number of Visits Requested:   1    Ambulatory referral/consult to Optometry     Standing Status:   Future     Standing Expiration Date:   11/22/2021     Referral Priority:   Routine     Referral Type:   Vision (Optometry)     Referral Reason:   Specialty Services Required     Requested Specialty:   Optometry     Number of Visits Requested:   1       Recommendations were  discussed with the patient in detail  The patient verbalized understanding and agrees with the plan outlined as above.     This note was partly generated with kingsky voice recognition software. I apologize for any possible typographical errors.

## 2020-10-22 NOTE — ASSESSMENT & PLAN NOTE
BP goal is < 140/90.   Tolerating ACEi  Controlled --repeat manual blood pressure is at goal  Blood pressure goals discussed with patient

## 2020-10-22 NOTE — ASSESSMENT & PLAN NOTE
May hinder diabetes management  On Cymbalta  In good spirits today.  Referral psychology was placed at last visit

## 2020-10-22 NOTE — ASSESSMENT & PLAN NOTE
Uncontrolled.   + noncompliance with insulin regimen and dietary indiscretions are hindering overall management  Discussed DM course, progression, and the need for good BG control to prevent or allay long-term complications. Reviewed proper hypoglycemia management. Discussed consistency in BG monitoring to allow for safe titration of insulin.  He would greatly benefit from a personal C GM with the VGo.   He plans to restart the VGo as his BG readings were much better controlled.  He feels more comfortable restarting the V Go now that he knows how much insulin the device can give him in a 24 hr period.         -- Medication Changes:   Continue metformin 1000 mg b.i.d.  Get back on VGo 20- with Novolog 3 clicks TID AC, 1-2 clicks with snacks PRN.   After he gets his BMP today-will likely increase his Jardiance to 25 mg daily. Awaiting results.         -- Reviewed goals of therapy are to get the best control we can without hypoglycemia.  -- Reviewed patient's current insulin regimen. Clarified proper insulin dose and timing in relation to meals, etc. Insulin injection sites and proper rotation instructed.    -- Advised frequent self blood glucose monitoring.  Patient encouraged to document glucose results and bring them to every clinic visit.  Continue to monitor blood sugars 4 times per day.  Logs provided.  Will submit paperwork for Dexcom.   -- Hypoglycemia precautions discussed. Instructed on precautions before driving.    -- Call for Bg repeatedly < 90 or > 180.   -- Close adherence to lifestyle changes recommended.   -- Periodic follow ups for eye evaluations, foot care and dental care suggested.  -- Refer to diabetes education- Dexcom start     -- referral to Optometry for eye exam placed again today.

## 2020-10-22 NOTE — ASSESSMENT & PLAN NOTE
On statin per ADA recommendations  LDL goal < 100. LDL at goal. LFTs WNL. Continue statin.   Lipids with RTC

## 2020-11-19 ENCOUNTER — TELEPHONE (OUTPATIENT)
Dept: PRIMARY CARE CLINIC | Facility: CLINIC | Age: 65
End: 2020-11-19

## 2020-11-19 NOTE — TELEPHONE ENCOUNTER
Called patient in regards to fax I received regarding DEXCOM. Patient does not want this at this time.

## 2020-11-20 ENCOUNTER — TELEPHONE (OUTPATIENT)
Dept: PRIMARY CARE CLINIC | Facility: CLINIC | Age: 65
End: 2020-11-20

## 2020-11-20 NOTE — TELEPHONE ENCOUNTER
Attempted to call back Desi, was placed on hold. Pt does not want continuous glucose monitoring system.

## 2020-11-20 NOTE — TELEPHONE ENCOUNTER
----- Message from Serena Bryant sent at 11/20/2020  9:56 AM CST -----  Contact: Shira with [facility] phone [phone] ext [phone]  Shira with [facility] phone [phone] ext [phone], Calling to inquire about the order for a continuous monitoring system. Please call her. Thanks.

## 2021-02-24 ENCOUNTER — TELEPHONE (OUTPATIENT)
Dept: PSYCHOLOGY | Facility: CLINIC | Age: 66
End: 2021-02-24

## 2021-02-26 ENCOUNTER — IMMUNIZATION (OUTPATIENT)
Dept: INTERNAL MEDICINE | Facility: CLINIC | Age: 66
End: 2021-02-26
Payer: MEDICARE

## 2021-02-26 DIAGNOSIS — Z23 NEED FOR VACCINATION: Primary | ICD-10-CM

## 2021-02-26 PROCEDURE — 91300 COVID-19, MRNA, LNP-S, PF, 30 MCG/0.3 ML DOSE VACCINE: CPT | Mod: PBBFAC | Performed by: INTERNAL MEDICINE

## 2021-03-16 LAB
LEFT EYE DM RETINOPATHY: NEGATIVE
RIGHT EYE DM RETINOPATHY: NEGATIVE

## 2021-03-19 ENCOUNTER — IMMUNIZATION (OUTPATIENT)
Dept: INTERNAL MEDICINE | Facility: CLINIC | Age: 66
End: 2021-03-19
Payer: MEDICARE

## 2021-03-19 DIAGNOSIS — Z23 NEED FOR VACCINATION: Primary | ICD-10-CM

## 2021-03-19 PROCEDURE — 0002A COVID-19, MRNA, LNP-S, PF, 30 MCG/0.3 ML DOSE VACCINE: CPT | Mod: PBBFAC | Performed by: INTERNAL MEDICINE

## 2021-03-19 PROCEDURE — 91300 COVID-19, MRNA, LNP-S, PF, 30 MCG/0.3 ML DOSE VACCINE: CPT | Mod: PBBFAC | Performed by: INTERNAL MEDICINE

## 2021-04-27 NOTE — PROGRESS NOTES
"Subjective:       Patient ID: Jordan Mcpherson is a 62 y.o. male.    Chief Complaint: Cough (pt says he has had a cough x 1 month. )    Chest congestion, wheezing, SoB, and cough productive of white sputum for the past ~4 weeks. No known documented history of COPD, but has smoked 1ppd x 45 years. Went to ER 12/20, diagnosed with acute bronchitis, treated with inhalers, prednisone and cough medicine.      Review of Systems   Constitutional: Negative for fever.   HENT: Negative for trouble swallowing.    Eyes: Negative for visual disturbance.   Respiratory: Positive for cough, shortness of breath and wheezing.    Cardiovascular: Negative for chest pain.   Gastrointestinal: Negative for diarrhea, nausea and vomiting.   Genitourinary: Negative for difficulty urinating.   Musculoskeletal: Negative for joint swelling.   Skin: Negative for rash.   Neurological: Negative for seizures and syncope.   Hematological: Does not bruise/bleed easily.   Psychiatric/Behavioral: Negative for agitation and confusion.       Objective:      Vitals:    01/11/18 1441   BP: (!) 142/72   BP Location: Left arm   Patient Position: Sitting   BP Method: Medium (Automatic)   Pulse: 81   Resp: 18   Temp: 98.2 °F (36.8 °C)   TempSrc: Oral   SpO2: 96%   Weight: 77.1 kg (170 lb)   Height: 5' 11" (1.803 m)     Physical Exam    Assessment:       1. Acute bronchitis, unspecified organism    2. Chronic cough    3. Tobacco abuse    4. Acute bronchitis, unspecified organism        Plan:       Acute bronchitis, unspecified organism  Comments:  no acute findings on CXR  Orders:  -     X-Ray Chest PA And Lateral; Future; Expected date: 01/11/2018  -     albuterol (VENTOLIN HFA) 90 mcg/actuation inhaler; Inhale 2 puffs into the lungs every 4 (four) hours as needed for Wheezing or Shortness of Breath.  Dispense: 18 g; Refill: 2  -     doxycycline (VIBRAMYCIN) 100 MG Cap; Take 1 capsule (100 mg total) by mouth every 12 (twelve) hours.  Dispense: 20 capsule; Refill: " 0  -     predniSONE (DELTASONE) 20 MG tablet; 2 tabs daily x 5 days, 1 tab daily x 5 days, then off  Dispense: 15 tablet; Refill: 0    Chronic cough  Comments:  suspect underlying COPD; needs baseline PFT's when better  Orders:  -     Complete PFT w/ bronchodilator; Future; Expected date: 02/22/2018    Tobacco abuse  Comments:  needs to quit  Orders:  -     Complete PFT w/ bronchodilator; Future; Expected date: 02/22/2018    Acute bronchitis, unspecified organism  -     X-Ray Chest PA And Lateral; Future; Expected date: 01/11/2018  -     albuterol (VENTOLIN HFA) 90 mcg/actuation inhaler; Inhale 2 puffs into the lungs every 4 (four) hours as needed for Wheezing or Shortness of Breath.  Dispense: 18 g; Refill: 2  -     doxycycline (VIBRAMYCIN) 100 MG Cap; Take 1 capsule (100 mg total) by mouth every 12 (twelve) hours.  Dispense: 20 capsule; Refill: 0  -     predniSONE (DELTASONE) 20 MG tablet; 2 tabs daily x 5 days, 1 tab daily x 5 days, then off  Dispense: 15 tablet; Refill: 0      Medication List with Changes/Refills   New Medications    DOXYCYCLINE (VIBRAMYCIN) 100 MG CAP    Take 1 capsule (100 mg total) by mouth every 12 (twelve) hours.    PREDNISONE (DELTASONE) 20 MG TABLET    2 tabs daily x 5 days, 1 tab daily x 5 days, then off   Current Medications    ASPIRIN (ECOTRIN) 81 MG EC TABLET    Take 81 mg by mouth once daily.    ATORVASTATIN (LIPITOR) 80 MG TABLET    Take 1 tablet by mouth once daily.    BACLOFEN (LIORESAL) 10 MG TABLET    TAKE 1 TABLET (10 MG TOTAL) BY MOUTH THREE TIMES DAILY WITH FOOD.    CARVEDILOL (COREG) 3.125 MG TABLET    Take 1 tablet by mouth once daily.    CLOPIDOGREL (PLAVIX) 75 MG TABLET    Take 1 tablet by mouth once daily.    DIAZEPAM (VALIUM) 5 MG TABLET    Take 1 tablet by mouth once daily.    FOLIC ACID (FOLVITE) 1 MG TABLET    Take 1 tablet by mouth once daily.    HYDROCODONE-ACETAMINOPHEN 10-325MG (NORCO)  MG TAB    Take 1 tablet by mouth once daily.    INSULIN ASPART (NOVOLOG  FLEXPEN) 100 UNIT/ML INPN PEN    Inject 2 Units into the skin 2 (two) times daily.    INSULIN GLARGINE (LANTUS SOLOSTAR) 100 UNIT/ML (3 ML) INPN PEN    Inject 30 Units into the skin 2 (two) times daily.    LISINOPRIL (PRINIVIL,ZESTRIL) 2.5 MG TABLET    Take 1 tablet by mouth once daily.    METFORMIN (GLUCOPHAGE) 1000 MG TABLET    TAKE ONE TABLET BY MOUTH TWICE DAILY WITH MEALS    NITROGLYCERIN (NITROSTAT) 0.4 MG SL TABLET    Place 1 tablet under the tongue as needed.   Changed and/or Refilled Medications    Modified Medication Previous Medication    ALBUTEROL (VENTOLIN HFA) 90 MCG/ACTUATION INHALER VENTOLIN HFA 90 mcg/actuation inhaler       Inhale 2 puffs into the lungs every 4 (four) hours as needed for Wheezing or Shortness of Breath.    Inhale 2 puffs into the lungs every 4 (four) hours as needed for Wheezing or Shortness of Breath.   Discontinued Medications    ROSUVASTATIN (CRESTOR) 20 MG TABLET    Take 1 tablet by mouth once daily.          Continue Regimen: Continue isotretinoin and if labs AST ALT are still okay and not rising we can increase the dose to isotretinoin. Plan: He will get us the labs and we will see if we can okay the dose for BID. Detail Level: Generalized Render In Strict Bullet Format?: No

## 2021-05-28 DIAGNOSIS — F41.9 ANXIETY: ICD-10-CM

## 2021-05-28 DIAGNOSIS — F33.1 MODERATE EPISODE OF RECURRENT MAJOR DEPRESSIVE DISORDER: ICD-10-CM

## 2021-05-28 RX ORDER — DULOXETIN HYDROCHLORIDE 60 MG/1
60 CAPSULE, DELAYED RELEASE ORAL DAILY
Qty: 90 CAPSULE | Refills: 0 | Status: SHIPPED | OUTPATIENT
Start: 2021-05-28 | End: 2022-01-10

## 2021-08-24 DIAGNOSIS — I10 ESSENTIAL HYPERTENSION: ICD-10-CM

## 2021-08-24 RX ORDER — CARVEDILOL 3.12 MG/1
TABLET ORAL
Qty: 180 TABLET | Refills: 0 | Status: SHIPPED | OUTPATIENT
Start: 2021-08-24 | End: 2021-11-19

## 2021-10-07 ENCOUNTER — TELEPHONE (OUTPATIENT)
Dept: DIABETES | Facility: CLINIC | Age: 66
End: 2021-10-07

## 2021-10-07 ENCOUNTER — PATIENT OUTREACH (OUTPATIENT)
Dept: ADMINISTRATIVE | Facility: OTHER | Age: 66
End: 2021-10-07

## 2021-10-07 DIAGNOSIS — E78.5 TYPE 2 DIABETES MELLITUS WITH HYPERLIPIDEMIA: Primary | ICD-10-CM

## 2021-10-07 DIAGNOSIS — E11.69 TYPE 2 DIABETES MELLITUS WITH HYPERLIPIDEMIA: Primary | ICD-10-CM

## 2021-10-08 ENCOUNTER — IMMUNIZATION (OUTPATIENT)
Dept: PRIMARY CARE CLINIC | Facility: CLINIC | Age: 66
End: 2021-10-08
Payer: MEDICARE

## 2021-10-08 ENCOUNTER — OFFICE VISIT (OUTPATIENT)
Dept: DIABETES | Facility: CLINIC | Age: 66
End: 2021-10-08
Payer: MEDICARE

## 2021-10-08 VITALS
HEART RATE: 94 BPM | TEMPERATURE: 99 F | OXYGEN SATURATION: 95 % | SYSTOLIC BLOOD PRESSURE: 145 MMHG | DIASTOLIC BLOOD PRESSURE: 64 MMHG | BODY MASS INDEX: 21.33 KG/M2 | HEIGHT: 71 IN | WEIGHT: 152.38 LBS

## 2021-10-08 DIAGNOSIS — E78.2 MIXED HYPERLIPIDEMIA: ICD-10-CM

## 2021-10-08 DIAGNOSIS — E11.65 TYPE 2 DIABETES MELLITUS WITH HYPERGLYCEMIA, WITH LONG-TERM CURRENT USE OF INSULIN: Primary | ICD-10-CM

## 2021-10-08 DIAGNOSIS — I10 ESSENTIAL HYPERTENSION: ICD-10-CM

## 2021-10-08 DIAGNOSIS — Z95.5 HX OF HEART ARTERY STENT: ICD-10-CM

## 2021-10-08 DIAGNOSIS — Z79.4 TYPE 2 DIABETES MELLITUS WITH HYPERGLYCEMIA, WITH LONG-TERM CURRENT USE OF INSULIN: Primary | ICD-10-CM

## 2021-10-08 DIAGNOSIS — E11.29 MICROALBUMINURIA DUE TO TYPE 2 DIABETES MELLITUS: ICD-10-CM

## 2021-10-08 DIAGNOSIS — Z72.0 TOBACCO ABUSE: ICD-10-CM

## 2021-10-08 DIAGNOSIS — Z12.5 PROSTATE CANCER SCREENING: ICD-10-CM

## 2021-10-08 DIAGNOSIS — G62.9 NEUROPATHY: ICD-10-CM

## 2021-10-08 DIAGNOSIS — I73.9 PAD (PERIPHERAL ARTERY DISEASE): ICD-10-CM

## 2021-10-08 DIAGNOSIS — Z89.511 S/P BKA (BELOW KNEE AMPUTATION) BILATERAL: ICD-10-CM

## 2021-10-08 DIAGNOSIS — Z23 NEED FOR VACCINATION: Primary | ICD-10-CM

## 2021-10-08 DIAGNOSIS — I25.10 CORONARY ARTERY DISEASE INVOLVING NATIVE CORONARY ARTERY OF NATIVE HEART WITHOUT ANGINA PECTORIS: ICD-10-CM

## 2021-10-08 DIAGNOSIS — R80.9 MICROALBUMINURIA DUE TO TYPE 2 DIABETES MELLITUS: ICD-10-CM

## 2021-10-08 DIAGNOSIS — F33.1 MODERATE EPISODE OF RECURRENT MAJOR DEPRESSIVE DISORDER: ICD-10-CM

## 2021-10-08 DIAGNOSIS — Z89.512 S/P BKA (BELOW KNEE AMPUTATION) BILATERAL: ICD-10-CM

## 2021-10-08 DIAGNOSIS — R09.89 BILATERAL CAROTID BRUITS: ICD-10-CM

## 2021-10-08 PROCEDURE — 91300 COVID-19, MRNA, LNP-S, PF, 30 MCG/0.3 ML DOSE VACCINE: CPT | Mod: PBBFAC | Performed by: FAMILY MEDICINE

## 2021-10-08 PROCEDURE — 4010F ACE/ARB THERAPY RXD/TAKEN: CPT | Mod: CPTII,S$GLB,, | Performed by: NURSE PRACTITIONER

## 2021-10-08 PROCEDURE — 1159F PR MEDICATION LIST DOCUMENTED IN MEDICAL RECORD: ICD-10-PCS | Mod: CPTII,S$GLB,, | Performed by: NURSE PRACTITIONER

## 2021-10-08 PROCEDURE — 3008F PR BODY MASS INDEX (BMI) DOCUMENTED: ICD-10-PCS | Mod: CPTII,S$GLB,, | Performed by: NURSE PRACTITIONER

## 2021-10-08 PROCEDURE — 3008F BODY MASS INDEX DOCD: CPT | Mod: CPTII,S$GLB,, | Performed by: NURSE PRACTITIONER

## 2021-10-08 PROCEDURE — 1160F PR REVIEW ALL MEDS BY PRESCRIBER/CLIN PHARMACIST DOCUMENTED: ICD-10-PCS | Mod: CPTII,S$GLB,, | Performed by: NURSE PRACTITIONER

## 2021-10-08 PROCEDURE — 99215 OFFICE O/P EST HI 40 MIN: CPT | Mod: S$GLB,,, | Performed by: NURSE PRACTITIONER

## 2021-10-08 PROCEDURE — 3077F PR MOST RECENT SYSTOLIC BLOOD PRESSURE >= 140 MM HG: ICD-10-PCS | Mod: CPTII,S$GLB,, | Performed by: NURSE PRACTITIONER

## 2021-10-08 PROCEDURE — 99999 PR PBB SHADOW E&M-EST. PATIENT-LVL IV: ICD-10-PCS | Mod: PBBFAC,,, | Performed by: NURSE PRACTITIONER

## 2021-10-08 PROCEDURE — 4010F PR ACE/ARB THEARPY RXD/TAKEN: ICD-10-PCS | Mod: CPTII,S$GLB,, | Performed by: NURSE PRACTITIONER

## 2021-10-08 PROCEDURE — 3078F PR MOST RECENT DIASTOLIC BLOOD PRESSURE < 80 MM HG: ICD-10-PCS | Mod: CPTII,S$GLB,, | Performed by: NURSE PRACTITIONER

## 2021-10-08 PROCEDURE — 99215 PR OFFICE/OUTPT VISIT, EST, LEVL V, 40-54 MIN: ICD-10-PCS | Mod: S$GLB,,, | Performed by: NURSE PRACTITIONER

## 2021-10-08 PROCEDURE — 1160F RVW MEDS BY RX/DR IN RCRD: CPT | Mod: CPTII,S$GLB,, | Performed by: NURSE PRACTITIONER

## 2021-10-08 PROCEDURE — 3078F DIAST BP <80 MM HG: CPT | Mod: CPTII,S$GLB,, | Performed by: NURSE PRACTITIONER

## 2021-10-08 PROCEDURE — 99999 PR PBB SHADOW E&M-EST. PATIENT-LVL IV: CPT | Mod: PBBFAC,,, | Performed by: NURSE PRACTITIONER

## 2021-10-08 PROCEDURE — 1159F MED LIST DOCD IN RCRD: CPT | Mod: CPTII,S$GLB,, | Performed by: NURSE PRACTITIONER

## 2021-10-08 PROCEDURE — 3077F SYST BP >= 140 MM HG: CPT | Mod: CPTII,S$GLB,, | Performed by: NURSE PRACTITIONER

## 2021-10-08 RX ORDER — INSULIN ASPART 100 [IU]/ML
INJECTION, SOLUTION INTRAVENOUS; SUBCUTANEOUS
Qty: 1 BOX | Refills: 6 | Status: SHIPPED | OUTPATIENT
Start: 2021-10-08 | End: 2023-09-08

## 2021-10-08 RX ORDER — GLUCAGON 3 MG/1
POWDER NASAL
Qty: 2 EACH | Refills: 1 | Status: SHIPPED | OUTPATIENT
Start: 2021-10-08 | End: 2023-09-08

## 2021-10-08 RX ORDER — INSULIN PUMP SYRINGE, 3 ML
EACH MISCELLANEOUS
Qty: 1 EACH | Refills: 0 | Status: SHIPPED | OUTPATIENT
Start: 2021-10-08 | End: 2023-10-16

## 2021-10-08 RX ORDER — INSULIN GLARGINE AND LIXISENATIDE 100; 33 U/ML; UG/ML
24 INJECTION, SOLUTION SUBCUTANEOUS
Qty: 5 PEN | Refills: 6 | Status: SHIPPED | OUTPATIENT
Start: 2021-10-08 | End: 2022-10-08

## 2021-10-08 RX ORDER — PEN NEEDLE, DIABETIC 32GX 5/32"
NEEDLE, DISPOSABLE MISCELLANEOUS
Qty: 150 EACH | Refills: 11 | Status: SHIPPED | OUTPATIENT
Start: 2021-10-08 | End: 2023-10-16

## 2021-10-08 RX ORDER — LANCETS
EACH MISCELLANEOUS
Qty: 150 EACH | Refills: 11 | Status: SHIPPED | OUTPATIENT
Start: 2021-10-08 | End: 2023-10-16

## 2021-10-11 ENCOUNTER — TELEPHONE (OUTPATIENT)
Dept: DIABETES | Facility: CLINIC | Age: 66
End: 2021-10-11

## 2021-10-11 DIAGNOSIS — E11.65 TYPE 2 DIABETES MELLITUS WITH HYPERGLYCEMIA, WITH LONG-TERM CURRENT USE OF INSULIN: Primary | ICD-10-CM

## 2021-10-11 DIAGNOSIS — Z79.4 TYPE 2 DIABETES MELLITUS WITH HYPERGLYCEMIA, WITH LONG-TERM CURRENT USE OF INSULIN: Primary | ICD-10-CM

## 2021-10-11 RX ORDER — EMPAGLIFLOZIN 10 MG/1
10 TABLET, FILM COATED ORAL DAILY
Qty: 30 TABLET | Refills: 4 | Status: SHIPPED | OUTPATIENT
Start: 2021-10-11 | End: 2022-08-05

## 2021-11-19 DIAGNOSIS — I10 ESSENTIAL HYPERTENSION: ICD-10-CM

## 2021-11-19 RX ORDER — CARVEDILOL 3.12 MG/1
TABLET ORAL
Qty: 180 TABLET | Refills: 0 | Status: SHIPPED | OUTPATIENT
Start: 2021-11-19 | End: 2022-05-04

## 2021-11-24 ENCOUNTER — PATIENT OUTREACH (OUTPATIENT)
Dept: ADMINISTRATIVE | Facility: OTHER | Age: 66
End: 2021-11-24
Payer: MEDICAID

## 2022-01-10 DIAGNOSIS — F33.1 MODERATE EPISODE OF RECURRENT MAJOR DEPRESSIVE DISORDER: ICD-10-CM

## 2022-01-10 DIAGNOSIS — F41.9 ANXIETY: ICD-10-CM

## 2022-01-10 RX ORDER — DULOXETIN HYDROCHLORIDE 60 MG/1
60 CAPSULE, DELAYED RELEASE ORAL DAILY
Qty: 90 CAPSULE | Refills: 0 | Status: SHIPPED | OUTPATIENT
Start: 2022-01-10 | End: 2022-05-04

## 2022-01-10 NOTE — TELEPHONE ENCOUNTER
Care Due:                  Date            Visit Type   Department     Provider  --------------------------------------------------------------------------------                                             [facility]  Last Visit: 08-      None         PRIMARY CARE   Mike Morley  Next Visit: None Scheduled  None         None Found                                                            Last  Test          Frequency    Reason                     Performed    Due Date  --------------------------------------------------------------------------------    Office Visit  12 months..  DULoxetine, atorvastatin,   08-   08-                             lisinopriL, metFORMIN,                             nitroGLYCERIN............    HBA1C.......  6 months...  metFORMIN................  10-   04-    Powered by [employer]. Reference number: [id number].   1/10/2022 11:16:21 AM CST

## 2022-05-04 DIAGNOSIS — F41.9 ANXIETY: ICD-10-CM

## 2022-05-04 DIAGNOSIS — F33.1 MODERATE EPISODE OF RECURRENT MAJOR DEPRESSIVE DISORDER: ICD-10-CM

## 2022-05-04 DIAGNOSIS — I10 ESSENTIAL HYPERTENSION: ICD-10-CM

## 2022-05-04 RX ORDER — METFORMIN HYDROCHLORIDE 1000 MG/1
TABLET ORAL
Qty: 180 TABLET | Refills: 0 | Status: SHIPPED | OUTPATIENT
Start: 2022-05-04 | End: 2022-10-19 | Stop reason: SDUPTHER

## 2022-05-04 RX ORDER — ATORVASTATIN CALCIUM 80 MG/1
TABLET, FILM COATED ORAL
Qty: 90 TABLET | Refills: 0 | Status: SHIPPED | OUTPATIENT
Start: 2022-05-04 | End: 2022-08-03 | Stop reason: SDUPTHER

## 2022-05-04 RX ORDER — DULOXETIN HYDROCHLORIDE 60 MG/1
60 CAPSULE, DELAYED RELEASE ORAL DAILY
Qty: 90 CAPSULE | Refills: 0 | Status: SHIPPED | OUTPATIENT
Start: 2022-05-04 | End: 2022-09-06

## 2022-05-04 RX ORDER — LISINOPRIL 5 MG/1
TABLET ORAL
Qty: 90 TABLET | Refills: 0 | Status: SHIPPED | OUTPATIENT
Start: 2022-05-04 | End: 2022-08-03

## 2022-05-04 RX ORDER — CARVEDILOL 3.12 MG/1
TABLET ORAL
Qty: 180 TABLET | Refills: 0 | Status: SHIPPED | OUTPATIENT
Start: 2022-05-04 | End: 2022-08-03

## 2022-05-04 NOTE — TELEPHONE ENCOUNTER
Refill Routing Note   Medication(s) are not appropriate for processing by [facility] for the following reason(s):      - Patient has not been seen in over 15 months by PCP  - Required laboratory values are outdated    [facility] action(s):  Defer Medication-related problems identified:   Requires labs  Requires appointment        Medication reconciliation completed: No     Appointments  past 12m or future 3m with PCP    Date Provider   Last Visit   8/10/2020 Mike Morley MD   Next Visit   Visit date not found Mike Morley MD   ED visits in past 90 days: 0        Note composed:3:38 PM 05/04/2022

## 2022-05-04 NOTE — TELEPHONE ENCOUNTER
Care Due:                  Date            Visit Type   Department     Provider  --------------------------------------------------------------------------------                                 -                              PRIMARY      [facility]  Last Visit: 08-      CARE ([facility])   PRIMARY CARE   Mike Morley  Next Visit: None Scheduled  None         None Found                                                            Last  Test          Frequency    Reason                     Performed    Due Date  --------------------------------------------------------------------------------    Office Visit  12 months..  DULoxetine, atorvastatin,   08-   08-                             lisinopriL, metFORMIN....    HBA1C.......  6 months...  metFORMIN................  10-   04-    [facility] Embedded Care Gaps. Reference number: [id number]. 5/04/2022   9:18:40 AM CDT

## 2022-05-08 PROBLEM — R73.9 HYPERGLYCEMIA: Status: ACTIVE | Noted: 2022-05-08

## 2022-05-09 PROBLEM — R11.2 NON-INTRACTABLE VOMITING WITH NAUSEA: Status: ACTIVE | Noted: 2022-05-09

## 2022-05-10 ENCOUNTER — TELEPHONE (OUTPATIENT)
Dept: PRIMARY CARE CLINIC | Facility: CLINIC | Age: 67
End: 2022-05-10
Payer: MEDICARE

## 2022-05-10 NOTE — TELEPHONE ENCOUNTER
----- Message from Serena Bryant sent at 5/10/2022  8:38 AM CDT -----  Contact: Patient, [phone]  Patient is returning a phone call.  Who left a message for the patient: Jennifer  Does patient know what this is regarding:  ?  Would you like a call back, or a response through your [facility] portal?:   Call back  Comments:  Missed your call, please call him back. Thanks.

## 2022-05-16 ENCOUNTER — OFFICE VISIT (OUTPATIENT)
Dept: PRIMARY CARE CLINIC | Facility: CLINIC | Age: 67
End: 2022-05-16
Payer: MEDICARE

## 2022-05-16 VITALS
HEART RATE: 85 BPM | OXYGEN SATURATION: 96 % | TEMPERATURE: 98 F | SYSTOLIC BLOOD PRESSURE: 138 MMHG | RESPIRATION RATE: 16 BRPM | WEIGHT: 147.19 LBS | HEIGHT: 72 IN | DIASTOLIC BLOOD PRESSURE: 64 MMHG | BODY MASS INDEX: 19.93 KG/M2

## 2022-05-16 DIAGNOSIS — N40.1 BENIGN PROSTATIC HYPERPLASIA WITH URINARY HESITANCY: ICD-10-CM

## 2022-05-16 DIAGNOSIS — I73.9 PAD (PERIPHERAL ARTERY DISEASE): ICD-10-CM

## 2022-05-16 DIAGNOSIS — R35.0 URINARY FREQUENCY: ICD-10-CM

## 2022-05-16 DIAGNOSIS — I10 ESSENTIAL HYPERTENSION: Primary | ICD-10-CM

## 2022-05-16 DIAGNOSIS — K21.9 GASTROESOPHAGEAL REFLUX DISEASE WITHOUT ESOPHAGITIS: ICD-10-CM

## 2022-05-16 DIAGNOSIS — Z79.4 TYPE 2 DIABETES MELLITUS WITH HYPERGLYCEMIA, WITH LONG-TERM CURRENT USE OF INSULIN: ICD-10-CM

## 2022-05-16 DIAGNOSIS — E11.29 MICROALBUMINURIA DUE TO TYPE 2 DIABETES MELLITUS: ICD-10-CM

## 2022-05-16 DIAGNOSIS — R39.11 BENIGN PROSTATIC HYPERPLASIA WITH URINARY HESITANCY: ICD-10-CM

## 2022-05-16 DIAGNOSIS — Z89.511 S/P BKA (BELOW KNEE AMPUTATION) BILATERAL: ICD-10-CM

## 2022-05-16 DIAGNOSIS — E11.65 TYPE 2 DIABETES MELLITUS WITH HYPERGLYCEMIA, WITH LONG-TERM CURRENT USE OF INSULIN: ICD-10-CM

## 2022-05-16 DIAGNOSIS — R80.9 MICROALBUMINURIA DUE TO TYPE 2 DIABETES MELLITUS: ICD-10-CM

## 2022-05-16 DIAGNOSIS — Z91.199 MEDICAL NON-COMPLIANCE: ICD-10-CM

## 2022-05-16 DIAGNOSIS — Z89.512 S/P BKA (BELOW KNEE AMPUTATION) BILATERAL: ICD-10-CM

## 2022-05-16 PROCEDURE — 3075F PR MOST RECENT SYSTOLIC BLOOD PRESS GE 130-139MM HG: ICD-10-PCS | Mod: CPTII,S$GLB,, | Performed by: NURSE PRACTITIONER

## 2022-05-16 PROCEDURE — 99214 PR OFFICE/OUTPT VISIT, EST, LEVL IV, 30-39 MIN: ICD-10-PCS | Mod: S$GLB,,, | Performed by: NURSE PRACTITIONER

## 2022-05-16 PROCEDURE — 3008F PR BODY MASS INDEX (BMI) DOCUMENTED: ICD-10-PCS | Mod: CPTII,S$GLB,, | Performed by: NURSE PRACTITIONER

## 2022-05-16 PROCEDURE — 3046F HEMOGLOBIN A1C LEVEL >9.0%: CPT | Mod: CPTII,S$GLB,, | Performed by: NURSE PRACTITIONER

## 2022-05-16 PROCEDURE — 99999 PR PBB SHADOW E&M-EST. PATIENT-LVL V: CPT | Mod: PBBFAC,,, | Performed by: NURSE PRACTITIONER

## 2022-05-16 PROCEDURE — 3078F PR MOST RECENT DIASTOLIC BLOOD PRESSURE < 80 MM HG: ICD-10-PCS | Mod: CPTII,S$GLB,, | Performed by: NURSE PRACTITIONER

## 2022-05-16 PROCEDURE — 1160F RVW MEDS BY RX/DR IN RCRD: CPT | Mod: CPTII,S$GLB,, | Performed by: NURSE PRACTITIONER

## 2022-05-16 PROCEDURE — 3078F DIAST BP <80 MM HG: CPT | Mod: CPTII,S$GLB,, | Performed by: NURSE PRACTITIONER

## 2022-05-16 PROCEDURE — 1111F DSCHRG MED/CURRENT MED MERGE: CPT | Mod: CPTII,S$GLB,, | Performed by: NURSE PRACTITIONER

## 2022-05-16 PROCEDURE — 1159F PR MEDICATION LIST DOCUMENTED IN MEDICAL RECORD: ICD-10-PCS | Mod: CPTII,S$GLB,, | Performed by: NURSE PRACTITIONER

## 2022-05-16 PROCEDURE — 3008F BODY MASS INDEX DOCD: CPT | Mod: CPTII,S$GLB,, | Performed by: NURSE PRACTITIONER

## 2022-05-16 PROCEDURE — 3075F SYST BP GE 130 - 139MM HG: CPT | Mod: CPTII,S$GLB,, | Performed by: NURSE PRACTITIONER

## 2022-05-16 PROCEDURE — 99214 OFFICE O/P EST MOD 30 MIN: CPT | Mod: S$GLB,,, | Performed by: NURSE PRACTITIONER

## 2022-05-16 PROCEDURE — 4010F PR ACE/ARB THEARPY RXD/TAKEN: ICD-10-PCS | Mod: CPTII,S$GLB,, | Performed by: NURSE PRACTITIONER

## 2022-05-16 PROCEDURE — 99999 PR PBB SHADOW E&M-EST. PATIENT-LVL V: ICD-10-PCS | Mod: PBBFAC,,, | Performed by: NURSE PRACTITIONER

## 2022-05-16 PROCEDURE — 99215 OFFICE O/P EST HI 40 MIN: CPT | Mod: PBBFAC,PN | Performed by: NURSE PRACTITIONER

## 2022-05-16 PROCEDURE — 1111F PR DISCHARGE MEDS RECONCILED W/ CURRENT OUTPATIENT MED LIST: ICD-10-PCS | Mod: CPTII,S$GLB,, | Performed by: NURSE PRACTITIONER

## 2022-05-16 PROCEDURE — 1160F PR REVIEW ALL MEDS BY PRESCRIBER/CLIN PHARMACIST DOCUMENTED: ICD-10-PCS | Mod: CPTII,S$GLB,, | Performed by: NURSE PRACTITIONER

## 2022-05-16 PROCEDURE — 3046F PR MOST RECENT HEMOGLOBIN A1C LEVEL > 9.0%: ICD-10-PCS | Mod: CPTII,S$GLB,, | Performed by: NURSE PRACTITIONER

## 2022-05-16 PROCEDURE — 4010F ACE/ARB THERAPY RXD/TAKEN: CPT | Mod: CPTII,S$GLB,, | Performed by: NURSE PRACTITIONER

## 2022-05-16 PROCEDURE — 1159F MED LIST DOCD IN RCRD: CPT | Mod: CPTII,S$GLB,, | Performed by: NURSE PRACTITIONER

## 2022-05-16 RX ORDER — TAMSULOSIN HYDROCHLORIDE 0.4 MG/1
0.4 CAPSULE ORAL DAILY
Qty: 30 CAPSULE | Refills: 11 | Status: SHIPPED | OUTPATIENT
Start: 2022-05-16 | End: 2022-09-15 | Stop reason: SDUPTHER

## 2022-05-16 NOTE — PROGRESS NOTES
"Chief Complaint  Chief Complaint   Patient presents with    Hospital Follow Up    Hyperglycemia       HPI    Patient to the ER for dizziness and fatigue and admitted for [facility]. Kept over night and discharged home. Continues to be non-complaint with his insulin and his jardiance, metformin daily. Refusing to take his insulin. Refusing to check his sugars. Was previously referred to CAMERON Rowell but not interested in seeing her again. Overall mood is good. No anxiety or depression. Patient states that "he only plans to take his insulin when he is feeling badly". He understands that this is going to ultimately kill him. States that it is no different from his stroke or heart attacks. Complaining of difficulty urinating. Burning pain with urinating and weak stream. Dribbling. Urinating frequently. Patient without a h/o prostate issues in the past. PSA in October of last year was normal.         PAST MEDICAL HISTORY:  Past Medical History:   Diagnosis Date    Allergy     Arthritis     Coronary artery disease     Diabetes mellitus, type 2     Hyperlipidemia     Hypertension     Stroke        PAST SURGICAL HISTORY:  Past Surgical History:   Procedure Laterality Date    CAROTID ENDARTERECTOMY      COLONOSCOPY N/A 12/13/2018    Procedure: COLONOSCOPY no thinners just ASA  ;  Surgeon: Tomás Grimm MD;  Location: [facility] ENDO;  Service: General;  Laterality: N/A;    COLONOSCOPY W/ POLYPECTOMY  12/13/2018    CORONARY ANGIOPLASTY WITH STENT PLACEMENT  05/04/2020    per  stent x2 and ballon angioplasty     CORONARY ANGIOPLASTY WITH STENT PLACEMENT Right 5/4/2020    Procedure: Angioplasty, Coronary Artery, With Stent Insertion;  Surgeon: Jesus Ricardo MD;  Location: [facility] CATH LAB;  Service: Cardiology;  Laterality: Right;    CORONARY STENT PLACEMENT      LEFT HEART CATHETERIZATION N/A 5/4/2020    Procedure: Left heart cath;  Surgeon: Jesus Ricardo MD;  Location: [facility] CATH LAB;  Service: Cardiology;  " "Laterality: N/A;    LEG AMPUTATION      STOMACH SURGERY      WRIST SURGERY         SOCIAL HISTORY:  Social History     Socioeconomic History    Marital status:    Tobacco Use    Smoking status: Current Every Day Smoker     Packs/day: 2.00     Types: Cigarettes    Smokeless tobacco: Never Used   Substance and Sexual Activity    Alcohol use: No    Drug use: No    Sexual activity: Never       FAMILY HISTORY:  Family History   Family history unknown: Yes       ALLERGIES AND MEDICATIONS: updated and reviewed.  Review of patient's allergies indicates:  No Known Allergies  Current Outpatient Medications   Medication Sig Dispense Refill    albuterol (PROVENTIL/VENTOLIN HFA) 90 mcg/actuation inhaler Inhale 1-2 puffs into the lungs every 6 (six) hours as needed for Shortness of Breath. Rescue 17 g 0    aspirin (ECOTRIN) 81 MG EC tablet Take 81 mg by mouth once daily.      atorvastatin (LIPITOR) 80 MG tablet TAKE ONE TABLET BY MOUTH EACH EVENING FOR CHOLESTEROL 90 tablet 0    baclofen (LIORESAL) 20 MG tablet Take 20 mg by mouth 5 (five) times daily. Takes 5 times daily. Total of 120 mg  1    BD BETTINA 2ND GEN PEN NEEDLE 32 gauge x 5/32" Ndle To use with insulin injections 4 times per day 150 each 11    blood sugar diagnostic Strp To check BG 4 times daily, to use with insurance preferred meter 150 each 11    blood-glucose meter kit To check BG 4 times daily, to use with insurance preferred meter 1 each 0    carvediloL (COREG) 3.125 MG tablet TAKE ONE TABLET BY MOUTH TWICE DAILY 180 tablet 0    clopidogreL (PLAVIX) 75 mg tablet TAKE ONE TABLET BY MOUTH ONCE DAILY 90 tablet 1    DULoxetine (CYMBALTA) 60 MG capsule TAKE 1 CAPSULE (60 MG TOTAL) BY MOUTH ONCE DAILY. 90 capsule 0    empagliflozin (JARDIANCE) 10 mg tablet Take 1 tablet (10 mg total) by mouth once daily. 30 tablet 4    hydrocodone-acetaminophen 10-325mg (NORCO)  mg Tab Take 1 tablet by mouth every 6 (six) hours as needed.   0    " insulin aspart U-100 (NOVOLOG FLEXPEN U-100 INSULIN) 100 unit/mL (3 mL) InPn pen Inject 6-8 units 3 times per day with food + correction scale goal 150, +2. Max  TDD of 50 units 1 Box 6    insulin glargine-lixisenatide (SOLIQUA 100/33) 100 unit-33 mcg/mL InPn pen Inject 24 Units into the skin daily with breakfast. 5 pen 6    lancets Misc To check BG 4 times daily, to use with insurance preferred meter 150 each 11    lisinopriL (PRINIVIL,ZESTRIL) 5 MG tablet TAKE ONE TABLET BY MOUTH DAILY 90 tablet 0    metFORMIN (GLUCOPHAGE) 1000 MG tablet TAKE ONE TABLET BY MOUTH TWICE DAILY WITH MEALS 180 tablet 0    nitroGLYCERIN (NITROSTAT) 0.4 MG SL tablet Place 1 tablet (0.4 mg total) under the tongue every 5 (five) minutes as needed for Chest pain. 25 tablet 2    pantoprazole (PROTONIX) 40 MG tablet Take 1 tablet (40 mg total) by mouth once daily. 90 tablet 3    folic acid (FOLVITE) 1 MG tablet TAKE ONE TABLET BY MOUTH DAILY (Patient not taking: Reported on 5/16/2022) 30 tablet 0    glucagon (BAQSIMI) 3 mg/actuation Spry 3 mg (one actuation) into a single nostril; if no response, may repeat in 15 minutes using a new intranasal device. (Patient not taking: Reported on 5/16/2022) 2 each 1    tamsulosin (FLOMAX) 0.4 mg Cap Take 1 capsule (0.4 mg total) by mouth once daily. 30 capsule 11     No current facility-administered medications for this visit.         ROS  Review of Systems   Constitutional: Positive for fatigue. Negative for chills and fever.   HENT: Negative for ear pain, postnasal drip and sinus pain.    Respiratory: Negative for cough and shortness of breath.    Cardiovascular: Negative for chest pain.   Gastrointestinal: Negative for diarrhea, nausea and vomiting.   Genitourinary: Positive for decreased urine volume, difficulty urinating, frequency and urgency. Negative for flank pain and penile pain.   Musculoskeletal: Positive for arthralgias and gait problem (aris BKA).   Psychiatric/Behavioral: Positive  "for agitation, dysphoric mood and sleep disturbance. The patient is not nervous/anxious.            PHYSICAL EXAM  Vitals:    05/16/22 1026   BP: 138/64   BP Location: Right arm   Patient Position: Sitting   BP Method: Medium (Manual)   Pulse: 85   Resp: 16   Temp: 98.3 °F (36.8 °C)   TempSrc: Oral   SpO2: 96%   Weight: 66.7 kg (147 lb 2.5 oz)   Height: 6' (1.829 m)    Body mass index is 19.96 kg/m².  Weight: 66.7 kg (147 lb 2.5 oz)   Height: 6' (182.9 cm)     Physical Exam  Musculoskeletal:        Legs:       Right Lower Extremity: Right leg is amputated below knee.      Left Lower Extremity: Left leg is amputated below knee.           Health Maintenance       Date Due Completion Date    Eye Exam 05/07/2020 5/7/2019    Diabetes Urine Screening 09/24/2021 9/24/2020    COVID-19 Vaccine (4 - Booster for Pfizer series) 02/08/2022 10/8/2021    Shingles Vaccine (2 of 2) 03/08/2022 1/11/2022    Hemoglobin A1c 08/08/2022 5/8/2022    PROSTATE-SPECIFIC ANTIGEN 10/08/2022 10/8/2021    Lipid Panel 10/08/2022 10/8/2021    High Dose Statin 05/04/2023 5/4/2022    Colorectal Cancer Screening 12/13/2023 12/13/2018    TETANUS VACCINE 11/14/2028 11/14/2018            Assessment & Plan    Problem List Items Addressed This Visit        Unprioritized    DM (diabetes mellitus), type 2, uncontrolled  Patient refusing to see endocrinology. Refusing lab work. Will not take his insulin. Extensive discussion on effects of poor glycemic control on vision, kidneys, heart, brain. Patient states that there is "no way I will change his mind". Okay with taking PO medications only.       Essential hypertension - Primary  The current medical regimen is effective;  continue present plan and medications.      Gastroesophageal reflux disease without esophagitis    Medical non-compliance  Continues with reluctance in taking his insulin or checking his sugars. Not interested in CGM, has tried VGO in the past with no success.       Microalbuminuria due to " "type 2 diabetes mellitus      PAD (peripheral artery disease)  No interest in smoking cessation.       S/P BKA (below knee amputation) bilateral      Other Visit Diagnoses     Urinary frequency        Relevant Orders    POCT URINE DIPSTICK WITHOUT MICROSCOPE       Benign prostatic hyperplasia with urinary hesitancy                  Trial of flomax. Patient refusing COLTEN at this time.          Follow-up: Follow up in about 3 months (around 8/16/2022).    Julieta Rosa    Medication List with Changes/Refills   New Medications    TAMSULOSIN (FLOMAX) 0.4 MG CAP    Take 1 capsule (0.4 mg total) by mouth once daily.   Current Medications    ALBUTEROL (PROVENTIL/VENTOLIN HFA) 90 MCG/ACTUATION INHALER    Inhale 1-2 puffs into the lungs every 6 (six) hours as needed for Shortness of Breath. Rescue    ASPIRIN (ECOTRIN) 81 MG EC TABLET    Take 81 mg by mouth once daily.    ATORVASTATIN (LIPITOR) 80 MG TABLET    TAKE ONE TABLET BY MOUTH EACH EVENING FOR CHOLESTEROL    BACLOFEN (LIORESAL) 20 MG TABLET    Take 20 mg by mouth 5 (five) times daily. Takes 5 times daily. Total of 120 mg    BD BETTINA 2ND GEN PEN NEEDLE 32 GAUGE X 5/32" NDLE    To use with insulin injections 4 times per day    BLOOD SUGAR DIAGNOSTIC STRP    To check BG 4 times daily, to use with insurance preferred meter    BLOOD-GLUCOSE METER KIT    To check BG 4 times daily, to use with insurance preferred meter    CARVEDILOL (COREG) 3.125 MG TABLET    TAKE ONE TABLET BY MOUTH TWICE DAILY    CLOPIDOGREL (PLAVIX) 75 MG TABLET    TAKE ONE TABLET BY MOUTH ONCE DAILY    DULOXETINE (CYMBALTA) 60 MG CAPSULE    TAKE 1 CAPSULE (60 MG TOTAL) BY MOUTH ONCE DAILY.    EMPAGLIFLOZIN (JARDIANCE) 10 MG TABLET    Take 1 tablet (10 mg total) by mouth once daily.    FOLIC ACID (FOLVITE) 1 MG TABLET    TAKE ONE TABLET BY MOUTH DAILY    GLUCAGON (BAQSIMI) 3 MG/ACTUATION SPRY    3 mg (one actuation) into a single nostril; if no response, may repeat in 15 minutes using a new intranasal " device.    HYDROCODONE-ACETAMINOPHEN 10-325MG (NORCO)  MG TAB    Take 1 tablet by mouth every 6 (six) hours as needed.     INSULIN ASPART U-100 (NOVOLOG FLEXPEN U-100 INSULIN) 100 UNIT/ML (3 ML) INPN PEN    Inject 6-8 units 3 times per day with food + correction scale goal 150, +2. Max  TDD of 50 units    INSULIN GLARGINE-LIXISENATIDE (SOLIQUA 100/33) 100 UNIT-33 MCG/ML INPN PEN    Inject 24 Units into the skin daily with breakfast.    LANCETS MISC    To check BG 4 times daily, to use with insurance preferred meter    LISINOPRIL (PRINIVIL,ZESTRIL) 5 MG TABLET    TAKE ONE TABLET BY MOUTH DAILY    METFORMIN (GLUCOPHAGE) 1000 MG TABLET    TAKE ONE TABLET BY MOUTH TWICE DAILY WITH MEALS    NITROGLYCERIN (NITROSTAT) 0.4 MG SL TABLET    Place 1 tablet (0.4 mg total) under the tongue every 5 (five) minutes as needed for Chest pain.    PANTOPRAZOLE (PROTONIX) 40 MG TABLET    Take 1 tablet (40 mg total) by mouth once daily.

## 2022-08-03 PROBLEM — R09.89 BILATERAL CAROTID BRUITS: Status: ACTIVE | Noted: 2022-08-03

## 2022-08-03 PROBLEM — I65.21 ASYMPTOMATIC STENOSIS OF RIGHT CAROTID ARTERY: Status: ACTIVE | Noted: 2022-08-03

## 2022-08-22 PROBLEM — Z86.73 HISTORY OF STROKE: Status: ACTIVE | Noted: 2022-08-22

## 2022-09-09 ENCOUNTER — PATIENT OUTREACH (OUTPATIENT)
Dept: ADMINISTRATIVE | Facility: HOSPITAL | Age: 67
End: 2022-09-09
Payer: MEDICARE

## 2022-09-09 DIAGNOSIS — E11.69 TYPE 2 DIABETES MELLITUS WITH HYPERLIPIDEMIA: Primary | ICD-10-CM

## 2022-09-09 DIAGNOSIS — E78.5 TYPE 2 DIABETES MELLITUS WITH HYPERLIPIDEMIA: Primary | ICD-10-CM

## 2022-09-09 NOTE — PROGRESS NOTES
Health Maintenance Due   Topic Date Due    Eye Exam  05/07/2020    Diabetes Urine Screening  09/24/2021    COVID-19 Vaccine (4 - Booster for Pfizer series) 02/08/2022    Shingles Vaccine (2 of 2) 03/08/2022    Hemoglobin A1c  08/08/2022    Influenza Vaccine (1) 09/01/2022     Chart review done. HM updated. Immunizations reviewed & updated. Care Everywhere updated.

## 2022-09-15 ENCOUNTER — OFFICE VISIT (OUTPATIENT)
Dept: PRIMARY CARE CLINIC | Facility: CLINIC | Age: 67
End: 2022-09-15
Payer: MEDICARE

## 2022-09-15 VITALS
TEMPERATURE: 97 F | SYSTOLIC BLOOD PRESSURE: 124 MMHG | OXYGEN SATURATION: 98 % | BODY MASS INDEX: 19.77 KG/M2 | HEART RATE: 72 BPM | WEIGHT: 145.94 LBS | HEIGHT: 72 IN | RESPIRATION RATE: 18 BRPM | DIASTOLIC BLOOD PRESSURE: 62 MMHG

## 2022-09-15 DIAGNOSIS — F33.1 MODERATE EPISODE OF RECURRENT MAJOR DEPRESSIVE DISORDER: ICD-10-CM

## 2022-09-15 DIAGNOSIS — R39.12 WEAK URINARY STREAM: Primary | ICD-10-CM

## 2022-09-15 DIAGNOSIS — N40.0 BENIGN PROSTATIC HYPERPLASIA, UNSPECIFIED WHETHER LOWER URINARY TRACT SYMPTOMS PRESENT: ICD-10-CM

## 2022-09-15 DIAGNOSIS — I10 ESSENTIAL HYPERTENSION: ICD-10-CM

## 2022-09-15 DIAGNOSIS — E11.69 TYPE 2 DIABETES MELLITUS WITH HYPERLIPIDEMIA: ICD-10-CM

## 2022-09-15 DIAGNOSIS — J41.1 MUCOPURULENT CHRONIC BRONCHITIS: ICD-10-CM

## 2022-09-15 DIAGNOSIS — Z72.0 TOBACCO ABUSE: ICD-10-CM

## 2022-09-15 DIAGNOSIS — E78.5 TYPE 2 DIABETES MELLITUS WITH HYPERLIPIDEMIA: ICD-10-CM

## 2022-09-15 LAB
BILIRUB SERPL-MCNC: ABNORMAL MG/DL
BLOOD URINE, POC: ABNORMAL
CLARITY, POC UA: CLEAR
COLOR, POC UA: YELLOW
GLUCOSE UR QL STRIP: >500
KETONES UR QL STRIP: ABNORMAL
LEUKOCYTE ESTERASE URINE, POC: ABNORMAL
NITRITE, POC UA: ABNORMAL
PH, POC UA: 5
PROTEIN, POC: ABNORMAL
SPECIFIC GRAVITY, POC UA: 1
UROBILINOGEN, POC UA: ABNORMAL

## 2022-09-15 PROCEDURE — 99999 PR PBB SHADOW E&M-EST. PATIENT-LVL V: ICD-10-PCS | Mod: PBBFAC,,, | Performed by: NURSE PRACTITIONER

## 2022-09-15 PROCEDURE — 3078F DIAST BP <80 MM HG: CPT | Mod: CPTII,S$GLB,, | Performed by: NURSE PRACTITIONER

## 2022-09-15 PROCEDURE — 4010F ACE/ARB THERAPY RXD/TAKEN: CPT | Mod: CPTII,S$GLB,, | Performed by: NURSE PRACTITIONER

## 2022-09-15 PROCEDURE — 3074F PR MOST RECENT SYSTOLIC BLOOD PRESSURE < 130 MM HG: ICD-10-PCS | Mod: CPTII,S$GLB,, | Performed by: NURSE PRACTITIONER

## 2022-09-15 PROCEDURE — 3008F BODY MASS INDEX DOCD: CPT | Mod: CPTII,S$GLB,, | Performed by: NURSE PRACTITIONER

## 2022-09-15 PROCEDURE — 3008F PR BODY MASS INDEX (BMI) DOCUMENTED: ICD-10-PCS | Mod: CPTII,S$GLB,, | Performed by: NURSE PRACTITIONER

## 2022-09-15 PROCEDURE — 81002 POCT URINE DIPSTICK WITHOUT MICROSCOPE: ICD-10-PCS | Mod: S$GLB,,, | Performed by: NURSE PRACTITIONER

## 2022-09-15 PROCEDURE — 99499 UNLISTED E&M SERVICE: CPT | Mod: HCNC,S$GLB,, | Performed by: NURSE PRACTITIONER

## 2022-09-15 PROCEDURE — 3074F SYST BP LT 130 MM HG: CPT | Mod: CPTII,S$GLB,, | Performed by: NURSE PRACTITIONER

## 2022-09-15 PROCEDURE — 3046F PR MOST RECENT HEMOGLOBIN A1C LEVEL > 9.0%: ICD-10-PCS | Mod: CPTII,S$GLB,, | Performed by: NURSE PRACTITIONER

## 2022-09-15 PROCEDURE — 3046F HEMOGLOBIN A1C LEVEL >9.0%: CPT | Mod: CPTII,S$GLB,, | Performed by: NURSE PRACTITIONER

## 2022-09-15 PROCEDURE — 4010F PR ACE/ARB THEARPY RXD/TAKEN: ICD-10-PCS | Mod: CPTII,S$GLB,, | Performed by: NURSE PRACTITIONER

## 2022-09-15 PROCEDURE — 81002 URINALYSIS NONAUTO W/O SCOPE: CPT | Mod: S$GLB,,, | Performed by: NURSE PRACTITIONER

## 2022-09-15 PROCEDURE — 99999 PR PBB SHADOW E&M-EST. PATIENT-LVL V: CPT | Mod: PBBFAC,,, | Performed by: NURSE PRACTITIONER

## 2022-09-15 PROCEDURE — 1159F MED LIST DOCD IN RCRD: CPT | Mod: CPTII,S$GLB,, | Performed by: NURSE PRACTITIONER

## 2022-09-15 PROCEDURE — 99214 PR OFFICE/OUTPT VISIT, EST, LEVL IV, 30-39 MIN: ICD-10-PCS | Mod: S$GLB,,, | Performed by: NURSE PRACTITIONER

## 2022-09-15 PROCEDURE — 1159F PR MEDICATION LIST DOCUMENTED IN MEDICAL RECORD: ICD-10-PCS | Mod: CPTII,S$GLB,, | Performed by: NURSE PRACTITIONER

## 2022-09-15 PROCEDURE — 3078F PR MOST RECENT DIASTOLIC BLOOD PRESSURE < 80 MM HG: ICD-10-PCS | Mod: CPTII,S$GLB,, | Performed by: NURSE PRACTITIONER

## 2022-09-15 PROCEDURE — 99214 OFFICE O/P EST MOD 30 MIN: CPT | Mod: S$GLB,,, | Performed by: NURSE PRACTITIONER

## 2022-09-15 RX ORDER — TAMSULOSIN HYDROCHLORIDE 0.4 MG/1
0.8 CAPSULE ORAL DAILY
Qty: 60 CAPSULE | Refills: 11 | Status: SHIPPED | OUTPATIENT
Start: 2022-09-15 | End: 2023-09-19

## 2022-09-15 NOTE — PROGRESS NOTES
Chief Complaint  Chief Complaint   Patient presents with    Follow-up     Pt states in for a follow-up.        HPI    Patient with burning with urination has been going on for approximately 1 year. Has been worsening. Feeling like he has to urinate and has difficulty with weak stream and dribbling. Was recently started on flomax in may of this year which he is taking once a day in the evening which does help at night but continues to suffer during the day. No prostate pain or pressure. No fever or chills. Suffers from back pain which is chronic.         PAST MEDICAL HISTORY:  Past Medical History:   Diagnosis Date    Allergy     Arthritis     Coronary artery disease     Diabetes mellitus, type 2     Hyperlipidemia     Hypertension     Stroke        PAST SURGICAL HISTORY:  Past Surgical History:   Procedure Laterality Date    CAROTID ENDARTERECTOMY      COLONOSCOPY N/A 12/13/2018    Procedure: COLONOSCOPY no thinners just ASA  ;  Surgeon: Tomás Grimm MD;  Location: [facility] ENDO;  Service: General;  Laterality: N/A;    COLONOSCOPY W/ POLYPECTOMY  12/13/2018    CORONARY ANGIOPLASTY WITH STENT PLACEMENT  05/04/2020    per  stent x2 and ballon angioplasty     CORONARY ANGIOPLASTY WITH STENT PLACEMENT Right 5/4/2020    Procedure: Angioplasty, Coronary Artery, With Stent Insertion;  Surgeon: Jesus Ricardo MD;  Location: [facility] CATH LAB;  Service: Cardiology;  Laterality: Right;    CORONARY STENT PLACEMENT      LEFT HEART CATHETERIZATION N/A 5/4/2020    Procedure: Left heart cath;  Surgeon: Jesus Ricardo MD;  Location: [facility] CATH LAB;  Service: Cardiology;  Laterality: N/A;    LEG AMPUTATION      STOMACH SURGERY      WRIST SURGERY         SOCIAL HISTORY:  Social History     Socioeconomic History    Marital status:    Tobacco Use    Smoking status: Every Day     Packs/day: 2.00     Types: Cigarettes    Smokeless tobacco: Never   Substance and Sexual Activity    Alcohol use: No    Drug use: No    Sexual  "activity: Never       FAMILY HISTORY:  Family History   Family history unknown: Yes       ALLERGIES AND MEDICATIONS: updated and reviewed.  Review of patient's allergies indicates:  No Known Allergies  Current Outpatient Medications   Medication Sig Dispense Refill    albuterol (PROVENTIL/VENTOLIN HFA) 90 mcg/actuation inhaler Inhale 1-2 puffs into the lungs every 6 (six) hours as needed for Shortness of Breath. Rescue 17 g 0    aspirin (ECOTRIN) 81 MG EC tablet Take 81 mg by mouth once daily.      atorvastatin (LIPITOR) 80 MG tablet Take 1 tablet (80 mg total) by mouth once daily. 90 tablet 3    baclofen (LIORESAL) 20 MG tablet Take 20 mg by mouth 5 (five) times daily. Takes 5 times daily. Total of 120 mg  1    BD BETTINA 2ND GEN PEN NEEDLE 32 gauge x 5/32" Ndle To use with insulin injections 4 times per day 150 each 11    blood sugar diagnostic Strp To check BG 4 times daily, to use with insurance preferred meter 150 each 11    blood-glucose meter kit To check BG 4 times daily, to use with insurance preferred meter 1 each 0    carvediloL (COREG) 6.25 MG tablet Take 1 tablet (6.25 mg total) by mouth 2 (two) times daily with meals. 60 tablet 11    clopidogreL (PLAVIX) 75 mg tablet Take 1 tablet (75 mg total) by mouth once daily. 90 tablet 3    folic acid (FOLVITE) 1 MG tablet TAKE ONE TABLET BY MOUTH DAILY 30 tablet 0    glucagon (BAQSIMI) 3 mg/actuation Spry 3 mg (one actuation) into a single nostril; if no response, may repeat in 15 minutes using a new intranasal device. 2 each 1    hydrocodone-acetaminophen 10-325mg (NORCO)  mg Tab Take 1 tablet by mouth every 6 (six) hours as needed.   0    insulin aspart U-100 (NOVOLOG FLEXPEN U-100 INSULIN) 100 unit/mL (3 mL) InPn pen Inject 6-8 units 3 times per day with food + correction scale goal 150, +2. Max  TDD of 50 units 1 Box 6    insulin glargine-lixisenatide (SOLIQUA 100/33) 100 unit-33 mcg/mL InPn pen Inject 24 Units into the skin daily with breakfast. 5 pen 6 "    JARDIANCE 10 mg tablet TAKE ONE TABLET BY MOUTH DAILY 30 tablet 4    lancets Misc To check BG 4 times daily, to use with insurance preferred meter 150 each 11    lisinopriL 10 MG tablet Take 1 tablet (10 mg total) by mouth once daily. 90 tablet 3    metFORMIN (GLUCOPHAGE) 1000 MG tablet TAKE ONE TABLET BY MOUTH TWICE DAILY WITH MEALS 180 tablet 0    nitroGLYCERIN (NITROSTAT) 0.4 MG SL tablet Place 1 tablet (0.4 mg total) under the tongue every 5 (five) minutes as needed for Chest pain. 14 tablet 6    pantoprazole (PROTONIX) 40 MG tablet Take 1 tablet (40 mg total) by mouth once daily. 90 tablet 3    DULoxetine (CYMBALTA) 60 MG capsule TAKE 1 CAPSULE (60 MG TOTAL) BY MOUTH ONCE DAILY. 90 capsule 0    tamsulosin (FLOMAX) 0.4 mg Cap Take 2 capsules (0.8 mg total) by mouth once daily. 60 capsule 11     No current facility-administered medications for this visit.         ROS  Review of Systems   Constitutional:  Negative for chills and fever.   HENT:  Negative for ear pain, postnasal drip and sinus pain.    Respiratory:  Negative for cough and shortness of breath.    Cardiovascular:  Negative for chest pain.   Gastrointestinal:  Negative for diarrhea, nausea and vomiting.   Genitourinary:  Positive for decreased urine volume (dribbling), dysuria, frequency and urgency.   Musculoskeletal:  Positive for back pain (chronic).         PHYSICAL EXAM  Vitals:    09/15/22 0937   BP: 124/62   BP Location: Right arm   Patient Position: Sitting   BP Method: Medium (Manual)   Pulse: 72   Resp: 18   Temp: 97 °F (36.1 °C)   TempSrc: Temporal   SpO2: 98%   Weight: 66.2 kg (145 lb 15.1 oz)   Height: 6' (1.829 m)    Body mass index is 19.79 kg/m².  Weight: 66.2 kg (145 lb 15.1 oz)   Height: 6' (182.9 cm)     Physical Exam  Exam conducted with a chaperone present.   Constitutional:       Appearance: He is well-developed.   HENT:      Head: Normocephalic.      Right Ear: Tympanic membrane normal.      Left Ear: Tympanic membrane normal.       Mouth/Throat:      Pharynx: Uvula midline.   Eyes:      Conjunctiva/sclera: Conjunctivae normal.   Cardiovascular:      Rate and Rhythm: Normal rate and regular rhythm.      Pulses: Normal pulses.           Radial pulses are 2+ on the right side and 2+ on the left side.      Heart sounds: Normal heart sounds. No murmur heard.     Comments: No LE swelling noted  Pulmonary:      Effort: Pulmonary effort is normal.      Breath sounds: Decreased breath sounds and wheezing present.   Abdominal:      General: Bowel sounds are normal.      Palpations: Abdomen is soft.      Tenderness: There is no abdominal tenderness.   Genitourinary:     Prostate: Not enlarged, not tender and no nodules present.      Rectum: External hemorrhoid present.   Musculoskeletal:      Right Lower Extremity: Right leg is amputated below knee.      Left Lower Extremity: Left leg is amputated below knee.   Lymphadenopathy:      Cervical: No cervical adenopathy.   Skin:     General: Skin is warm and dry.      Findings: No rash.   Neurological:      Mental Status: He is alert and oriented to person, place, and time.         Health Maintenance         Date Due Completion Date    Eye Exam 05/07/2020 5/7/2019    Diabetes Urine Screening 09/24/2021 9/24/2020    COVID-19 Vaccine (4 - Booster for Pfizer series) 02/08/2022 10/8/2021    Shingles Vaccine (2 of 2) 03/08/2022 1/11/2022    Hemoglobin A1c 08/08/2022 5/8/2022    Influenza Vaccine (1) 09/01/2022 1/11/2022    PROSTATE-SPECIFIC ANTIGEN 10/08/2022 10/8/2021    Lipid Panel 10/08/2022 10/8/2021    High Dose Statin 08/22/2023 8/22/2022    Colorectal Cancer Screening 12/13/2023 12/13/2018    TETANUS VACCINE 11/14/2028 11/14/2018              Assessment & Plan    Problem List Items Addressed This Visit          Unprioritized    COPD (chronic obstructive pulmonary disease)  No interest in smoking cessation at this time.  No interest in maintenance inhalers.      Essential hypertension  Controlled.   "Continue on current regimen.      Moderate episode of recurrent major depressive disorder    Tobacco abuse    Type 2 diabetes mellitus with hyperlipidemia  Sugars appear to be controlled.  One hundred sixty this morning.  Repeat A1c today.     Other Visit Diagnoses       Weak urinary stream    -  Primary    Relevant Orders    POCT URINE DIPSTICK WITHOUT MICROSCOPE (Completed)    Benign prostatic hyperplasia, unspecified whether lower urinary tract symptoms present        Relevant Medications    tamsulosin (FLOMAX) 0.4 mg Cap  No tenderness on examination.  Increase Flomax to 0.8 mg daily.  Consider urology referral.            Follow-up: Follow up in about 3 months (around 12/15/2022).    Julieta Rosa    Medication List with Changes/Refills   Current Medications    ALBUTEROL (PROVENTIL/VENTOLIN HFA) 90 MCG/ACTUATION INHALER    Inhale 1-2 puffs into the lungs every 6 (six) hours as needed for Shortness of Breath. Rescue    ASPIRIN (ECOTRIN) 81 MG EC TABLET    Take 81 mg by mouth once daily.    ATORVASTATIN (LIPITOR) 80 MG TABLET    Take 1 tablet (80 mg total) by mouth once daily.    BACLOFEN (LIORESAL) 20 MG TABLET    Take 20 mg by mouth 5 (five) times daily. Takes 5 times daily. Total of 120 mg    BD BETTINA 2ND GEN PEN NEEDLE 32 GAUGE X 5/32" NDLE    To use with insulin injections 4 times per day    BLOOD SUGAR DIAGNOSTIC STRP    To check BG 4 times daily, to use with insurance preferred meter    BLOOD-GLUCOSE METER KIT    To check BG 4 times daily, to use with insurance preferred meter    CARVEDILOL (COREG) 6.25 MG TABLET    Take 1 tablet (6.25 mg total) by mouth 2 (two) times daily with meals.    CLOPIDOGREL (PLAVIX) 75 MG TABLET    Take 1 tablet (75 mg total) by mouth once daily.    DULOXETINE (CYMBALTA) 60 MG CAPSULE    TAKE 1 CAPSULE (60 MG TOTAL) BY MOUTH ONCE DAILY.    FOLIC ACID (FOLVITE) 1 MG TABLET    TAKE ONE TABLET BY MOUTH DAILY    GLUCAGON (BAQSIMI) 3 MG/ACTUATION SPRY    3 mg (one actuation) into a " single nostril; if no response, may repeat in 15 minutes using a new intranasal device.    HYDROCODONE-ACETAMINOPHEN 10-325MG (NORCO)  MG TAB    Take 1 tablet by mouth every 6 (six) hours as needed.     INSULIN ASPART U-100 (NOVOLOG FLEXPEN U-100 INSULIN) 100 UNIT/ML (3 ML) INPN PEN    Inject 6-8 units 3 times per day with food + correction scale goal 150, +2. Max  TDD of 50 units    INSULIN GLARGINE-LIXISENATIDE (SOLIQUA 100/33) 100 UNIT-33 MCG/ML INPN PEN    Inject 24 Units into the skin daily with breakfast.    JARDIANCE 10 MG TABLET    TAKE ONE TABLET BY MOUTH DAILY    LANCETS [facility] [place]    To check BG 4 times daily, to use with insurance preferred meter    LISINOPRIL 10 MG TABLET    Take 1 tablet (10 mg total) by mouth once daily.    METFORMIN (GLUCOPHAGE) 1000 MG TABLET    TAKE ONE TABLET BY MOUTH TWICE DAILY WITH MEALS    NITROGLYCERIN (NITROSTAT) 0.4 MG SL TABLET    Place 1 tablet (0.4 mg total) under the tongue every 5 (five) minutes as needed for Chest pain.    PANTOPRAZOLE (PROTONIX) 40 MG TABLET    Take 1 tablet (40 mg total) by mouth once daily.   Changed and/or Refilled Medications    Modified Medication Previous Medication    TAMSULOSIN (FLOMAX) 0.4 MG CAP tamsulosin (FLOMAX) 0.4 mg Cap       Take 2 capsules (0.8 mg total) by mouth once daily.    Take 1 capsule (0.4 mg total) by mouth once daily.

## 2022-10-12 DIAGNOSIS — E11.9 TYPE 2 DIABETES MELLITUS WITHOUT COMPLICATION: ICD-10-CM

## 2022-10-12 DIAGNOSIS — E11.9 TYPE 2 DIABETES MELLITUS WITHOUT COMPLICATION, UNSPECIFIED WHETHER LONG TERM INSULIN USE: ICD-10-CM

## 2022-10-19 ENCOUNTER — OFFICE VISIT (OUTPATIENT)
Dept: PRIMARY CARE CLINIC | Facility: CLINIC | Age: 67
End: 2022-10-19
Payer: MEDICARE

## 2022-10-19 VITALS
OXYGEN SATURATION: 98 % | BODY MASS INDEX: 19.49 KG/M2 | RESPIRATION RATE: 18 BRPM | TEMPERATURE: 98 F | WEIGHT: 143.88 LBS | HEIGHT: 72 IN | DIASTOLIC BLOOD PRESSURE: 74 MMHG | SYSTOLIC BLOOD PRESSURE: 126 MMHG | HEART RATE: 79 BPM

## 2022-10-19 DIAGNOSIS — E78.5 HYPERLIPIDEMIA, UNSPECIFIED HYPERLIPIDEMIA TYPE: ICD-10-CM

## 2022-10-19 DIAGNOSIS — Z72.0 TOBACCO ABUSE: ICD-10-CM

## 2022-10-19 DIAGNOSIS — Z91.199 MEDICAL NON-COMPLIANCE: ICD-10-CM

## 2022-10-19 DIAGNOSIS — Z12.5 PROSTATE CANCER SCREENING: ICD-10-CM

## 2022-10-19 DIAGNOSIS — E78.5 TYPE 2 DIABETES MELLITUS WITH HYPERLIPIDEMIA: Primary | ICD-10-CM

## 2022-10-19 DIAGNOSIS — Z12.2 SCREENING FOR LUNG CANCER: ICD-10-CM

## 2022-10-19 DIAGNOSIS — Z79.4 TYPE 2 DIABETES MELLITUS WITH HYPERGLYCEMIA, WITH LONG-TERM CURRENT USE OF INSULIN: ICD-10-CM

## 2022-10-19 DIAGNOSIS — E11.69 TYPE 2 DIABETES MELLITUS WITH HYPERLIPIDEMIA: Primary | ICD-10-CM

## 2022-10-19 DIAGNOSIS — Z23 NEED FOR VACCINATION: ICD-10-CM

## 2022-10-19 DIAGNOSIS — E11.65 TYPE 2 DIABETES MELLITUS WITH HYPERGLYCEMIA, WITH LONG-TERM CURRENT USE OF INSULIN: ICD-10-CM

## 2022-10-19 DIAGNOSIS — Z87.891 PERSONAL HISTORY OF NICOTINE DEPENDENCE: ICD-10-CM

## 2022-10-19 PROCEDURE — 3078F PR MOST RECENT DIASTOLIC BLOOD PRESSURE < 80 MM HG: ICD-10-PCS | Mod: CPTII,S$GLB,, | Performed by: FAMILY MEDICINE

## 2022-10-19 PROCEDURE — 4010F PR ACE/ARB THEARPY RXD/TAKEN: ICD-10-PCS | Mod: CPTII,S$GLB,, | Performed by: FAMILY MEDICINE

## 2022-10-19 PROCEDURE — 1159F MED LIST DOCD IN RCRD: CPT | Mod: CPTII,S$GLB,, | Performed by: FAMILY MEDICINE

## 2022-10-19 PROCEDURE — G0008 FLU VACCINE (QUAD) GREATER THAN OR EQUAL TO 3YO PRESERVATIVE FREE IM: ICD-10-PCS | Mod: S$GLB,,, | Performed by: FAMILY MEDICINE

## 2022-10-19 PROCEDURE — 90686 FLU VACCINE (QUAD) GREATER THAN OR EQUAL TO 3YO PRESERVATIVE FREE IM: ICD-10-PCS | Mod: S$GLB,,, | Performed by: FAMILY MEDICINE

## 2022-10-19 PROCEDURE — 99999 PR PBB SHADOW E&M-EST. PATIENT-LVL V: CPT | Mod: PBBFAC,,, | Performed by: FAMILY MEDICINE

## 2022-10-19 PROCEDURE — 3052F PR MOST RECENT HEMOGLOBIN A1C LEVEL 8.0 - < 9.0%: ICD-10-PCS | Mod: CPTII,S$GLB,, | Performed by: FAMILY MEDICINE

## 2022-10-19 PROCEDURE — 1160F PR REVIEW ALL MEDS BY PRESCRIBER/CLIN PHARMACIST DOCUMENTED: ICD-10-PCS | Mod: CPTII,S$GLB,, | Performed by: FAMILY MEDICINE

## 2022-10-19 PROCEDURE — 99499 UNLISTED E&M SERVICE: CPT | Mod: HCNC,S$GLB,, | Performed by: FAMILY MEDICINE

## 2022-10-19 PROCEDURE — 3052F HG A1C>EQUAL 8.0%<EQUAL 9.0%: CPT | Mod: CPTII,S$GLB,, | Performed by: FAMILY MEDICINE

## 2022-10-19 PROCEDURE — 4010F ACE/ARB THERAPY RXD/TAKEN: CPT | Mod: CPTII,S$GLB,, | Performed by: FAMILY MEDICINE

## 2022-10-19 PROCEDURE — 3066F PR DOCUMENTATION OF TREATMENT FOR NEPHROPATHY: ICD-10-PCS | Mod: CPTII,S$GLB,, | Performed by: FAMILY MEDICINE

## 2022-10-19 PROCEDURE — 99214 PR OFFICE/OUTPT VISIT, EST, LEVL IV, 30-39 MIN: ICD-10-PCS | Mod: S$GLB,,, | Performed by: FAMILY MEDICINE

## 2022-10-19 PROCEDURE — 99214 OFFICE O/P EST MOD 30 MIN: CPT | Mod: S$GLB,,, | Performed by: FAMILY MEDICINE

## 2022-10-19 PROCEDURE — 90686 IIV4 VACC NO PRSV 0.5 ML IM: CPT | Mod: S$GLB,,, | Performed by: FAMILY MEDICINE

## 2022-10-19 PROCEDURE — 3074F PR MOST RECENT SYSTOLIC BLOOD PRESSURE < 130 MM HG: ICD-10-PCS | Mod: CPTII,S$GLB,, | Performed by: FAMILY MEDICINE

## 2022-10-19 PROCEDURE — 3062F PR POS MACROALBUMINURIA RESULT DOCUMENTED/REVIEW: ICD-10-PCS | Mod: CPTII,S$GLB,, | Performed by: FAMILY MEDICINE

## 2022-10-19 PROCEDURE — 99999 PR PBB SHADOW E&M-EST. PATIENT-LVL V: ICD-10-PCS | Mod: PBBFAC,,, | Performed by: FAMILY MEDICINE

## 2022-10-19 PROCEDURE — 3074F SYST BP LT 130 MM HG: CPT | Mod: CPTII,S$GLB,, | Performed by: FAMILY MEDICINE

## 2022-10-19 PROCEDURE — 1159F PR MEDICATION LIST DOCUMENTED IN MEDICAL RECORD: ICD-10-PCS | Mod: CPTII,S$GLB,, | Performed by: FAMILY MEDICINE

## 2022-10-19 PROCEDURE — 3062F POS MACROALBUMINURIA REV: CPT | Mod: CPTII,S$GLB,, | Performed by: FAMILY MEDICINE

## 2022-10-19 PROCEDURE — 1160F RVW MEDS BY RX/DR IN RCRD: CPT | Mod: CPTII,S$GLB,, | Performed by: FAMILY MEDICINE

## 2022-10-19 PROCEDURE — 3066F NEPHROPATHY DOC TX: CPT | Mod: CPTII,S$GLB,, | Performed by: FAMILY MEDICINE

## 2022-10-19 PROCEDURE — G0008 ADMIN INFLUENZA VIRUS VAC: HCPCS | Mod: S$GLB,,, | Performed by: FAMILY MEDICINE

## 2022-10-19 PROCEDURE — 3078F DIAST BP <80 MM HG: CPT | Mod: CPTII,S$GLB,, | Performed by: FAMILY MEDICINE

## 2022-10-19 RX ORDER — METFORMIN HYDROCHLORIDE 1000 MG/1
1000 TABLET ORAL 2 TIMES DAILY WITH MEALS
Qty: 180 TABLET | Refills: 3 | Status: SHIPPED | OUTPATIENT
Start: 2022-10-19 | End: 2023-10-16

## 2022-10-19 NOTE — PROGRESS NOTES
"Subjective:       Patient ID: Jordan Mcpherson is a 66 y.o. male.    Chief Complaint: Follow-up (Pt in for a follow-up)    Diabetes has improved, remains uncontrolled as evidenced by A1c of 8.6%.  Compliant with oral medications, but he has not been taking his insulin regularly, says he "does on the bothered with taking shots. " not particularly motivated to start using insulin regularly.  Denies chest pain or shortness of breath.  Continues to smoke heavily, 2 packs a day, says he has no interest in quitting.    Review of Systems   Constitutional:  Negative for fever.   Respiratory:  Negative for shortness of breath.    Cardiovascular:  Negative for chest pain.   Endocrine: Negative for polydipsia and polyuria.   Musculoskeletal:  Positive for arthralgias and back pain.   Neurological:  Negative for dizziness.   Psychiatric/Behavioral:  The patient is nervous/anxious.      Objective:      Vitals:    10/19/22 1122   BP: 126/74   BP Location: Right arm   Patient Position: Sitting   BP Method: Medium (Manual)   Pulse: 79   Resp: 18   Temp: 97.8 °F (36.6 °C)   TempSrc: Temporal   SpO2: 98%   Weight: 65.2 kg (143 lb 13.6 oz)   Height: 6' (1.829 m)     Physical Exam  Vitals and nursing note reviewed.   Constitutional:       General: He is not in acute distress.     Appearance: Normal appearance. He is well-developed.   HENT:      Head: Normocephalic and atraumatic.   Cardiovascular:      Rate and Rhythm: Normal rate and regular rhythm.      Heart sounds: Normal heart sounds.   Pulmonary:      Effort: Pulmonary effort is normal.      Breath sounds: Normal breath sounds.   Musculoskeletal:      Right lower leg: No edema.      Left lower leg: No edema.   Skin:     General: Skin is warm and dry.   Neurological:      Mental Status: He is alert and oriented to person, place, and time.   Psychiatric:         Mood and Affect: Mood normal.         Behavior: Behavior normal.       Lab Results   Component Value Date    WBC 12.24 " 05/09/2022    HGB 13.2 (L) 05/09/2022    HCT 39.8 (L) 05/09/2022     05/09/2022    CHOL 149 10/08/2021    TRIG 134 10/08/2021    HDL 37 (L) 10/08/2021    ALT 9 (L) 05/09/2022    AST 8 (L) 05/09/2022     (L) 05/09/2022    K 4.5 05/09/2022     05/09/2022    CREATININE 0.9 10/19/2022    BUN 35 (H) 05/09/2022    CO2 22 (L) 05/09/2022    TSH 1.98 07/13/2018    PSA 0.70 10/08/2021    PSA 0.69 10/08/2021    HGBA1C 8.6 (H) 09/15/2022      Assessment:       1. Type 2 diabetes mellitus with hyperlipidemia    2. Need for vaccination    3. Type 2 diabetes mellitus with hyperglycemia, with long-term current use of insulin    4. Medical non-compliance    5. Prostate cancer screening    6. Hyperlipidemia, unspecified hyperlipidemia type    7. Tobacco abuse    8. Screening for lung cancer    9. Personal history of nicotine dependence          Plan:       Type 2 diabetes mellitus with hyperlipidemia  -     empagliflozin (JARDIANCE) 25 mg tablet; Take 1 tablet (25 mg total) by mouth once daily.  Dispense: 90 tablet; Refill: 3  -     Comprehensive Metabolic Panel; Future; Expected date: 01/19/2023  -     Hemoglobin A1C; Future; Expected date: 01/19/2023  -     metFORMIN (GLUCOPHAGE) 1000 MG tablet; Take 1 tablet (1,000 mg total) by mouth 2 (two) times daily with meals.  Dispense: 180 tablet; Refill: 3  Not taking insulin regular, but willing to take oral medications.  Increase Jardiance to 25 mg daily reduce carb intake and recheck labs 3 months  Need for vaccination  -     Influenza - Quadrivalent *Preferred* (6 months+) (PF)    Type 2 diabetes mellitus with hyperglycemia, with long-term current use of insulin  -     empagliflozin (JARDIANCE) 25 mg tablet; Take 1 tablet (25 mg total) by mouth once daily.  Dispense: 90 tablet; Refill: 3  -     Comprehensive Metabolic Panel; Future; Expected date: 01/19/2023  -     Hemoglobin A1C; Future; Expected date: 01/19/2023    Medical non-compliance  Stressed importance of  "taking all meds as prescribed  Prostate cancer screening  -     PSA, Screening; Future; Expected date: 01/19/2023    Hyperlipidemia, unspecified hyperlipidemia type  -     Comprehensive Metabolic Panel; Future; Expected date: 01/19/2023  -     Lipid Panel; Future; Expected date: 01/19/2023    Tobacco abuse  -     CT Chest Lung Screening Low Dose; Future; Expected date: 10/19/2022  Not interested in quitting, willing to get lung cancer screening  Screening for lung cancer  -     CT Chest Lung Screening Low Dose; Future; Expected date: 10/19/2022    Personal history of nicotine dependence  -     CT Chest Lung Screening Low Dose; Future; Expected date: 10/19/2022      Medication List with Changes/Refills   New Medications    EMPAGLIFLOZIN (JARDIANCE) 25 MG TABLET    Take 1 tablet (25 mg total) by mouth once daily.   Current Medications    ALBUTEROL (PROVENTIL/VENTOLIN HFA) 90 MCG/ACTUATION INHALER    Inhale 1-2 puffs into the lungs every 6 (six) hours as needed for Shortness of Breath. Rescue    ASPIRIN (ECOTRIN) 81 MG EC TABLET    Take 81 mg by mouth once daily.    ATORVASTATIN (LIPITOR) 80 MG TABLET    Take 1 tablet (80 mg total) by mouth once daily.    BACLOFEN (LIORESAL) 20 MG TABLET    Take 20 mg by mouth 5 (five) times daily. Takes 5 times daily. Total of 120 mg    BD BETTINA 2ND GEN PEN NEEDLE 32 GAUGE X 5/32" NDLE    To use with insulin injections 4 times per day    BLOOD SUGAR DIAGNOSTIC STRP    To check BG 4 times daily, to use with insurance preferred meter    BLOOD-GLUCOSE METER KIT    To check BG 4 times daily, to use with insurance preferred meter    CARVEDILOL (COREG) 6.25 MG TABLET    Take 1 tablet (6.25 mg total) by mouth 2 (two) times daily with meals.    CLOPIDOGREL (PLAVIX) 75 MG TABLET    Take 1 tablet (75 mg total) by mouth once daily.    DULOXETINE (CYMBALTA) 60 MG CAPSULE    TAKE 1 CAPSULE (60 MG TOTAL) BY MOUTH ONCE DAILY.    FOLIC ACID (FOLVITE) 1 MG TABLET    TAKE ONE TABLET BY MOUTH DAILY    " GLUCAGON (BAQSIMI) 3 MG/ACTUATION SPRY    3 mg (one actuation) into a single nostril; if no response, may repeat in 15 minutes using a new intranasal device.    HYDROCODONE-ACETAMINOPHEN 10-325MG (NORCO)  MG TAB    Take 1 tablet by mouth every 6 (six) hours as needed.     INSULIN ASPART U-100 (NOVOLOG FLEXPEN U-100 INSULIN) 100 UNIT/ML (3 ML) INPN PEN    Inject 6-8 units 3 times per day with food + correction scale goal 150, +2. Max  TDD of 50 units    INSULIN GLARGINE-LIXISENATIDE (SOLIQUA 100/33) 100 UNIT-33 MCG/ML INPN PEN    Inject 24 Units into the skin daily with breakfast.    LANCETS MISC    To check BG 4 times daily, to use with insurance preferred meter    LISINOPRIL 10 MG TABLET    Take 1 tablet (10 mg total) by mouth once daily.    NITROGLYCERIN (NITROSTAT) 0.4 MG SL TABLET    Place 1 tablet (0.4 mg total) under the tongue every 5 (five) minutes as needed for Chest pain.    PANTOPRAZOLE (PROTONIX) 40 MG TABLET    Take 1 tablet (40 mg total) by mouth once daily.    TAMSULOSIN (FLOMAX) 0.4 MG CAP    Take 2 capsules (0.8 mg total) by mouth once daily.   Changed and/or Refilled Medications    Modified Medication Previous Medication    METFORMIN (GLUCOPHAGE) 1000 MG TABLET metFORMIN (GLUCOPHAGE) 1000 MG tablet       Take 1 tablet (1,000 mg total) by mouth 2 (two) times daily with meals.    TAKE ONE TABLET BY MOUTH TWICE DAILY WITH MEALS   Discontinued Medications    JARDIANCE 10 MG TABLET    TAKE ONE TABLET BY MOUTH DAILY

## 2022-10-26 DIAGNOSIS — R91.1 SOLITARY PULMONARY NODULE: Primary | ICD-10-CM

## 2022-10-26 DIAGNOSIS — Z87.891 PERSONAL HISTORY OF NICOTINE DEPENDENCE: ICD-10-CM

## 2022-11-11 ENCOUNTER — PATIENT OUTREACH (OUTPATIENT)
Dept: ADMINISTRATIVE | Facility: HOSPITAL | Age: 67
End: 2022-11-11
Payer: MEDICARE

## 2022-11-11 NOTE — PROGRESS NOTES
Health Maintenance Due   Topic Date Due    Pneumococcal Vaccines (Age 65+) (2 - PCV) 07/11/2019    Eye Exam  05/07/2020    COVID-19 Vaccine (4 - Booster for Pfizer series) 12/03/2021    Shingles Vaccine (2 of 2) 03/08/2022    PROSTATE-SPECIFIC ANTIGEN  10/08/2022    Lipid Panel  10/08/2022    Abdominal Aortic Aneurysm Screening  Never done

## 2023-02-04 DIAGNOSIS — Z79.4 TYPE 2 DIABETES MELLITUS WITH HYPERGLYCEMIA, WITH LONG-TERM CURRENT USE OF INSULIN: ICD-10-CM

## 2023-02-04 DIAGNOSIS — E11.65 TYPE 2 DIABETES MELLITUS WITH HYPERGLYCEMIA, WITH LONG-TERM CURRENT USE OF INSULIN: ICD-10-CM

## 2023-02-05 RX ORDER — EMPAGLIFLOZIN 10 MG/1
TABLET, FILM COATED ORAL
Qty: 30 TABLET | Refills: 4 | OUTPATIENT
Start: 2023-02-05

## 2023-02-05 NOTE — TELEPHONE ENCOUNTER
Refill Decision Note   Jordan Mcpherson  is requesting a refill authorization.  Brief Assessment and Rationale for Refill:  Quick Discontinue     Medication Therapy Plan:  PCP DISCONTINUED 10 MG AND CHANGED TO 25 MG 10/19/22    Medication Reconciliation Completed: No   Comments:     No Care Gaps recommended.     Note composed:7:53 AM 02/05/2023

## 2023-03-09 DIAGNOSIS — Z79.4 TYPE 2 DIABETES MELLITUS WITH HYPERGLYCEMIA, WITH LONG-TERM CURRENT USE OF INSULIN: ICD-10-CM

## 2023-03-09 DIAGNOSIS — E11.65 TYPE 2 DIABETES MELLITUS WITH HYPERGLYCEMIA, WITH LONG-TERM CURRENT USE OF INSULIN: ICD-10-CM

## 2023-03-09 RX ORDER — EMPAGLIFLOZIN 10 MG/1
TABLET, FILM COATED ORAL
Qty: 30 TABLET | Refills: 4 | OUTPATIENT
Start: 2023-03-09

## 2023-03-09 NOTE — TELEPHONE ENCOUNTER
Refill Decision Note  Giselle DC. Inappropriate Request     Jordan Ky  is requesting a refill authorization.  Brief Assessment and Rationale for Refill:  Quick Discontinue     Medication Therapy Plan:  discontinued on 10/19/2022 by Mike Morley MD for the following reason: Dose adjustment. 25MG    Medication Reconciliation Completed: No   Comments:     No Care Gaps recommended.     Note composed:9:28 AM 03/09/2023

## 2023-03-10 ENCOUNTER — TELEPHONE (OUTPATIENT)
Dept: DIABETES | Facility: CLINIC | Age: 68
End: 2023-03-10
Payer: MEDICARE

## 2023-03-10 NOTE — TELEPHONE ENCOUNTER
----- Message from Lisy Mondragon sent at 3/10/2023  1:26 PM CST -----  Regarding: medication refills  Pharmacy called with medication refill request    insulin glargine-lixisenatide (SOLIQUA 100/33) 100 unit-33 mcg/mL InPn pen  insulin aspart U-100 (NOVOLOG FLEXPEN U-100 INSULIN) 100 unit/mL (3 mL) InPn pen    Patient can be contacted @# [phone] (home)

## 2023-07-06 ENCOUNTER — PATIENT OUTREACH (OUTPATIENT)
Dept: ADMINISTRATIVE | Facility: HOSPITAL | Age: 68
End: 2023-07-06
Payer: MEDICARE

## 2023-07-06 ENCOUNTER — TELEPHONE (OUTPATIENT)
Dept: ADMINISTRATIVE | Facility: HOSPITAL | Age: 68
End: 2023-07-06
Payer: MEDICARE

## 2023-07-06 DIAGNOSIS — E11.29 MICROALBUMINURIA DUE TO TYPE 2 DIABETES MELLITUS: Primary | ICD-10-CM

## 2023-07-06 DIAGNOSIS — E78.5 TYPE 2 DIABETES MELLITUS WITH HYPERLIPIDEMIA: ICD-10-CM

## 2023-07-06 DIAGNOSIS — E11.65 TYPE 2 DIABETES MELLITUS WITH HYPERGLYCEMIA, WITH LONG-TERM CURRENT USE OF INSULIN: ICD-10-CM

## 2023-07-06 DIAGNOSIS — E11.69 TYPE 2 DIABETES MELLITUS WITH HYPERLIPIDEMIA: ICD-10-CM

## 2023-07-06 DIAGNOSIS — I10 ESSENTIAL HYPERTENSION: ICD-10-CM

## 2023-07-06 DIAGNOSIS — Z79.4 TYPE 2 DIABETES MELLITUS WITH HYPERGLYCEMIA, WITH LONG-TERM CURRENT USE OF INSULIN: ICD-10-CM

## 2023-07-06 DIAGNOSIS — R80.9 MICROALBUMINURIA DUE TO TYPE 2 DIABETES MELLITUS: Primary | ICD-10-CM

## 2023-07-06 DIAGNOSIS — Z12.5 SCREENING FOR PROSTATE CANCER: ICD-10-CM

## 2023-07-06 NOTE — TELEPHONE ENCOUNTER
Called pt regarding message. Informed pt lab orders has been placed. Pt verbalized understanding.

## 2023-07-06 NOTE — LETTER
AUTHORIZATION FOR RELEASE OF   CONFIDENTIAL INFORMATION    Dear Dr. Dax Senior,    We are seeing Jordan Mcpherson, date of birth 1955, in the clinic at [facility]. Mike Morley MD is the patient's PCP. Jordan Mcpherson has an outstanding lab/procedure at the time we reviewed his chart. In order to help keep his health information updated, he has authorized us to request the following medical record(s):        (  )  MAMMOGRAM                                      (  )  COLONOSCOPY      (  )  PAP SMEAR                                          (  )  OUTSIDE LAB RESULTS     (  )  DEXA SCAN                                          (X)  EYE EXAM            (  )  FOOT EXAM                                          (  )  ENTIRE RECORD     (  )  OUTSIDE IMMUNIZATIONS                 (  )  _______________       ATTN: LORENA       Please fax records to [phone]     If you have any questions, please contact Lorena at [phone].           Patient Name: Jordan Mcpherson  : 1955  Patient Phone #: [phone]

## 2023-07-06 NOTE — PROGRESS NOTES
Health Maintenance Due   Topic Date Due    Pneumococcal Vaccines (Age 65+) (2 - PCV) 07/11/2019    Eye Exam  05/07/2020    COVID-19 Vaccine (4 - Pfizer series) 12/03/2021    Shingles Vaccine (2 of 2) 03/08/2022    PROSTATE-SPECIFIC ANTIGEN  10/08/2022    Lipid Panel  10/08/2022    Abdominal Aortic Aneurysm Screening  Never done    Hemoglobin A1c  12/15/2022     Chart Review Done     Eye exam- In the past, patient saw Dr. Senior, TJ sent for records.     Immunizations - reviewed and updated   Care Everywhere - triggered   Care Teams - updated   Outreach - [employer] Report reviewed. Spoke with patient in regards to labs, appointment scheduled for 8/4/2023. Patient has an appointment with PCP on 8/8/2023. Additional lab orders pended to PCP for review.

## 2023-07-06 NOTE — TELEPHONE ENCOUNTER
[employer] Report reviewed, patient due for Hemoglobin A1C. Additional labs due are Lipid and PSA. Patient has an appointment with PCP on 8/8/2023 for annual exam. Lab appointment scheduled for 8/4/2023. Lab orders pended. Please review.

## 2023-07-10 ENCOUNTER — PATIENT OUTREACH (OUTPATIENT)
Dept: ADMINISTRATIVE | Facility: HOSPITAL | Age: 68
End: 2023-07-10
Payer: MEDICARE

## 2023-07-10 NOTE — PROGRESS NOTES
Health Maintenance Due   Topic Date Due    Pneumococcal Vaccines (Age 65+) (2 - PCV) 07/11/2019    COVID-19 Vaccine (4 - Pfizer series) 12/03/2021    Shingles Vaccine (2 of 2) 03/08/2022    Eye Exam  03/16/2022    PROSTATE-SPECIFIC ANTIGEN  10/08/2022    Lipid Panel  10/08/2022    Abdominal Aortic Aneurysm Screening  Never done    Hemoglobin A1c  12/15/2022     Chart Review Done    Immunizations - reviewed and updated   Care Everywhere - triggered   Care Teams - updated   Outreach - Received eye exam results, 3/16/2021, uploaded to . Patient has an appointment with PCP 8/8/2023, eye exam added to appointment notes.

## 2023-07-21 RX ORDER — ATORVASTATIN CALCIUM 80 MG/1
TABLET, FILM COATED ORAL
Qty: 90 TABLET | Refills: 0 | Status: SHIPPED | OUTPATIENT
Start: 2023-07-21 | End: 2023-10-20

## 2023-07-21 RX ORDER — LISINOPRIL 10 MG/1
TABLET ORAL
Qty: 90 TABLET | Refills: 0 | Status: SHIPPED | OUTPATIENT
Start: 2023-07-21 | End: 2023-09-08 | Stop reason: SDUPTHER

## 2023-08-02 RX ORDER — CLOPIDOGREL BISULFATE 75 MG/1
75 TABLET ORAL DAILY
Qty: 90 TABLET | Refills: 0 | Status: SHIPPED | OUTPATIENT
Start: 2023-08-02 | End: 2023-11-07

## 2023-08-02 RX ORDER — PANTOPRAZOLE SODIUM 40 MG/1
40 TABLET, DELAYED RELEASE ORAL DAILY
Qty: 90 TABLET | Refills: 0 | Status: SHIPPED | OUTPATIENT
Start: 2023-08-02 | End: 2023-11-07

## 2023-08-02 NOTE — TELEPHONE ENCOUNTER
----- Message from Sondra Daniels sent at 8/2/2023  2:00 PM CDT -----  Contact: Reyna magallon/[employer] [phone]  Pt is needing refills on his pantoprazole (PROTONIX) 40 MG tablet 90 tablet and clopidogreL (PLAVIX) 75 mg tablet 90 tablet.                  Thank you

## 2023-08-02 NOTE — TELEPHONE ENCOUNTER
Refill Routing Note   Medication(s) are not appropriate for processing by [facility] for the following reason(s):      Patient seen in ED/Hospital since LOV with provider  No active prescription written by provider    [facility] action(s):  Defer Care Due:  Labs due-A1c overdue            Appointments  past 12m or future 3m with PCP    Date Provider   Last Visit   10/19/2022 Mike Morley MD   Next Visit   8/23/2023 Mike Morley MD   ED visits in past 90 days: 1        Note composed:3:10 PM 08/02/2023

## 2023-08-02 NOTE — TELEPHONE ENCOUNTER
Care Due:                  Date            Visit Type   Department     Provider  --------------------------------------------------------------------------------                                EP -                              [facility]      [facility]  Last Visit: 10-      CARE ([facility])   PRIMARY CARE   Mike Morley                               -                              PRIMARY      [facility]  Next Visit: 08-      CARE ([facility])   PRIMARY CARE   Mike Morley                                                            Last  Test          Frequency    Reason                     Performed    Due Date  --------------------------------------------------------------------------------    HBA1C.......  6 months...  empagliflozin, metFORMIN.  09-   03-    [facility] Embedded Care Due Messages. Reference number: [id number].   8/02/2023 2:14:13 PM CDT

## 2023-08-07 ENCOUNTER — TELEPHONE (OUTPATIENT)
Dept: PRIMARY CARE CLINIC | Facility: CLINIC | Age: 68
End: 2023-08-07
Payer: MEDICARE

## 2023-08-07 NOTE — TELEPHONE ENCOUNTER
----- Message from Julieta Rosa NP sent at 8/4/2023  5:13 PM CDT -----  Sugars are still running a bit high on blood work. Awaiting A1C.  Please make sure patient keeps follow up in 2 weeks with Dr. Morley so he can address any necessary medication changes.

## 2023-08-08 NOTE — TELEPHONE ENCOUNTER
Tried to call patient on 8/8/2023 and 8/9/2023 and all you get is a busy signal. Sent results and letter in mail.

## 2023-08-25 ENCOUNTER — PATIENT OUTREACH (OUTPATIENT)
Dept: ADMINISTRATIVE | Facility: HOSPITAL | Age: 68
End: 2023-08-25
Payer: MEDICARE

## 2023-08-25 NOTE — PROGRESS NOTES
Health Maintenance Due   Topic Date Due    Pneumococcal Vaccines (Age 65+) (2 - PCV) 07/11/2019    COVID-19 Vaccine (4 - Pfizer series) 12/03/2021    Shingles Vaccine (2 of 2) 03/08/2022    Eye Exam  03/16/2022    Abdominal Aortic Aneurysm Screening  Never done        Chart review done.    updated.   Immunizations reviewed & updated.   Care Everywhere updated.

## 2023-08-28 RX ORDER — CARVEDILOL 6.25 MG/1
6.25 TABLET ORAL 2 TIMES DAILY WITH MEALS
Qty: 60 TABLET | Refills: 0 | Status: SHIPPED | OUTPATIENT
Start: 2023-08-28 | End: 2023-10-19 | Stop reason: SDUPTHER

## 2023-08-28 NOTE — TELEPHONE ENCOUNTER
No care due was identified.  [facility] Embedded Care Due Messages. Reference number: [id number].   8/28/2023 4:24:59 PM CDT

## 2023-08-28 NOTE — TELEPHONE ENCOUNTER
Refill Routing Note   Medication(s) are not appropriate for processing by [facility] for the following reason(s):      Patient seen in ED/Hospital since LOV with provider  Required vitals abnormal    [facility] action(s):  Defer Care Due:  None identified              Appointments  past 12m or future 3m with PCP    Date Provider   Last Visit   10/19/2022 Mike Morley MD   Next Visit   Visit date not found Mike Morley MD   ED visits in past 90 days: 1        Note composed:4:25 PM 08/28/2023

## 2023-09-08 ENCOUNTER — OFFICE VISIT (OUTPATIENT)
Dept: PRIMARY CARE CLINIC | Facility: CLINIC | Age: 68
End: 2023-09-08
Payer: MEDICARE

## 2023-09-08 VITALS
OXYGEN SATURATION: 95 % | TEMPERATURE: 98 F | BODY MASS INDEX: 19.14 KG/M2 | WEIGHT: 141.31 LBS | RESPIRATION RATE: 18 BRPM | SYSTOLIC BLOOD PRESSURE: 160 MMHG | DIASTOLIC BLOOD PRESSURE: 70 MMHG | HEIGHT: 72 IN | HEART RATE: 86 BPM

## 2023-09-08 DIAGNOSIS — I10 BENIGN ESSENTIAL HTN: ICD-10-CM

## 2023-09-08 DIAGNOSIS — Z91.199 NONCOMPLIANCE WITH DIABETES TREATMENT: ICD-10-CM

## 2023-09-08 DIAGNOSIS — I73.9 PAD (PERIPHERAL ARTERY DISEASE): ICD-10-CM

## 2023-09-08 DIAGNOSIS — Z89.512 S/P BKA (BELOW KNEE AMPUTATION) BILATERAL: ICD-10-CM

## 2023-09-08 DIAGNOSIS — I50.32 CHRONIC DIASTOLIC CONGESTIVE HEART FAILURE: ICD-10-CM

## 2023-09-08 DIAGNOSIS — Z89.511 S/P BKA (BELOW KNEE AMPUTATION) BILATERAL: ICD-10-CM

## 2023-09-08 DIAGNOSIS — Z23 NEED FOR VACCINATION: ICD-10-CM

## 2023-09-08 DIAGNOSIS — E11.69 TYPE 2 DIABETES MELLITUS WITH HYPERLIPIDEMIA: ICD-10-CM

## 2023-09-08 DIAGNOSIS — Z00.00 ANNUAL PHYSICAL EXAM: Primary | ICD-10-CM

## 2023-09-08 DIAGNOSIS — E78.5 TYPE 2 DIABETES MELLITUS WITH HYPERLIPIDEMIA: ICD-10-CM

## 2023-09-08 PROCEDURE — 99214 OFFICE O/P EST MOD 30 MIN: CPT | Mod: HCNC,S$GLB,, | Performed by: STUDENT IN AN ORGANIZED HEALTH CARE EDUCATION/TRAINING PROGRAM

## 2023-09-08 PROCEDURE — 1125F PR PAIN SEVERITY QUANTIFIED, PAIN PRESENT: ICD-10-PCS | Mod: HCNC,CPTII,S$GLB, | Performed by: STUDENT IN AN ORGANIZED HEALTH CARE EDUCATION/TRAINING PROGRAM

## 2023-09-08 PROCEDURE — 1159F MED LIST DOCD IN RCRD: CPT | Mod: HCNC,CPTII,S$GLB, | Performed by: STUDENT IN AN ORGANIZED HEALTH CARE EDUCATION/TRAINING PROGRAM

## 2023-09-08 PROCEDURE — 3066F NEPHROPATHY DOC TX: CPT | Mod: HCNC,CPTII,S$GLB, | Performed by: STUDENT IN AN ORGANIZED HEALTH CARE EDUCATION/TRAINING PROGRAM

## 2023-09-08 PROCEDURE — 3052F HG A1C>EQUAL 8.0%<EQUAL 9.0%: CPT | Mod: HCNC,CPTII,S$GLB, | Performed by: STUDENT IN AN ORGANIZED HEALTH CARE EDUCATION/TRAINING PROGRAM

## 2023-09-08 PROCEDURE — 1160F RVW MEDS BY RX/DR IN RCRD: CPT | Mod: HCNC,CPTII,S$GLB, | Performed by: STUDENT IN AN ORGANIZED HEALTH CARE EDUCATION/TRAINING PROGRAM

## 2023-09-08 PROCEDURE — 3052F PR MOST RECENT HEMOGLOBIN A1C LEVEL 8.0 - < 9.0%: ICD-10-PCS | Mod: HCNC,CPTII,S$GLB, | Performed by: STUDENT IN AN ORGANIZED HEALTH CARE EDUCATION/TRAINING PROGRAM

## 2023-09-08 PROCEDURE — 3062F PR POS MACROALBUMINURIA RESULT DOCUMENTED/REVIEW: ICD-10-PCS | Mod: HCNC,CPTII,S$GLB, | Performed by: STUDENT IN AN ORGANIZED HEALTH CARE EDUCATION/TRAINING PROGRAM

## 2023-09-08 PROCEDURE — 1160F PR REVIEW ALL MEDS BY PRESCRIBER/CLIN PHARMACIST DOCUMENTED: ICD-10-PCS | Mod: HCNC,CPTII,S$GLB, | Performed by: STUDENT IN AN ORGANIZED HEALTH CARE EDUCATION/TRAINING PROGRAM

## 2023-09-08 PROCEDURE — 1125F AMNT PAIN NOTED PAIN PRSNT: CPT | Mod: HCNC,CPTII,S$GLB, | Performed by: STUDENT IN AN ORGANIZED HEALTH CARE EDUCATION/TRAINING PROGRAM

## 2023-09-08 PROCEDURE — 3066F PR DOCUMENTATION OF TREATMENT FOR NEPHROPATHY: ICD-10-PCS | Mod: HCNC,CPTII,S$GLB, | Performed by: STUDENT IN AN ORGANIZED HEALTH CARE EDUCATION/TRAINING PROGRAM

## 2023-09-08 PROCEDURE — 99214 PR OFFICE/OUTPT VISIT, EST, LEVL IV, 30-39 MIN: ICD-10-PCS | Mod: HCNC,S$GLB,, | Performed by: STUDENT IN AN ORGANIZED HEALTH CARE EDUCATION/TRAINING PROGRAM

## 2023-09-08 PROCEDURE — 3008F BODY MASS INDEX DOCD: CPT | Mod: HCNC,CPTII,S$GLB, | Performed by: STUDENT IN AN ORGANIZED HEALTH CARE EDUCATION/TRAINING PROGRAM

## 2023-09-08 PROCEDURE — 4010F ACE/ARB THERAPY RXD/TAKEN: CPT | Mod: HCNC,CPTII,S$GLB, | Performed by: STUDENT IN AN ORGANIZED HEALTH CARE EDUCATION/TRAINING PROGRAM

## 2023-09-08 PROCEDURE — 3288F FALL RISK ASSESSMENT DOCD: CPT | Mod: HCNC,CPTII,S$GLB, | Performed by: STUDENT IN AN ORGANIZED HEALTH CARE EDUCATION/TRAINING PROGRAM

## 2023-09-08 PROCEDURE — 99999 PR PBB SHADOW E&M-EST. PATIENT-LVL V: CPT | Mod: PBBFAC,HCNC,, | Performed by: STUDENT IN AN ORGANIZED HEALTH CARE EDUCATION/TRAINING PROGRAM

## 2023-09-08 PROCEDURE — 3078F DIAST BP <80 MM HG: CPT | Mod: HCNC,CPTII,S$GLB, | Performed by: STUDENT IN AN ORGANIZED HEALTH CARE EDUCATION/TRAINING PROGRAM

## 2023-09-08 PROCEDURE — 1159F PR MEDICATION LIST DOCUMENTED IN MEDICAL RECORD: ICD-10-PCS | Mod: HCNC,CPTII,S$GLB, | Performed by: STUDENT IN AN ORGANIZED HEALTH CARE EDUCATION/TRAINING PROGRAM

## 2023-09-08 PROCEDURE — 3062F POS MACROALBUMINURIA REV: CPT | Mod: HCNC,CPTII,S$GLB, | Performed by: STUDENT IN AN ORGANIZED HEALTH CARE EDUCATION/TRAINING PROGRAM

## 2023-09-08 PROCEDURE — 3078F PR MOST RECENT DIASTOLIC BLOOD PRESSURE < 80 MM HG: ICD-10-PCS | Mod: HCNC,CPTII,S$GLB, | Performed by: STUDENT IN AN ORGANIZED HEALTH CARE EDUCATION/TRAINING PROGRAM

## 2023-09-08 PROCEDURE — 1101F PR PT FALLS ASSESS DOC 0-1 FALLS W/OUT INJ PAST YR: ICD-10-PCS | Mod: HCNC,CPTII,S$GLB, | Performed by: STUDENT IN AN ORGANIZED HEALTH CARE EDUCATION/TRAINING PROGRAM

## 2023-09-08 PROCEDURE — 99999 PR PBB SHADOW E&M-EST. PATIENT-LVL V: ICD-10-PCS | Mod: PBBFAC,HCNC,, | Performed by: STUDENT IN AN ORGANIZED HEALTH CARE EDUCATION/TRAINING PROGRAM

## 2023-09-08 PROCEDURE — 3288F PR FALLS RISK ASSESSMENT DOCUMENTED: ICD-10-PCS | Mod: HCNC,CPTII,S$GLB, | Performed by: STUDENT IN AN ORGANIZED HEALTH CARE EDUCATION/TRAINING PROGRAM

## 2023-09-08 PROCEDURE — 1101F PT FALLS ASSESS-DOCD LE1/YR: CPT | Mod: HCNC,CPTII,S$GLB, | Performed by: STUDENT IN AN ORGANIZED HEALTH CARE EDUCATION/TRAINING PROGRAM

## 2023-09-08 PROCEDURE — 4010F PR ACE/ARB THEARPY RXD/TAKEN: ICD-10-PCS | Mod: HCNC,CPTII,S$GLB, | Performed by: STUDENT IN AN ORGANIZED HEALTH CARE EDUCATION/TRAINING PROGRAM

## 2023-09-08 PROCEDURE — 3008F PR BODY MASS INDEX (BMI) DOCUMENTED: ICD-10-PCS | Mod: HCNC,CPTII,S$GLB, | Performed by: STUDENT IN AN ORGANIZED HEALTH CARE EDUCATION/TRAINING PROGRAM

## 2023-09-08 PROCEDURE — 3077F SYST BP >= 140 MM HG: CPT | Mod: HCNC,CPTII,S$GLB, | Performed by: STUDENT IN AN ORGANIZED HEALTH CARE EDUCATION/TRAINING PROGRAM

## 2023-09-08 PROCEDURE — 3077F PR MOST RECENT SYSTOLIC BLOOD PRESSURE >= 140 MM HG: ICD-10-PCS | Mod: HCNC,CPTII,S$GLB, | Performed by: STUDENT IN AN ORGANIZED HEALTH CARE EDUCATION/TRAINING PROGRAM

## 2023-09-08 RX ORDER — LISINOPRIL 20 MG/1
20 TABLET ORAL DAILY
Qty: 90 TABLET | Refills: 3 | Status: SHIPPED | OUTPATIENT
Start: 2023-09-08 | End: 2023-10-16 | Stop reason: SDUPTHER

## 2023-09-08 RX ORDER — SEMAGLUTIDE 0.68 MG/ML
INJECTION, SOLUTION SUBCUTANEOUS
Qty: 3 ML | Refills: 0 | Status: SHIPPED | OUTPATIENT
Start: 2023-09-08 | End: 2023-10-16 | Stop reason: SDUPTHER

## 2023-09-08 NOTE — PROGRESS NOTES
Subjective:       Patient ID: Jordan Mcpherson is a 67 y.o. male.    Chief Complaint: Annual Exam    HPI:  67 y.o. male presents to [facility] for annual exam    Acute concerns?: Patient would like a rollator to assist with prolonged walker. Has BKA and needs assistance with ambulation.      Most recent HbA1c: 8.6% 8/4/2023  Home Reads?: Not checking as machine is broken. Needs follow-up with Supriya Rowell.  Current Meds: empagliflozen 25 mg, metformin 1,000 mg bid, lixsenatide 8 mcg  On Insulin?: Yes, insulin glargine 24 U daily, insulin aspart SSI  Compliance: Hasn't been taking insulin for some time. Sugar is about 240. Has not been using insulin as he is tired of sticking himself. Has not taken in about 2 years.  Diet: None, has been eating whatever he wants. Drinks tea with sugar frequently  Exercises: No regular exercise    Patient was seen in ED 7/4/2023 for chest pain that was worse with deep breathing x5 days. No recurrence since treatment in ED, no longer having concerns.    Patient is s/p bilateral BKA with PAD. Currently using Neighbor's rolaltor for ambulation.  Concerns?: Would like his own rollator. Helps greatly with mobility    HTN:  Home BP log?:  Medications: carvedilol 6.25 mg bid, lisinopril 10 mg  Compliance?: Doesn't miss if has medications  Diet?: Doesn't avoid sodium    Review of Systems   Constitutional:  Negative for chills, diaphoresis, fatigue and fever.   HENT:  Negative for congestion, sinus pressure, sneezing and sore throat.    Respiratory:  Negative for cough and shortness of breath.    Cardiovascular:  Negative for chest pain and palpitations.   Gastrointestinal:  Negative for abdominal pain, diarrhea, nausea and vomiting.   Musculoskeletal:  Negative for joint swelling and myalgias.   Skin:  Positive for wound (Had fall to concrete before visit as was braced on son's truct and moved before he had his footing and elbow hit concrete. Elbow pains is OK per patient.). Negative for  rash.   Neurological:  Positive for weakness (Feels generalized deconditioning. Has harder time to transition from place to place. Has Hover round at home. Not aalways week, only on occasion. Declines PT at this time.). Negative for numbness.       Objective:      Vitals:    09/08/23 1455   BP: (!) 160/64   BP Location: Left arm   Patient Position: Sitting   BP Method: Medium (Manual)   Pulse: 86   Resp: 18   Temp: 97.8 °F (36.6 °C)   TempSrc: Temporal   SpO2: 95%   Weight: 64.1 kg (141 lb 5 oz)   Height: 6' (1.829 m)     Physical Exam  Vitals reviewed.   Constitutional:       General: He is not in acute distress.     Appearance: Normal appearance. He is not ill-appearing.   HENT:      Head: Normocephalic and atraumatic.   Eyes:      General:         Right eye: No discharge.         Left eye: No discharge.      Conjunctiva/sclera: Conjunctivae normal.   Cardiovascular:      Rate and Rhythm: Normal rate and regular rhythm.      Heart sounds: Normal heart sounds. No murmur heard.  Pulmonary:      Effort: Pulmonary effort is normal.      Breath sounds: Normal breath sounds.   Musculoskeletal:         General: No deformity.      Cervical back: Neck supple. No rigidity.   Lymphadenopathy:      Cervical: No cervical adenopathy.   Skin:     General: Skin is warm and dry.      Coloration: Skin is not jaundiced.   Neurological:      General: No focal deficit present.      Mental Status: He is alert and oriented to person, place, and time.   Psychiatric:         Mood and Affect: Mood normal.         Behavior: Behavior normal.             Lab Results   Component Value Date     08/04/2023    K 4.2 08/04/2023     08/04/2023    CO2 21 (L) 08/04/2023    BUN 14 08/04/2023    CREATININE 1.0 08/04/2023    ANIONGAP 13 08/04/2023     Lab Results   Component Value Date    HGBA1C 8.6 (H) 08/04/2023     Lab Results   Component Value Date    BNP 42 07/04/2023    BNP 11 08/31/2021    BNP 46 05/01/2020       Lab Results    Component Value Date    WBC 13.49 (H) 08/04/2023    HGB 12.3 (L) 08/04/2023    HCT 37.3 (L) 08/04/2023    HCT 47 05/08/2022     08/04/2023    GRAN 8.9 (H) 08/04/2023    GRAN 66.3 08/04/2023     Lab Results   Component Value Date    CHOL 105 (L) 08/04/2023    HDL 38 (L) 08/04/2023    LDLCALC 41.0 (L) 08/04/2023    TRIG 130 08/04/2023          Current Outpatient Medications:     albuterol (PROVENTIL/VENTOLIN HFA) 90 mcg/actuation inhaler, Inhale 1-2 puffs into the lungs every 6 (six) hours as needed for Shortness of Breath. Rescue, Disp: 17 g, Rfl: 0    aspirin (ECOTRIN) 81 MG EC tablet, Take 81 mg by mouth once daily., Disp: , Rfl:     atorvastatin (LIPITOR) 80 MG tablet, TAKE ONE TABLET BY MOUTH DAILY, Disp: 90 tablet, Rfl: 0    baclofen (LIORESAL) 20 MG tablet, Take 20 mg by mouth 5 (five) times daily. Takes 5 times daily. Total of 120 mg, Disp: , Rfl: 1    carvediloL (COREG) 6.25 MG tablet, TAKE ONE TABLET BY MOUTH TWICE DAILY WITH MEALS, Disp: 60 tablet, Rfl: 0    clopidogreL (PLAVIX) 75 mg tablet, Take 1 tablet (75 mg total) by mouth once daily., Disp: 90 tablet, Rfl: 0    DULoxetine (CYMBALTA) 60 MG capsule, TAKE 1 CAPSULE (60 MG TOTAL) BY MOUTH ONCE DAILY., Disp: 90 capsule, Rfl: 3    empagliflozin (JARDIANCE) 25 mg tablet, Take 1 tablet (25 mg total) by mouth once daily., Disp: 90 tablet, Rfl: 3    folic acid (FOLVITE) 1 MG tablet, TAKE ONE TABLET BY MOUTH DAILY, Disp: 30 tablet, Rfl: 0    hydrocodone-acetaminophen 10-325mg (NORCO)  mg Tab, Take 1 tablet by mouth every 6 (six) hours as needed. , Disp: , Rfl: 0    metFORMIN (GLUCOPHAGE) 1000 MG tablet, Take 1 tablet (1,000 mg total) by mouth 2 (two) times daily with meals., Disp: 180 tablet, Rfl: 3    nitroGLYCERIN (NITROSTAT) 0.4 MG SL tablet, Place 1 tablet (0.4 mg total) under the tongue every 5 (five) minutes as needed for Chest pain., Disp: 14 tablet, Rfl: 6    pantoprazole (PROTONIX) 40 MG tablet, Take 1 tablet (40 mg total) by mouth once  "daily., Disp: 90 tablet, Rfl: 0    tamsulosin (FLOMAX) 0.4 mg Cap, Take 2 capsules (0.8 mg total) by mouth once daily., Disp: 60 capsule, Rfl: 11    BD BETTINA 2ND GEN PEN NEEDLE 32 gauge x 5/32" Ndle, To use with insulin injections 4 times per day (Patient not taking: Reported on 9/8/2023), Disp: 150 each, Rfl: 11    blood sugar diagnostic Strp, To check BG 4 times daily, to use with insurance preferred meter (Patient not taking: Reported on 9/8/2023), Disp: 150 each, Rfl: 11    blood-glucose meter kit, To check BG 4 times daily, to use with insurance preferred meter (Patient not taking: Reported on 9/8/2023), Disp: 1 each, Rfl: 0    lancets Misc, To check BG 4 times daily, to use with insurance preferred meter (Patient not taking: Reported on 9/8/2023), Disp: 150 each, Rfl: 11    lisinopriL (PRINIVIL,ZESTRIL) 20 MG tablet, Take 1 tablet (20 mg total) by mouth once daily., Disp: 90 tablet, Rfl: 3    semaglutide (OZEMPIC) 0.25 mg or 0.5 mg (2 mg/3 mL) pen injector, Inject 0.25 mg into the skin every 7 days for 28 days, THEN 0.5 mg every 7 days for 14 days., Disp: 3 mL, Rfl: 0        Assessment:       1. Type 2 diabetes mellitus with hyperlipidemia    2. Noncompliance with diabetes treatment    3. Moderate episode of recurrent major depressive disorder    4. Chronic diastolic congestive heart failure    5. PAD (peripheral artery disease)    6. Mucopurulent chronic bronchitis    7. S/P BKA (below knee amputation) bilateral    8. Annual physical exam    9. Benign essential HTN    10. Need for vaccination           Plan:       Annual physical exam  Type 2 diabetes mellitus with hyperlipidemia  Noncompliance with diabetes treatment  -     semaglutide (OZEMPIC) 0.25 mg or 0.5 mg (2 mg/3 mL) pen injector; Inject 0.25 mg into the skin every 7 days for 28 days, THEN 0.5 mg every 7 days for 14 days.  Dispense: 3 mL; Refill: 0  - Noncompliant with treatment plan and diet  - With near-goal A1c in August off of insulin, will " attempt initial titration of Ozempic with follow-up with Supriya Rowlel, patient amenable to once weekly injection    Chronic diastolic congestive heart failure  - Continue Ace-I, beta blocker. Keep follow-up with Cardiology    PAD (peripheral artery disease)  S/P BKA (below knee amputation) bilateral  -     WALKER FOR HOME USE  - Patient would benefit from rolling walker with ambulation safety and stamina with longer distance activities    Benign essential HTN  -     lisinopriL (PRINIVIL,ZESTRIL) 20 MG tablet; Take 1 tablet (20 mg total) by mouth once daily.  Dispense: 90 tablet; Refill: 3    Need for vaccination  -     Influenza - High Dose (65+) (PF) (IM)    RTC in 4 weeks with PCP for blood pressure check and on Ozempic

## 2023-09-08 NOTE — PROGRESS NOTES
Identified pt. By name and   Administered high dose flu vaccine to right deltoid using aseptic technique

## 2023-09-11 PROCEDURE — 90694 VACC AIIV4 NO PRSRV 0.5ML IM: CPT | Mod: HCNC,S$GLB,, | Performed by: STUDENT IN AN ORGANIZED HEALTH CARE EDUCATION/TRAINING PROGRAM

## 2023-09-11 PROCEDURE — G0008 FLU VACCINE - QUADRIVALENT - ADJUVANTED: ICD-10-PCS | Mod: HCNC,S$GLB,, | Performed by: STUDENT IN AN ORGANIZED HEALTH CARE EDUCATION/TRAINING PROGRAM

## 2023-09-11 PROCEDURE — 90694 FLU VACCINE - QUADRIVALENT - ADJUVANTED: ICD-10-PCS | Mod: HCNC,S$GLB,, | Performed by: STUDENT IN AN ORGANIZED HEALTH CARE EDUCATION/TRAINING PROGRAM

## 2023-09-11 PROCEDURE — G0008 ADMIN INFLUENZA VIRUS VAC: HCPCS | Mod: HCNC,S$GLB,, | Performed by: STUDENT IN AN ORGANIZED HEALTH CARE EDUCATION/TRAINING PROGRAM

## 2023-09-17 DIAGNOSIS — N40.0 BENIGN PROSTATIC HYPERPLASIA, UNSPECIFIED WHETHER LOWER URINARY TRACT SYMPTOMS PRESENT: ICD-10-CM

## 2023-09-19 RX ORDER — TAMSULOSIN HYDROCHLORIDE 0.4 MG/1
2 CAPSULE ORAL
Qty: 60 CAPSULE | Refills: 5 | Status: SHIPPED | OUTPATIENT
Start: 2023-09-19 | End: 2024-03-12

## 2023-10-02 ENCOUNTER — PATIENT OUTREACH (OUTPATIENT)
Dept: ADMINISTRATIVE | Facility: HOSPITAL | Age: 68
End: 2023-10-02
Payer: MEDICARE

## 2023-10-02 NOTE — PROGRESS NOTES
Health Maintenance Due   Topic Date Due    Pneumococcal Vaccines (Age 65+) (2 - PCV) 07/11/2019    COVID-19 Vaccine (4 - Pfizer series) 12/03/2021    Shingles Vaccine (2 of 2) 03/08/2022    Eye Exam  03/16/2022    Abdominal Aortic Aneurysm Screening  Never done    Colorectal Cancer Screening  12/13/2023        Chart review done.   HM updated.   Immunizations reviewed & updated.   Care Everywhere updated.

## 2023-10-06 NOTE — ASSESSMENT & PLAN NOTE
D/w pt to use mucinex and ceftin sent to pharmacy, if not better set up appt   Optimize BG readings.   See above.

## 2023-10-16 ENCOUNTER — OFFICE VISIT (OUTPATIENT)
Dept: PRIMARY CARE CLINIC | Facility: CLINIC | Age: 68
End: 2023-10-16
Payer: MEDICARE

## 2023-10-16 ENCOUNTER — DOCUMENTATION ONLY (OUTPATIENT)
Dept: SURGERY | Facility: CLINIC | Age: 68
End: 2023-10-16
Payer: MEDICARE

## 2023-10-16 ENCOUNTER — TELEPHONE (OUTPATIENT)
Dept: CARDIOLOGY | Facility: CLINIC | Age: 68
End: 2023-10-16
Payer: MEDICARE

## 2023-10-16 ENCOUNTER — TELEPHONE (OUTPATIENT)
Dept: SURGERY | Facility: CLINIC | Age: 68
End: 2023-10-16
Payer: MEDICARE

## 2023-10-16 VITALS
HEART RATE: 80 BPM | OXYGEN SATURATION: 97 % | RESPIRATION RATE: 18 BRPM | SYSTOLIC BLOOD PRESSURE: 158 MMHG | WEIGHT: 139.44 LBS | TEMPERATURE: 97 F | HEIGHT: 72 IN | DIASTOLIC BLOOD PRESSURE: 70 MMHG | BODY MASS INDEX: 18.89 KG/M2

## 2023-10-16 DIAGNOSIS — J41.1 MUCOPURULENT CHRONIC BRONCHITIS: ICD-10-CM

## 2023-10-16 DIAGNOSIS — Z12.11 ENCOUNTER FOR COLORECTAL CANCER SCREENING: ICD-10-CM

## 2023-10-16 DIAGNOSIS — E11.69 TYPE 2 DIABETES MELLITUS WITH HYPERLIPIDEMIA: ICD-10-CM

## 2023-10-16 DIAGNOSIS — I10 BENIGN ESSENTIAL HTN: ICD-10-CM

## 2023-10-16 DIAGNOSIS — C44.609 SKIN CANCER OF ARM, LEFT: Primary | ICD-10-CM

## 2023-10-16 DIAGNOSIS — E78.5 TYPE 2 DIABETES MELLITUS WITH HYPERLIPIDEMIA: ICD-10-CM

## 2023-10-16 DIAGNOSIS — Z79.4 TYPE 2 DIABETES MELLITUS WITH HYPERGLYCEMIA, WITH LONG-TERM CURRENT USE OF INSULIN: ICD-10-CM

## 2023-10-16 DIAGNOSIS — F33.1 MODERATE EPISODE OF RECURRENT MAJOR DEPRESSIVE DISORDER: ICD-10-CM

## 2023-10-16 DIAGNOSIS — E11.65 TYPE 2 DIABETES MELLITUS WITH HYPERGLYCEMIA, WITH LONG-TERM CURRENT USE OF INSULIN: ICD-10-CM

## 2023-10-16 DIAGNOSIS — Z23 NEED FOR VACCINATION: ICD-10-CM

## 2023-10-16 DIAGNOSIS — K14.1 GEOGRAPHIC TONGUE: ICD-10-CM

## 2023-10-16 DIAGNOSIS — Z13.6 ENCOUNTER FOR ABDOMINAL AORTIC ANEURYSM (AAA) SCREENING: ICD-10-CM

## 2023-10-16 DIAGNOSIS — Z12.12 ENCOUNTER FOR COLORECTAL CANCER SCREENING: ICD-10-CM

## 2023-10-16 PROCEDURE — 1160F RVW MEDS BY RX/DR IN RCRD: CPT | Mod: HCNC,CPTII,S$GLB, | Performed by: FAMILY MEDICINE

## 2023-10-16 PROCEDURE — 3008F PR BODY MASS INDEX (BMI) DOCUMENTED: ICD-10-PCS | Mod: HCNC,CPTII,S$GLB, | Performed by: FAMILY MEDICINE

## 2023-10-16 PROCEDURE — 3062F PR POS MACROALBUMINURIA RESULT DOCUMENTED/REVIEW: ICD-10-PCS | Mod: HCNC,CPTII,S$GLB, | Performed by: FAMILY MEDICINE

## 2023-10-16 PROCEDURE — 1159F MED LIST DOCD IN RCRD: CPT | Mod: HCNC,CPTII,S$GLB, | Performed by: FAMILY MEDICINE

## 2023-10-16 PROCEDURE — 1100F PTFALLS ASSESS-DOCD GE2>/YR: CPT | Mod: HCNC,CPTII,S$GLB, | Performed by: FAMILY MEDICINE

## 2023-10-16 PROCEDURE — 3288F FALL RISK ASSESSMENT DOCD: CPT | Mod: HCNC,CPTII,S$GLB, | Performed by: FAMILY MEDICINE

## 2023-10-16 PROCEDURE — 4010F PR ACE/ARB THEARPY RXD/TAKEN: ICD-10-PCS | Mod: HCNC,CPTII,S$GLB, | Performed by: FAMILY MEDICINE

## 2023-10-16 PROCEDURE — 1126F AMNT PAIN NOTED NONE PRSNT: CPT | Mod: HCNC,CPTII,S$GLB, | Performed by: FAMILY MEDICINE

## 2023-10-16 PROCEDURE — 90677 PCV20 VACCINE IM: CPT | Mod: HCNC,S$GLB,, | Performed by: FAMILY MEDICINE

## 2023-10-16 PROCEDURE — 1159F PR MEDICATION LIST DOCUMENTED IN MEDICAL RECORD: ICD-10-PCS | Mod: HCNC,CPTII,S$GLB, | Performed by: FAMILY MEDICINE

## 2023-10-16 PROCEDURE — 90677 PNEUMOCOCCAL CONJUGATE VACCINE 20-VALENT: ICD-10-PCS | Mod: HCNC,S$GLB,, | Performed by: FAMILY MEDICINE

## 2023-10-16 PROCEDURE — 99999 PR PBB SHADOW E&M-EST. PATIENT-LVL V: ICD-10-PCS | Mod: PBBFAC,HCNC,, | Performed by: FAMILY MEDICINE

## 2023-10-16 PROCEDURE — 3077F SYST BP >= 140 MM HG: CPT | Mod: HCNC,CPTII,S$GLB, | Performed by: FAMILY MEDICINE

## 2023-10-16 PROCEDURE — 3062F POS MACROALBUMINURIA REV: CPT | Mod: HCNC,CPTII,S$GLB, | Performed by: FAMILY MEDICINE

## 2023-10-16 PROCEDURE — G0009 ADMIN PNEUMOCOCCAL VACCINE: HCPCS | Mod: HCNC,S$GLB,, | Performed by: FAMILY MEDICINE

## 2023-10-16 PROCEDURE — 99999 PR PBB SHADOW E&M-EST. PATIENT-LVL V: CPT | Mod: PBBFAC,HCNC,, | Performed by: FAMILY MEDICINE

## 2023-10-16 PROCEDURE — 99214 OFFICE O/P EST MOD 30 MIN: CPT | Mod: HCNC,S$GLB,, | Performed by: FAMILY MEDICINE

## 2023-10-16 PROCEDURE — 1100F PR PT FALLS ASSESS DOC 2+ FALLS/FALL W/INJURY/YR: ICD-10-PCS | Mod: HCNC,CPTII,S$GLB, | Performed by: FAMILY MEDICINE

## 2023-10-16 PROCEDURE — 1126F PR PAIN SEVERITY QUANTIFIED, NO PAIN PRESENT: ICD-10-PCS | Mod: HCNC,CPTII,S$GLB, | Performed by: FAMILY MEDICINE

## 2023-10-16 PROCEDURE — 3052F PR MOST RECENT HEMOGLOBIN A1C LEVEL 8.0 - < 9.0%: ICD-10-PCS | Mod: HCNC,CPTII,S$GLB, | Performed by: FAMILY MEDICINE

## 2023-10-16 PROCEDURE — 3066F PR DOCUMENTATION OF TREATMENT FOR NEPHROPATHY: ICD-10-PCS | Mod: HCNC,CPTII,S$GLB, | Performed by: FAMILY MEDICINE

## 2023-10-16 PROCEDURE — 3052F HG A1C>EQUAL 8.0%<EQUAL 9.0%: CPT | Mod: HCNC,CPTII,S$GLB, | Performed by: FAMILY MEDICINE

## 2023-10-16 PROCEDURE — 3008F BODY MASS INDEX DOCD: CPT | Mod: HCNC,CPTII,S$GLB, | Performed by: FAMILY MEDICINE

## 2023-10-16 PROCEDURE — 1160F PR REVIEW ALL MEDS BY PRESCRIBER/CLIN PHARMACIST DOCUMENTED: ICD-10-PCS | Mod: HCNC,CPTII,S$GLB, | Performed by: FAMILY MEDICINE

## 2023-10-16 PROCEDURE — 3288F PR FALLS RISK ASSESSMENT DOCUMENTED: ICD-10-PCS | Mod: HCNC,CPTII,S$GLB, | Performed by: FAMILY MEDICINE

## 2023-10-16 PROCEDURE — 3077F PR MOST RECENT SYSTOLIC BLOOD PRESSURE >= 140 MM HG: ICD-10-PCS | Mod: HCNC,CPTII,S$GLB, | Performed by: FAMILY MEDICINE

## 2023-10-16 PROCEDURE — 3066F NEPHROPATHY DOC TX: CPT | Mod: HCNC,CPTII,S$GLB, | Performed by: FAMILY MEDICINE

## 2023-10-16 PROCEDURE — 4010F ACE/ARB THERAPY RXD/TAKEN: CPT | Mod: HCNC,CPTII,S$GLB, | Performed by: FAMILY MEDICINE

## 2023-10-16 PROCEDURE — 3078F DIAST BP <80 MM HG: CPT | Mod: HCNC,CPTII,S$GLB, | Performed by: FAMILY MEDICINE

## 2023-10-16 PROCEDURE — 3078F PR MOST RECENT DIASTOLIC BLOOD PRESSURE < 80 MM HG: ICD-10-PCS | Mod: HCNC,CPTII,S$GLB, | Performed by: FAMILY MEDICINE

## 2023-10-16 PROCEDURE — G0009 PNEUMOCOCCAL CONJUGATE VACCINE 20-VALENT: ICD-10-PCS | Mod: HCNC,S$GLB,, | Performed by: FAMILY MEDICINE

## 2023-10-16 PROCEDURE — 99214 PR OFFICE/OUTPT VISIT, EST, LEVL IV, 30-39 MIN: ICD-10-PCS | Mod: HCNC,S$GLB,, | Performed by: FAMILY MEDICINE

## 2023-10-16 RX ORDER — EMPAGLIFLOZIN 25 MG/1
25 TABLET, FILM COATED ORAL
Qty: 90 TABLET | Refills: 1 | Status: SHIPPED | OUTPATIENT
Start: 2023-10-16 | End: 2024-04-02

## 2023-10-16 RX ORDER — SEMAGLUTIDE 1.34 MG/ML
1 INJECTION, SOLUTION SUBCUTANEOUS
Qty: 3 ML | Refills: 5 | Status: SHIPPED | OUTPATIENT
Start: 2023-12-11

## 2023-10-16 RX ORDER — SEMAGLUTIDE 0.68 MG/ML
INJECTION, SOLUTION SUBCUTANEOUS
Qty: 6 ML | Refills: 0 | Status: SHIPPED | OUTPATIENT
Start: 2023-10-16 | End: 2023-12-11

## 2023-10-16 RX ORDER — METFORMIN HYDROCHLORIDE 1000 MG/1
1000 TABLET ORAL 2 TIMES DAILY WITH MEALS
Qty: 180 TABLET | Refills: 1 | Status: SHIPPED | OUTPATIENT
Start: 2023-10-16 | End: 2024-04-02

## 2023-10-16 RX ORDER — ESCITALOPRAM OXALATE 10 MG/1
10 TABLET ORAL DAILY
Qty: 90 TABLET | Refills: 3 | Status: SHIPPED | OUTPATIENT
Start: 2023-10-16 | End: 2023-10-16

## 2023-10-16 RX ORDER — LISINOPRIL 20 MG/1
20 TABLET ORAL DAILY
Qty: 90 TABLET | Refills: 3 | Status: SHIPPED | OUTPATIENT
Start: 2023-10-16

## 2023-10-16 RX ORDER — SODIUM PICOSULFATE, MAGNESIUM OXIDE, AND ANHYDROUS CITRIC ACID 12; 3.5; 1 G/175ML; G/175ML; MG/175ML
175 LIQUID ORAL SEE ADMIN INSTRUCTIONS
Qty: 350 ML | Refills: 0 | Status: SHIPPED | OUTPATIENT
Start: 2023-10-16

## 2023-10-16 NOTE — TELEPHONE ENCOUNTER
----- Message from Jared Holland LPN sent at 10/16/2023  4:03 PM CDT -----  Regarding: Cardiac C learance/Colonoscopy  Carol Bowie LPN  Cc:Shaina Velez MA  - The attached patient has been scheduled to have a Colonoscopy at [facility] on 12/18/2023. The patient is not established with a treating  Cardiologist at this time. However the patient did express the desire to get established with one of the staff Cardiologist at [facility]. A review of this patient's medical HX, denotes an extensive Hx of Cardiovascular complications that will need to be addressed for this patient to proceed with the Colonoscopy. The patient is also noted to be on Anticoagulant Therapy. Please assist with getting this patient established with one of our Cardiologist,  to obtain a Cardiac Clearance . Additionally to provide this patient with optimal patient care.  Thanks,  Jared

## 2023-10-16 NOTE — TELEPHONE ENCOUNTER
The patient has been advised the Colonoscopy Prep Kit will be ordered from the patient's verified preferred pharmacy on file. The medication can  be picked up by the patient, or the patient's designated representative.The patient was further explained the Colonoscopy Prep instructions will be mailed to the patient verified mailing address on file, or put onto the [employer] portal, whichever method of delivery the patient prefers.Additionally this patient was informed,not to follow the instructions that comes with the bowel prep medication. However, the patient was instructed to please follow the Colonoscopy Bowel Prep instructions that's being provided by the . The patient was asked to please to follow the Colonoscopy Prep instructions being provided as precisely,and  meticulously as possible.The patient was advised you  will receive a follow up phone call to summarize the Colonoscopy Prep instructions prior to the scheduled Colonoscopy procedure date. At this time the patient will be given an opportunity to ask any questions regarding the Colonoscopy procedure, and it's associated Bowel Prep instructions.

## 2023-10-16 NOTE — PROGRESS NOTES
Carol Bowie LPN  Cc:Shaina Velez MA  - The attached patient has been scheduled to have a Colonoscopy at [facility] on 12/18/2023. The patient is not established with a treating  Cardiologist at this time. However the patient did express the desire to get established with one of the staff Cardiologist at [facility]. A review of this patient's medical HX, denotes an extensive Hx of Cardiovascular complications that will need to be addressed for this patient to proceed with the Colonoscopy. The patient is also noted to be on Anticoagulant Therapy. Please assist with getting this patient established with one of our Cardiologist,  to obtain a Cardiac Clearance . Additionally to provide this patient with optimal patient care.  Thanks,  Jared

## 2023-10-16 NOTE — TELEPHONE ENCOUNTER
Spoke with patient, scheduled first available provider per patient request.  Appointment reminder mailed to patient, patient verbalized understanding.

## 2023-10-16 NOTE — PROGRESS NOTES
Subjective:       Patient ID: Jordan Mcpherson is a 67 y.o. male.    Chief Complaint: Follow-up (4 week follow up appt/BP check)    Core higher dose of lisinopril after last visit, says prescriptions were sent to the wrong pharmacy.  He is not been checking his blood pressure regularly.  Denies chest pain or increased shortness of breath.  Continues to smoke heavily, not interested in quitting.  Has a large skin cancer to his left upper arm, confirmed by biopsy, says previous dermatologist he was seeing was supposed to set him up for surgery, but the provider moved and he is not followed up with anyone about this.  Says the lesion is getting larger, sometimes gets irritated      Review of Systems   Constitutional:  Negative for fever.   HENT:  Positive for mouth sores. Negative for trouble swallowing.    Respiratory:  Negative for shortness of breath.    Cardiovascular:  Negative for chest pain.   Skin:  Positive for wound.   Psychiatric/Behavioral:  Negative for agitation.        Objective:      Vitals:    10/16/23 1429   BP: (!) 158/70   BP Location: Right arm   Patient Position: Sitting   BP Method: Medium (Manual)   Pulse: 80   Resp: 18   Temp: 97.3 °F (36.3 °C)   TempSrc: Temporal   SpO2: 97%   Weight: 63.3 kg (139 lb 7.1 oz)   Height: 6' (1.829 m)     BP Readings from Last 5 Encounters:   10/16/23 (!) 158/70   09/08/23 (!) 160/70   07/04/23 (!) 165/76   10/19/22 126/74   10/14/22 134/74     Wt Readings from Last 5 Encounters:   10/16/23 63.3 kg (139 lb 7.1 oz)   09/08/23 64.1 kg (141 lb 5 oz)   07/04/23 65.8 kg (145 lb)   10/19/22 65.2 kg (143 lb 13.6 oz)   10/14/22 66.2 kg (146 lb)     Physical Exam  Vitals and nursing note reviewed.   Constitutional:       General: He is not in acute distress.     Appearance: Normal appearance. He is well-developed.   HENT:      Head: Normocephalic and atraumatic.      Mouth/Throat:      Tongue: Lesions present.   Cardiovascular:      Rate and Rhythm: Normal rate and regular  rhythm.      Heart sounds: Normal heart sounds.   Pulmonary:      Effort: Pulmonary effort is normal.      Breath sounds: Normal breath sounds.   Skin:     General: Skin is warm and dry.          Neurological:      Mental Status: He is alert and oriented to person, place, and time.   Psychiatric:         Mood and Affect: Mood normal.         Behavior: Behavior normal.         Lab Results   Component Value Date    WBC 13.49 (H) 08/04/2023    HGB 12.3 (L) 08/04/2023    HCT 37.3 (L) 08/04/2023     08/04/2023    CHOL 105 (L) 08/04/2023    TRIG 130 08/04/2023    HDL 38 (L) 08/04/2023    ALT 6 (L) 08/04/2023    AST 6 (L) 08/04/2023     08/04/2023    K 4.2 08/04/2023     08/04/2023    CREATININE 1.0 08/04/2023    BUN 14 08/04/2023    CO2 21 (L) 08/04/2023    TSH 1.98 07/13/2018    PSA 0.56 08/04/2023    HGBA1C 8.6 (H) 08/04/2023            Assessment:       1. Skin cancer of arm, left    2. Need for vaccination    3. Encounter for abdominal aortic aneurysm (AAA) screening    4. Encounter for colorectal cancer screening    5. Moderate episode of recurrent major depressive disorder    6. Mucopurulent chronic bronchitis    7. Geographic tongue    8. Benign essential HTN    9. Type 2 diabetes mellitus with hyperlipidemia        Plan:       Skin cancer of arm, left  -     Ambulatory referral/consult to Dermatology; Future; Expected date: 10/23/2023  Needs follow-up with dermatology ASAP  Need for vaccination  -     Pneumococcal Conjugate Vaccine (20 Valent) (IM)(Preferred)    Encounter for abdominal aortic aneurysm (AAA) screening  -      AAA Screening; Future; Expected date: 10/16/2023    Encounter for colorectal cancer screening  -     Case Request Endoscopy: COLONOSCOPY    Moderate episode of recurrent major depressive disorder  Stable on duloxetine    Mucopurulent chronic bronchitis  Encouraged to quit smoking  Geographic tongue  -     Ambulatory referral/consult to ENT; Future; Expected date:  10/23/2023    Benign essential HTN  -     lisinopriL (PRINIVIL,ZESTRIL) 20 MG tablet; Take 1 tablet (20 mg total) by mouth once daily.  Dispense: 90 tablet; Refill: 3  Blood pressure suboptimally controlled.  Increase lisinopril to 20 mg daily.  Return to clinic in 2 weeks for blood pressure check  Type 2 diabetes mellitus with hyperlipidemia  -     semaglutide (OZEMPIC) 0.25 mg or 0.5 mg (2 mg/3 mL) pen injector; Inject 0.25 mg into the skin every 7 days for 28 days, THEN 0.5 mg every 7 days.  Dispense: 6 mL; Refill: 0  -     semaglutide (OZEMPIC) 1 mg/dose (4 mg/3 mL); Inject 1 mg into the skin every 7 days.  Dispense: 3 mL; Refill: 5  -     Ambulatory referral/consult to Endocrinology; Future; Expected date: 10/23/2023  Needs much tighter glycemic control.  Start Ozempic, refer to diabetes management    Medication List with Changes/Refills   New Medications    SEMAGLUTIDE (OZEMPIC) 1 MG/DOSE (4 MG/3 ML)    Inject 1 mg into the skin every 7 days.   Current Medications    ALBUTEROL (PROVENTIL/VENTOLIN HFA) 90 MCG/ACTUATION INHALER    Inhale 1-2 puffs into the lungs every 6 (six) hours as needed for Shortness of Breath. Rescue    ASPIRIN (ECOTRIN) 81 MG EC TABLET    Take 81 mg by mouth once daily.    ATORVASTATIN (LIPITOR) 80 MG TABLET    TAKE ONE TABLET BY MOUTH DAILY    BACLOFEN (LIORESAL) 20 MG TABLET    Take 20 mg by mouth 5 (five) times daily. Takes 5 times daily. Total of 120 mg    CARVEDILOL (COREG) 6.25 MG TABLET    TAKE ONE TABLET BY MOUTH TWICE DAILY WITH MEALS    CLOPIDOGREL (PLAVIX) 75 MG TABLET    Take 1 tablet (75 mg total) by mouth once daily.    DULOXETINE (CYMBALTA) 60 MG CAPSULE    TAKE 1 CAPSULE (60 MG TOTAL) BY MOUTH ONCE DAILY.    EMPAGLIFLOZIN (JARDIANCE) 25 MG TABLET    Take 1 tablet (25 mg total) by mouth once daily.    FOLIC ACID (FOLVITE) 1 MG TABLET    TAKE ONE TABLET BY MOUTH DAILY    HYDROCODONE-ACETAMINOPHEN 10-325MG (NORCO)  MG TAB    Take 1 tablet by mouth every 6 (six) hours as  "needed.     METFORMIN (GLUCOPHAGE) 1000 MG TABLET    Take 1 tablet (1,000 mg total) by mouth 2 (two) times daily with meals.    NITROGLYCERIN (NITROSTAT) 0.4 MG SL TABLET    Place 1 tablet (0.4 mg total) under the tongue every 5 (five) minutes as needed for Chest pain.    PANTOPRAZOLE (PROTONIX) 40 MG TABLET    Take 1 tablet (40 mg total) by mouth once daily.    TAMSULOSIN (FLOMAX) 0.4 MG CAP    TAKE TWO CAPSULES BY MOUTH DAILY   Changed and/or Refilled Medications    Modified Medication Previous Medication    LISINOPRIL (PRINIVIL,ZESTRIL) 20 MG TABLET lisinopriL (PRINIVIL,ZESTRIL) 20 MG tablet       Take 1 tablet (20 mg total) by mouth once daily.    Take 1 tablet (20 mg total) by mouth once daily.    SEMAGLUTIDE (OZEMPIC) 0.25 MG OR 0.5 MG (2 MG/3 ML) PEN INJECTOR semaglutide (OZEMPIC) 0.25 mg or 0.5 mg (2 mg/3 mL) pen injector       Inject 0.25 mg into the skin every 7 days for 28 days, THEN 0.5 mg every 7 days.    Inject 0.25 mg into the skin every 7 days for 28 days, THEN 0.5 mg every 7 days for 14 days.   Discontinued Medications    BD BETTINA 2ND GEN PEN NEEDLE 32 GAUGE X 5/32" NDLE    To use with insulin injections 4 times per day    BLOOD SUGAR DIAGNOSTIC STRP    To check BG 4 times daily, to use with insurance preferred meter    BLOOD-GLUCOSE METER KIT    To check BG 4 times daily, to use with insurance preferred meter    LANCETS MISC    To check BG 4 times daily, to use with insurance preferred meter           "

## 2023-10-16 NOTE — PROGRESS NOTES
Verified pt by name and . NKDA. Per physician orders pt was administered Prevnar 20 0.5 mL  IM to right deltoid  using aseptic technique. Pt tolerated well. No adverse effects or pain reported. MD notified.

## 2023-10-16 NOTE — TELEPHONE ENCOUNTER
According to [facility] order was never faxed. Order has been printed to [facility] for review.

## 2023-10-16 NOTE — TELEPHONE ENCOUNTER
Called patient in reference to a referral to Colorectal Surgery for colon cancer screening. Patient verbally consented to a Colonoscopy and requested to be scheduled for a Colonoscopy on 12/18/2023 - Pending Cardiac Clearance - Anticoagulant Therapy in use. Patient was advised a designated  is required on the day of the Colonoscopy to drive the patient home and the  must be at least. 18 years old.Colonoscopy Prep instructions were thoroughly explained and discussed with the patient.It was emphasized, and reiterated to the patient, to please not to follow the bowel prep instructions that comes with the bowel prep package.However, to please follow the prep instructions that will be received in the mail,or via the [employer] portal, or by both modes of delivery, which ever method of delivery the patient prefers,from the [facility] LPN   Patient acknowledges understanding Prep instructions as explained and discussed on the phone.. Patient was advised the Colonoscopy Prep instructions discussed and explained on the phone,are being mailed out to the patient's verified address on file,or put onto the [employer] portal,or both methods of delivery, whichever the patient prefers. Patient's address on file was verified with the patient for accuracy of mailing. Patient's medications on file was reviewed with the patient for accuracy of information. Patient denies taking  any other medications other than those listed and verified on medication profile.Patient was explained the Colonoscopy will be performed here at [facility]. Patient was further explained the Pre-Op will call one day prior to the procedure date, to discuss Pre-Op instructions;and what time to report for the Colonoscopy. The patient was given the opportunity to ask any questions about the Colonoscopy. No further issues were discussed.

## 2023-10-16 NOTE — TELEPHONE ENCOUNTER
No care due was identified.  [facility] Embedded Care Due Messages. Reference number: [id number].   10/16/2023 8:00:48 AM CDT

## 2023-10-16 NOTE — TELEPHONE ENCOUNTER
Refill Decision Note   Jordan Mcpherson  is requesting a refill authorization.  Brief Assessment and Rationale for Refill:  Approve     Medication Therapy Plan:         Comments:     Note composed:3:58 PM 10/16/2023

## 2023-10-16 NOTE — TELEPHONE ENCOUNTER
----- Message from Mike Morley MD sent at 10/16/2023  2:54 PM CDT -----  Dr. Felipe ordered rolling walker at last visit. Please check on status of this

## 2023-10-17 ENCOUNTER — OFFICE VISIT (OUTPATIENT)
Dept: DIABETES | Facility: CLINIC | Age: 68
End: 2023-10-17
Payer: MEDICARE

## 2023-10-17 VITALS
SYSTOLIC BLOOD PRESSURE: 129 MMHG | HEART RATE: 81 BPM | HEIGHT: 72 IN | BODY MASS INDEX: 18.83 KG/M2 | OXYGEN SATURATION: 96 % | DIASTOLIC BLOOD PRESSURE: 74 MMHG | WEIGHT: 139 LBS

## 2023-10-17 DIAGNOSIS — I73.9 PAD (PERIPHERAL ARTERY DISEASE): ICD-10-CM

## 2023-10-17 DIAGNOSIS — F33.1 MODERATE EPISODE OF RECURRENT MAJOR DEPRESSIVE DISORDER: ICD-10-CM

## 2023-10-17 DIAGNOSIS — Z89.512 S/P BKA (BELOW KNEE AMPUTATION) BILATERAL: ICD-10-CM

## 2023-10-17 DIAGNOSIS — Z71.9 HEALTH EDUCATION/COUNSELING: ICD-10-CM

## 2023-10-17 DIAGNOSIS — E11.69 TYPE 2 DIABETES MELLITUS WITH HYPERLIPIDEMIA: ICD-10-CM

## 2023-10-17 DIAGNOSIS — Z95.5 S/P DRUG ELUTING CORONARY STENT PLACEMENT: ICD-10-CM

## 2023-10-17 DIAGNOSIS — E11.65 TYPE 2 DIABETES MELLITUS WITH HYPERGLYCEMIA, WITH LONG-TERM CURRENT USE OF INSULIN: Primary | ICD-10-CM

## 2023-10-17 DIAGNOSIS — E78.5 TYPE 2 DIABETES MELLITUS WITH HYPERLIPIDEMIA: ICD-10-CM

## 2023-10-17 DIAGNOSIS — I25.10 CORONARY ARTERY DISEASE INVOLVING NATIVE CORONARY ARTERY OF NATIVE HEART WITHOUT ANGINA PECTORIS: ICD-10-CM

## 2023-10-17 DIAGNOSIS — Z72.0 TOBACCO ABUSE: ICD-10-CM

## 2023-10-17 DIAGNOSIS — E11.29 MICROALBUMINURIA DUE TO TYPE 2 DIABETES MELLITUS: ICD-10-CM

## 2023-10-17 DIAGNOSIS — G62.9 NEUROPATHY: ICD-10-CM

## 2023-10-17 DIAGNOSIS — R80.9 MICROALBUMINURIA DUE TO TYPE 2 DIABETES MELLITUS: ICD-10-CM

## 2023-10-17 DIAGNOSIS — Z79.4 TYPE 2 DIABETES MELLITUS WITH HYPERGLYCEMIA, WITH LONG-TERM CURRENT USE OF INSULIN: Primary | ICD-10-CM

## 2023-10-17 DIAGNOSIS — Z79.4 TYPE 2 DIABETES MELLITUS WITH BOTH EYES AFFECTED BY MILD NONPROLIFERATIVE RETINOPATHY WITHOUT MACULAR EDEMA, WITH LONG-TERM CURRENT USE OF INSULIN: ICD-10-CM

## 2023-10-17 DIAGNOSIS — I10 ESSENTIAL HYPERTENSION: ICD-10-CM

## 2023-10-17 DIAGNOSIS — Z89.511 S/P BKA (BELOW KNEE AMPUTATION) BILATERAL: ICD-10-CM

## 2023-10-17 DIAGNOSIS — E11.3293 TYPE 2 DIABETES MELLITUS WITH BOTH EYES AFFECTED BY MILD NONPROLIFERATIVE RETINOPATHY WITHOUT MACULAR EDEMA, WITH LONG-TERM CURRENT USE OF INSULIN: ICD-10-CM

## 2023-10-17 DIAGNOSIS — E78.5 HYPERLIPIDEMIA, UNSPECIFIED HYPERLIPIDEMIA TYPE: ICD-10-CM

## 2023-10-17 PROCEDURE — 99999 PR PBB SHADOW E&M-EST. PATIENT-LVL V: CPT | Mod: PBBFAC,HCNC,, | Performed by: NURSE PRACTITIONER

## 2023-10-17 PROCEDURE — 3052F HG A1C>EQUAL 8.0%<EQUAL 9.0%: CPT | Mod: HCNC,CPTII,S$GLB, | Performed by: NURSE PRACTITIONER

## 2023-10-17 PROCEDURE — 1160F PR REVIEW ALL MEDS BY PRESCRIBER/CLIN PHARMACIST DOCUMENTED: ICD-10-PCS | Mod: HCNC,CPTII,S$GLB, | Performed by: NURSE PRACTITIONER

## 2023-10-17 PROCEDURE — 1125F PR PAIN SEVERITY QUANTIFIED, PAIN PRESENT: ICD-10-PCS | Mod: HCNC,CPTII,S$GLB, | Performed by: NURSE PRACTITIONER

## 2023-10-17 PROCEDURE — 99999 PR PBB SHADOW E&M-EST. PATIENT-LVL V: ICD-10-PCS | Mod: PBBFAC,HCNC,, | Performed by: NURSE PRACTITIONER

## 2023-10-17 PROCEDURE — 99214 PR OFFICE/OUTPT VISIT, EST, LEVL IV, 30-39 MIN: ICD-10-PCS | Mod: HCNC,S$GLB,, | Performed by: NURSE PRACTITIONER

## 2023-10-17 PROCEDURE — 1159F MED LIST DOCD IN RCRD: CPT | Mod: HCNC,CPTII,S$GLB, | Performed by: NURSE PRACTITIONER

## 2023-10-17 PROCEDURE — 99214 OFFICE O/P EST MOD 30 MIN: CPT | Mod: HCNC,S$GLB,, | Performed by: NURSE PRACTITIONER

## 2023-10-17 PROCEDURE — 3062F POS MACROALBUMINURIA REV: CPT | Mod: HCNC,CPTII,S$GLB, | Performed by: NURSE PRACTITIONER

## 2023-10-17 PROCEDURE — 4010F PR ACE/ARB THEARPY RXD/TAKEN: ICD-10-PCS | Mod: HCNC,CPTII,S$GLB, | Performed by: NURSE PRACTITIONER

## 2023-10-17 PROCEDURE — 4010F ACE/ARB THERAPY RXD/TAKEN: CPT | Mod: HCNC,CPTII,S$GLB, | Performed by: NURSE PRACTITIONER

## 2023-10-17 PROCEDURE — 3008F BODY MASS INDEX DOCD: CPT | Mod: HCNC,CPTII,S$GLB, | Performed by: NURSE PRACTITIONER

## 2023-10-17 PROCEDURE — 3008F PR BODY MASS INDEX (BMI) DOCUMENTED: ICD-10-PCS | Mod: HCNC,CPTII,S$GLB, | Performed by: NURSE PRACTITIONER

## 2023-10-17 PROCEDURE — 1159F PR MEDICATION LIST DOCUMENTED IN MEDICAL RECORD: ICD-10-PCS | Mod: HCNC,CPTII,S$GLB, | Performed by: NURSE PRACTITIONER

## 2023-10-17 PROCEDURE — 3062F PR POS MACROALBUMINURIA RESULT DOCUMENTED/REVIEW: ICD-10-PCS | Mod: HCNC,CPTII,S$GLB, | Performed by: NURSE PRACTITIONER

## 2023-10-17 PROCEDURE — 1125F AMNT PAIN NOTED PAIN PRSNT: CPT | Mod: HCNC,CPTII,S$GLB, | Performed by: NURSE PRACTITIONER

## 2023-10-17 PROCEDURE — 3074F SYST BP LT 130 MM HG: CPT | Mod: HCNC,CPTII,S$GLB, | Performed by: NURSE PRACTITIONER

## 2023-10-17 PROCEDURE — 3078F PR MOST RECENT DIASTOLIC BLOOD PRESSURE < 80 MM HG: ICD-10-PCS | Mod: HCNC,CPTII,S$GLB, | Performed by: NURSE PRACTITIONER

## 2023-10-17 PROCEDURE — 1160F RVW MEDS BY RX/DR IN RCRD: CPT | Mod: HCNC,CPTII,S$GLB, | Performed by: NURSE PRACTITIONER

## 2023-10-17 PROCEDURE — 3078F DIAST BP <80 MM HG: CPT | Mod: HCNC,CPTII,S$GLB, | Performed by: NURSE PRACTITIONER

## 2023-10-17 PROCEDURE — 3288F PR FALLS RISK ASSESSMENT DOCUMENTED: ICD-10-PCS | Mod: HCNC,CPTII,S$GLB, | Performed by: NURSE PRACTITIONER

## 2023-10-17 PROCEDURE — 1100F PR PT FALLS ASSESS DOC 2+ FALLS/FALL W/INJURY/YR: ICD-10-PCS | Mod: HCNC,CPTII,S$GLB, | Performed by: NURSE PRACTITIONER

## 2023-10-17 PROCEDURE — 3288F FALL RISK ASSESSMENT DOCD: CPT | Mod: HCNC,CPTII,S$GLB, | Performed by: NURSE PRACTITIONER

## 2023-10-17 PROCEDURE — 3066F PR DOCUMENTATION OF TREATMENT FOR NEPHROPATHY: ICD-10-PCS | Mod: HCNC,CPTII,S$GLB, | Performed by: NURSE PRACTITIONER

## 2023-10-17 PROCEDURE — 3066F NEPHROPATHY DOC TX: CPT | Mod: HCNC,CPTII,S$GLB, | Performed by: NURSE PRACTITIONER

## 2023-10-17 PROCEDURE — 3074F PR MOST RECENT SYSTOLIC BLOOD PRESSURE < 130 MM HG: ICD-10-PCS | Mod: HCNC,CPTII,S$GLB, | Performed by: NURSE PRACTITIONER

## 2023-10-17 PROCEDURE — 3052F PR MOST RECENT HEMOGLOBIN A1C LEVEL 8.0 - < 9.0%: ICD-10-PCS | Mod: HCNC,CPTII,S$GLB, | Performed by: NURSE PRACTITIONER

## 2023-10-17 PROCEDURE — 1100F PTFALLS ASSESS-DOCD GE2>/YR: CPT | Mod: HCNC,CPTII,S$GLB, | Performed by: NURSE PRACTITIONER

## 2023-10-17 RX ORDER — LANCETS
EACH MISCELLANEOUS
Qty: 100 EACH | Refills: 11 | Status: SHIPPED | OUTPATIENT
Start: 2023-10-17

## 2023-10-17 RX ORDER — INSULIN PUMP SYRINGE, 3 ML
EACH MISCELLANEOUS
Qty: 1 EACH | Refills: 0 | Status: SHIPPED | OUTPATIENT
Start: 2023-10-17

## 2023-10-17 NOTE — PATIENT INSTRUCTIONS
Start Ozempic 0.25 mg weekly for 4 weeks & then 0.5 mg weekly thereafter.   Then can increase to 1 mg weekly     Continue Metformin and Jardiance

## 2023-10-17 NOTE — ASSESSMENT & PLAN NOTE
Uncontrolled.   + noncompliance with diabetes regimen and dietary indiscretions are hindering overall management  Discussed DM course, progression, and the need for good BG control to prevent or allay long-term complications. Reviewed proper hypoglycemia management. Discussed consistency in BG monitoring  Discussed the risk/benefits of the Jardiance- he has bilateral BKAs   Benefits likely outweigh risk       -- Medication Changes:   Start Ozempic 0.25 mg weekly for 4 weeks & then 0.5 mg weekly thereafter.   Then can increase to 1 mg weekly     Continue Metformin and Jardiance       -- Reviewed goals of therapy are to get the best control we can without hypoglycemia.  -- Reviewed patient's current insulin regimen. Clarified proper insulin dose and timing in relation to meals, etc. Insulin injection sites and proper rotation instructed.    -- Advised frequent self blood glucose monitoring.  Patient encouraged to document glucose results and bring them to every clinic visit.   monitor blood sugars 4 times per day.  RX for new meter and supplies   -- Hypoglycemia precautions discussed. Instructed on precautions before driving.    -- Call for Bg repeatedly < 90 or > 180.   -- Close adherence to lifestyle changes recommended.   -- Periodic follow ups for eye evaluations, foot care and dental care suggested.  -- He refused following up with diabetes education
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
## 2023-10-18 DIAGNOSIS — E11.9 TYPE 2 DIABETES MELLITUS WITHOUT COMPLICATION, UNSPECIFIED WHETHER LONG TERM INSULIN USE: ICD-10-CM

## 2023-10-19 RX ORDER — CARVEDILOL 6.25 MG/1
6.25 TABLET ORAL 2 TIMES DAILY WITH MEALS
Qty: 180 TABLET | Refills: 3 | Status: SHIPPED | OUTPATIENT
Start: 2023-10-19

## 2023-10-19 NOTE — TELEPHONE ENCOUNTER
No care due was identified.  [facility] Embedded Care Due Messages. Reference number: [id number].   10/19/2023 1:38:45 PM CDT

## 2023-10-19 NOTE — TELEPHONE ENCOUNTER
----- Message from Serena Bryant sent at 10/19/2023  1:26 PM CDT -----  Contact: Lorena with [facility] phone [phone]  Requesting an RX refill or new RX.  Is this a refill or new RX: Refill  RX name and strength (copy/paste from chart):  carvediloL (COREG) 6.25 MG tablet  Is this a 30 day or 90 day RX: 30  Pharmacy name and phone # (copy/paste from chart):    [facility] [place] - [address] [place] [address]  Phone: [phone] Fax: [phone]  The doctors have asked that we provide their patients with the following 2 reminders -- prescription refills can take up to 72 hours, and a friendly reminder that in the future you can use your [facility] account to request refills: N/A

## 2023-10-19 NOTE — TELEPHONE ENCOUNTER
Refill Routing Note   Medication(s) are not appropriate for processing by [facility] for the following reason(s):      New or recently adjusted medication    [facility] action(s):  Defer Care Due:  None identified            Appointments  past 12m or future 3m with PCP    Date Provider   Last Visit   10/16/2023 Mike Morley MD   Next Visit   Visit date not found Mike Morley MD   ED visits in past 90 days: 0        Note composed:1:47 PM 10/19/2023

## 2023-10-20 ENCOUNTER — TELEPHONE (OUTPATIENT)
Dept: DERMATOLOGY | Facility: CLINIC | Age: 68
End: 2023-10-20
Payer: MEDICARE

## 2023-10-20 RX ORDER — ATORVASTATIN CALCIUM 80 MG/1
TABLET, FILM COATED ORAL
Qty: 90 TABLET | Refills: 2 | Status: SHIPPED | OUTPATIENT
Start: 2023-10-20

## 2023-10-20 RX ORDER — LISINOPRIL 10 MG/1
TABLET ORAL
Qty: 90 TABLET | Refills: 0 | OUTPATIENT
Start: 2023-10-20

## 2023-10-20 NOTE — TELEPHONE ENCOUNTER
Spoke with pt, was able to r/s appt with another provider, Dr. Williamson. Pt confirmed. Appt letter sent out via mail.    ----- Message from Ewa Chavez LPN sent at 10/18/2023  3:16 PM CDT -----  Regarding: FW: Reschedule appt  Contact: pt @[phone]    ----- Message -----  From: Karen Pollock  Sent: 10/18/2023   1:05 PM CDT  To: [facility]a [facility] Staff  Subject: Reschedule appt                                  Patient is cancelling appt today because of car trouble. Please c/b to reschedule. Thanks

## 2023-10-24 ENCOUNTER — TELEPHONE (OUTPATIENT)
Dept: PRIMARY CARE CLINIC | Facility: CLINIC | Age: 68
End: 2023-10-24
Payer: MEDICARE

## 2023-10-24 DIAGNOSIS — Z89.511 S/P BKA (BELOW KNEE AMPUTATION) BILATERAL: ICD-10-CM

## 2023-10-24 DIAGNOSIS — Z89.512 S/P BKA (BELOW KNEE AMPUTATION) BILATERAL: ICD-10-CM

## 2023-10-24 DIAGNOSIS — Z74.09 IMPAIRED MOBILITY: ICD-10-CM

## 2023-10-24 DIAGNOSIS — I50.32 CHRONIC DIASTOLIC CONGESTIVE HEART FAILURE: Primary | ICD-10-CM

## 2023-10-24 NOTE — TELEPHONE ENCOUNTER
----- Message from Serena Bryant sent at 10/24/2023 10:52 AM CDT -----  Contact: Kamar with [employer] phone [phone]  Calling to request an order for Jeremy. Please advise. Thanks.

## 2023-10-26 ENCOUNTER — TELEPHONE (OUTPATIENT)
Dept: ADMINISTRATIVE | Facility: HOSPITAL | Age: 68
End: 2023-10-26
Payer: MEDICARE

## 2023-10-27 ENCOUNTER — PATIENT OUTREACH (OUTPATIENT)
Dept: ADMINISTRATIVE | Facility: HOSPITAL | Age: 68
End: 2023-10-27
Payer: MEDICARE

## 2023-10-27 ENCOUNTER — TELEPHONE (OUTPATIENT)
Dept: ADMINISTRATIVE | Facility: HOSPITAL | Age: 68
End: 2023-10-27
Payer: MEDICARE

## 2023-10-27 NOTE — PROGRESS NOTES
Health Maintenance Due   Topic Date Due    Shingles Vaccine (2 of 2) 03/08/2022    Eye Exam  03/16/2022    Abdominal Aortic Aneurysm Screening  Never done    COVID-19 Vaccine (4 - 2023-24 season) 09/01/2023    Hemoglobin A1c  11/04/2023    Colorectal Cancer Screening  12/13/2023     AAA Screening -  Patient is scheduled for 10/30/2023  Colonoscopy - Patient is scheduled for 12/18/2023  A1C -  Patient is scheduled for 2/15/2024    Immunizations - reviewed and updated   Care Everywhere - triggered   Care Teams - updated   Outreach - Patient overdue for diabetic eye exam, letter mailed to patient to schedule.

## 2023-11-07 RX ORDER — PANTOPRAZOLE SODIUM 40 MG/1
40 TABLET, DELAYED RELEASE ORAL
Qty: 90 TABLET | Refills: 3 | Status: SHIPPED | OUTPATIENT
Start: 2023-11-07

## 2023-11-07 RX ORDER — CLOPIDOGREL BISULFATE 75 MG/1
75 TABLET ORAL
Qty: 90 TABLET | Refills: 2 | Status: SHIPPED | OUTPATIENT
Start: 2023-11-07

## 2023-11-07 NOTE — TELEPHONE ENCOUNTER
Refill Decision Note   Jordan Mcpherson  is requesting a refill authorization.  Brief Assessment and Rationale for Refill:  Approve     Medication Therapy Plan:  FLOS 2/15/24      Comments:     Note composed:5:45 PM 11/07/2023

## 2023-11-07 NOTE — TELEPHONE ENCOUNTER
Care Due:                  Date            Visit Type   Department     Provider  --------------------------------------------------------------------------------                                EP -                              PRIMARY      [facility]  Last Visit: 10-      CARE ([facility])   PRIMARY CARE   Mike Morley  Next Visit: None Scheduled  None         None Found                                                            Last  Test          Frequency    Reason                     Performed    Due Date  --------------------------------------------------------------------------------    HBA1C.......  6 months...  JARDIANCE, metFORMIN,      08- 02-                             semaglutide..............    [employer] Embedded Care Due Messages. Reference number: [id number].   11/07/2023 10:26:14 AM CST

## 2023-12-20 ENCOUNTER — DOCUMENTATION ONLY (OUTPATIENT)
Dept: SURGERY | Facility: CLINIC | Age: 68
End: 2023-12-20
Payer: MEDICARE

## 2023-12-20 NOTE — PROGRESS NOTES
Paula Wolff, Jared Ugarte LPN; George Celis MA; Nelia Lopez MA  Cc: Connie Shepard, CST  Caller: Unspecified (1 week ago)  Patient had an appt to see Ben on 11/10/23, was a no show.  Needed clearance to have colonoscopy, he is on plavix, need to cancel patient.

## 2023-12-21 ENCOUNTER — PATIENT OUTREACH (OUTPATIENT)
Dept: ADMINISTRATIVE | Facility: HOSPITAL | Age: 68
End: 2023-12-21
Payer: MEDICARE

## 2023-12-21 NOTE — PROGRESS NOTES
Health Maintenance Due   Topic Date Due    RSV Vaccine (Age 60+ and Pregnant patients) (1 - 1-dose 60+ series) Never done    Shingles Vaccine (2 of 2) 03/08/2022    Eye Exam  03/16/2022    Abdominal Aortic Aneurysm Screening  Never done    COVID-19 Vaccine (4 - 2023-24 season) 09/01/2023    Hemoglobin A1c  11/04/2023    Colorectal Cancer Screening  12/13/2023     Immunizations - reviewed and updated   Care Everywhere - [facility]   Care Teams - updated   Outreach - [place] Gap Report 2023 reviewed, called for patient in regards to overdue diabetic eye exam. No answer. LM on   Patient last seen by diabetes management, Supriya Rowell NP on 10/17/2023. BP noted 129/74.

## 2023-12-25 DIAGNOSIS — F41.9 ANXIETY: ICD-10-CM

## 2023-12-25 DIAGNOSIS — F33.1 MODERATE EPISODE OF RECURRENT MAJOR DEPRESSIVE DISORDER: ICD-10-CM

## 2023-12-26 RX ORDER — DULOXETIN HYDROCHLORIDE 60 MG/1
60 CAPSULE, DELAYED RELEASE ORAL
Qty: 90 CAPSULE | Refills: 2 | Status: SHIPPED | OUTPATIENT
Start: 2023-12-26

## 2024-02-09 PROBLEM — Z79.4 TYPE 2 DIABETES MELLITUS WITH BOTH EYES AFFECTED BY MILD NONPROLIFERATIVE RETINOPATHY WITHOUT MACULAR EDEMA, WITH LONG-TERM CURRENT USE OF INSULIN: Status: ACTIVE | Noted: 2024-02-09

## 2024-02-09 PROBLEM — E11.3293 TYPE 2 DIABETES MELLITUS WITH BOTH EYES AFFECTED BY MILD NONPROLIFERATIVE RETINOPATHY WITHOUT MACULAR EDEMA, WITH LONG-TERM CURRENT USE OF INSULIN: Status: ACTIVE | Noted: 2024-02-09

## 2024-02-28 ENCOUNTER — TELEPHONE (OUTPATIENT)
Dept: DIABETES | Facility: CLINIC | Age: 69
End: 2024-02-28
Payer: MEDICARE

## 2024-02-29 ENCOUNTER — TELEPHONE (OUTPATIENT)
Dept: DIABETES | Facility: CLINIC | Age: 69
End: 2024-02-29
Payer: MEDICARE

## 2024-03-12 DIAGNOSIS — N40.0 BENIGN PROSTATIC HYPERPLASIA, UNSPECIFIED WHETHER LOWER URINARY TRACT SYMPTOMS PRESENT: ICD-10-CM

## 2024-03-12 RX ORDER — TAMSULOSIN HYDROCHLORIDE 0.4 MG/1
2 CAPSULE ORAL
Qty: 60 CAPSULE | Refills: 5 | Status: SHIPPED | OUTPATIENT
Start: 2024-03-12

## 2024-03-12 NOTE — TELEPHONE ENCOUNTER
Refill Routing Note   Medication(s) are not appropriate for processing by [facility] for the following reason(s):        No active prescription written by provider    [facility] action(s):  Defer               Appointments  past 12m or future 3m with PCP    Date Provider   Last Visit   10/16/2023 Mike Morley MD   Next Visit   Visit date not found Mike Morley MD   ED visits in past 90 days: 0        Note composed:9:08 AM 03/12/2024

## 2024-04-01 DIAGNOSIS — E11.69 TYPE 2 DIABETES MELLITUS WITH HYPERLIPIDEMIA: ICD-10-CM

## 2024-04-01 DIAGNOSIS — E11.65 TYPE 2 DIABETES MELLITUS WITH HYPERGLYCEMIA, WITH LONG-TERM CURRENT USE OF INSULIN: ICD-10-CM

## 2024-04-01 DIAGNOSIS — E78.5 TYPE 2 DIABETES MELLITUS WITH HYPERLIPIDEMIA: ICD-10-CM

## 2024-04-01 DIAGNOSIS — Z79.4 TYPE 2 DIABETES MELLITUS WITH HYPERGLYCEMIA, WITH LONG-TERM CURRENT USE OF INSULIN: ICD-10-CM

## 2024-04-01 NOTE — TELEPHONE ENCOUNTER
Care Due:                  Date            Visit Type   Department     Provider  --------------------------------------------------------------------------------                                EP -                              PRIMARY      [facility]  Last Visit: 10-      CARE ([facility])   PRIMARY CARE   Mike Morley  Next Visit: None Scheduled  None         None Found                                                            Last  Test          Frequency    Reason                     Performed    Due Date  --------------------------------------------------------------------------------    HBA1C.......  6 months...  JARDIANCE, metFORMIN,      08- 02-                             semaglutide..............    [employer] Embedded Care Due Messages. Reference number: [id number].   4/01/2024 8:01:19 AM CDT

## 2024-04-02 RX ORDER — METFORMIN HYDROCHLORIDE 1000 MG/1
1000 TABLET ORAL 2 TIMES DAILY WITH MEALS
Qty: 180 TABLET | Refills: 0 | Status: SHIPPED | OUTPATIENT
Start: 2024-04-02

## 2024-04-02 NOTE — TELEPHONE ENCOUNTER
Patient is overdue for six-month lab work.  Please make sure he completes his blood work for any further refills.

## 2024-04-02 NOTE — TELEPHONE ENCOUNTER
Refill Routing Note   Medication(s) are not appropriate for processing by [facility] for the following reason(s):        Required labs outdated: a1c    [facility] action(s):  Defer     Requires labs : Yes    Medication Therapy Plan:         Appointments  past 12m or future 3m with PCP    Date Provider   Last Visit   10/16/2023 Mike Morley MD   Next Visit   Visit date not found Mike Morley MD   ED visits in past 90 days: 0        Note composed:11:29 AM 04/02/2024

## 2024-05-15 DIAGNOSIS — E11.69 TYPE 2 DIABETES MELLITUS WITH HYPERLIPIDEMIA: ICD-10-CM

## 2024-05-15 DIAGNOSIS — E78.5 TYPE 2 DIABETES MELLITUS WITH HYPERLIPIDEMIA: ICD-10-CM

## 2024-05-15 RX ORDER — SEMAGLUTIDE 1.34 MG/ML
INJECTION, SOLUTION SUBCUTANEOUS
Qty: 12 ML | Refills: 0 | Status: SHIPPED | OUTPATIENT
Start: 2024-05-15

## 2024-05-15 NOTE — TELEPHONE ENCOUNTER
Care Due:                  Date            Visit Type   Department     Provider  --------------------------------------------------------------------------------                                EP -                              PRIMARY      [facility]  Last Visit: 10-      CARE ([facility])   PRIMARY CARE   Mike Morley  Next Visit: None Scheduled  None         None Found                                                            Last  Test          Frequency    Reason                     Performed    Due Date  --------------------------------------------------------------------------------    CMP.........  12 months..  lisinopriL...............  08- 07-    [facility] Embedded Care Due Messages. Reference number: [id number].   5/15/2024 8:01:19 AM CDT

## 2024-05-15 NOTE — TELEPHONE ENCOUNTER
Refill Routing Note   Medication(s) are not appropriate for processing by [facility] for the following reason(s):        ED/Hospital Visit since last OV with provider  Required labs outdated    [facility] action(s):  Defer   Requires labs : Yes             Appointments  past 12m or future 3m with PCP    Date Provider   Last Visit   10/16/2023 Mike Morley MD   Next Visit   Visit date not found Mike Morley MD   ED visits in past 90 days: 1        Note composed:9:26 AM 05/15/2024

## 2024-06-10 ENCOUNTER — TELEPHONE (OUTPATIENT)
Dept: DIABETES | Facility: CLINIC | Age: 69
End: 2024-06-10
Payer: MEDICARE

## 2024-06-10 RX ORDER — ATORVASTATIN CALCIUM 80 MG/1
TABLET, FILM COATED ORAL
Qty: 90 TABLET | Refills: 0 | Status: SHIPPED | OUTPATIENT
Start: 2024-06-10

## 2024-07-24 NOTE — TELEPHONE ENCOUNTER
Refill Routing Note   Medication(s) are not appropriate for processing by [facility] for the following reason(s):        ED/Hospital Visit since last OV with provider  Required labs abnormal    [facility] action(s):  Defer     Requires labs : Yes             Appointments  past 12m or future 3m with PCP    Date Provider   Last Visit   10/16/2023 Mike Morley MD   Next Visit   Visit date not found Mike Morley MD   ED visits in past 90 days: 1        Note composed:6:38 PM 07/24/2024

## 2024-07-24 NOTE — TELEPHONE ENCOUNTER
Care Due:                  Date            Visit Type   Department     Provider  --------------------------------------------------------------------------------                                EP -                              PRIMARY      [facility]  Last Visit: 10-      CARE ([facility])   PRIMARY CARE   Mike Morley  Next Visit: None Scheduled  None         None Found                                                            Last  Test          Frequency    Reason                     Performed    Due Date  --------------------------------------------------------------------------------    CMP.........  12 months..  lisinopriL...............  08- 07-    HBA1C.......  6 months...  OZEMPIC..................  08- 02-    Health [facility] Embedded Care Due Messages. Reference number: [id number].   7/24/2024 2:15:16 PM CDT

## 2024-07-26 RX ORDER — CLOPIDOGREL BISULFATE 75 MG/1
75 TABLET ORAL
Qty: 90 TABLET | Refills: 0 | Status: SHIPPED | OUTPATIENT
Start: 2024-07-26

## 2024-08-19 DIAGNOSIS — E11.69 TYPE 2 DIABETES MELLITUS WITH HYPERLIPIDEMIA: ICD-10-CM

## 2024-08-19 DIAGNOSIS — E78.5 TYPE 2 DIABETES MELLITUS WITH HYPERLIPIDEMIA: ICD-10-CM

## 2024-08-19 DIAGNOSIS — Z79.4 TYPE 2 DIABETES MELLITUS WITH HYPERGLYCEMIA, WITH LONG-TERM CURRENT USE OF INSULIN: ICD-10-CM

## 2024-08-19 DIAGNOSIS — E11.65 TYPE 2 DIABETES MELLITUS WITH HYPERGLYCEMIA, WITH LONG-TERM CURRENT USE OF INSULIN: ICD-10-CM

## 2024-08-19 RX ORDER — EMPAGLIFLOZIN 25 MG/1
25 TABLET, FILM COATED ORAL
Qty: 90 TABLET | Refills: 0 | Status: SHIPPED | OUTPATIENT
Start: 2024-08-19

## 2024-08-26 DIAGNOSIS — E11.69 TYPE 2 DIABETES MELLITUS WITH HYPERLIPIDEMIA: ICD-10-CM

## 2024-08-26 DIAGNOSIS — E78.5 TYPE 2 DIABETES MELLITUS WITH HYPERLIPIDEMIA: ICD-10-CM

## 2024-08-26 NOTE — TELEPHONE ENCOUNTER
No care due was identified.  [facility] Embedded Care Due Messages. Reference number: [id number].   8/26/2024 5:31:00 PM CDT

## 2024-08-27 RX ORDER — METFORMIN HYDROCHLORIDE 1000 MG/1
1000 TABLET ORAL 2 TIMES DAILY WITH MEALS
Qty: 180 TABLET | Refills: 0 | Status: SHIPPED | OUTPATIENT
Start: 2024-08-27

## 2024-08-27 NOTE — TELEPHONE ENCOUNTER
Refill Routing Note   Medication(s) are not appropriate for processing by [facility] for the following reason(s):      Patient seen in ED/Hospital since LOV with provider  Required labs outdated    [facility] action(s):  Defer Care Due:  None identified            Appointments  past 12m or future 3m with PCP    Date Provider   Last Visit   10/16/2023 Mike Morley MD   Next Visit   Visit date not found Mike Morley MD   ED visits in past 90 days: 1        Note composed:8:02 AM 08/27/2024

## 2024-08-27 NOTE — TELEPHONE ENCOUNTER
Tried to contact the patient and alternative contact to schedule 3 month follow up appointment, both phone numbers state the prescribers you are trying to reach is not in service.

## 2024-09-08 DIAGNOSIS — F33.1 MODERATE EPISODE OF RECURRENT MAJOR DEPRESSIVE DISORDER: ICD-10-CM

## 2024-09-08 DIAGNOSIS — F41.9 ANXIETY: ICD-10-CM

## 2024-09-09 RX ORDER — DULOXETIN HYDROCHLORIDE 60 MG/1
60 CAPSULE, DELAYED RELEASE ORAL
Qty: 90 CAPSULE | Refills: 0 | Status: SHIPPED | OUTPATIENT
Start: 2024-09-09

## 2024-09-09 NOTE — TELEPHONE ENCOUNTER
No care due was identified.  [facility] Embedded Care Due Messages. Reference number: [id number].   9/09/2024 3:50:29 PM CDT

## 2024-09-09 NOTE — TELEPHONE ENCOUNTER
Refill Routing Note   Medication(s) are not appropriate for processing by [facility] for the following reason(s):        ED/Hospital Visit since last OV with provider: 5/13/24 for chronic ulcer of lower extremity & 8/11/24 for heat syncope  Required vitals abnormal: 172/79     [facility] action(s):  Defer               Appointments  past 12m or future 3m with PCP    Date Provider   Last Visit   10/16/2023 Mike Morley MD   Next Visit   Visit date not found Mike Morley MD   ED visits in past 90 days: 1        Note composed:3:51 PM 09/09/2024

## 2024-09-11 DIAGNOSIS — E11.69 TYPE 2 DIABETES MELLITUS WITH HYPERLIPIDEMIA: ICD-10-CM

## 2024-09-11 DIAGNOSIS — E78.5 TYPE 2 DIABETES MELLITUS WITH HYPERLIPIDEMIA: ICD-10-CM

## 2024-09-11 RX ORDER — SEMAGLUTIDE 1.34 MG/ML
INJECTION, SOLUTION SUBCUTANEOUS
Qty: 9 ML | Refills: 0 | Status: SHIPPED | OUTPATIENT
Start: 2024-09-11

## 2024-09-11 NOTE — TELEPHONE ENCOUNTER
Refill Routing Note   Medication(s) are not appropriate for processing by [facility] for the following reason(s):        Required labs outdated    [facility] action(s):  Defer             Appointments  past 12m or future 3m with PCP    Date Provider   Last Visit   10/16/2023 Mike Morley MD   Next Visit   Visit date not found Mike Morley MD   ED visits in past 90 days: 1        Note composed:11:32 AM 09/11/2024

## 2024-09-11 NOTE — TELEPHONE ENCOUNTER
No care due was identified.  [facility] Embedded Care Due Messages. Reference number: [id number].   9/11/2024 9:23:51 AM CDT

## 2024-09-15 DIAGNOSIS — I10 BENIGN ESSENTIAL HTN: ICD-10-CM

## 2024-09-15 NOTE — TELEPHONE ENCOUNTER
No care due was identified.  [facility] Embedded Care Due Messages. Reference number: [id number].   9/15/2024 8:00:57 AM CDT

## 2024-09-16 RX ORDER — CARVEDILOL 6.25 MG/1
6.25 TABLET ORAL 2 TIMES DAILY WITH MEALS
Qty: 180 TABLET | Refills: 0 | Status: SHIPPED | OUTPATIENT
Start: 2024-09-16

## 2024-09-16 RX ORDER — LISINOPRIL 20 MG/1
20 TABLET ORAL
Qty: 90 TABLET | Refills: 0 | Status: SHIPPED | OUTPATIENT
Start: 2024-09-16

## 2024-09-16 NOTE — TELEPHONE ENCOUNTER
Refill Routing Note   Medication(s) are not appropriate for processing by [facility] for the following reason(s):        ED/Hospital Visit since last OV with provider  Required vitals abnormal    [facility] action(s):  Defer               Appointments  past 12m or future 3m with PCP    Date Provider   Last Visit   10/16/2023 Mike Morley MD   Next Visit   Visit date not found Mike Morley MD   ED visits in past 90 days: 1        Note composed:1:15 PM 09/16/2024

## 2024-09-17 NOTE — TELEPHONE ENCOUNTER
Unable to reach pt, goes straight to message stating the number you have dialed is no longer in service.

## 2024-09-18 ENCOUNTER — OFFICE VISIT (OUTPATIENT)
Dept: PRIMARY CARE CLINIC | Facility: CLINIC | Age: 69
End: 2024-09-18
Payer: MEDICARE

## 2024-09-18 VITALS
SYSTOLIC BLOOD PRESSURE: 118 MMHG | OXYGEN SATURATION: 97 % | DIASTOLIC BLOOD PRESSURE: 68 MMHG | BODY MASS INDEX: 18.99 KG/M2 | HEIGHT: 72 IN | RESPIRATION RATE: 16 BRPM | HEART RATE: 136 BPM

## 2024-09-18 DIAGNOSIS — Z12.11 ENCOUNTER FOR SCREENING FOR MALIGNANT NEOPLASM OF COLON: ICD-10-CM

## 2024-09-18 DIAGNOSIS — E78.5 HYPERLIPIDEMIA, UNSPECIFIED HYPERLIPIDEMIA TYPE: ICD-10-CM

## 2024-09-18 DIAGNOSIS — T87.89 ULCER OF AMPUTATION STUMP OF LOWER EXTREMITY: Primary | ICD-10-CM

## 2024-09-18 DIAGNOSIS — Z23 ENCOUNTER FOR VACCINATION: ICD-10-CM

## 2024-09-18 DIAGNOSIS — M86.9 OSTEOMYELITIS OF RIGHT TIBIA, UNSPECIFIED TYPE: ICD-10-CM

## 2024-09-18 DIAGNOSIS — Z12.5 SCREENING PSA (PROSTATE SPECIFIC ANTIGEN): ICD-10-CM

## 2024-09-18 DIAGNOSIS — L97.909 ULCER OF AMPUTATION STUMP OF LOWER EXTREMITY: Primary | ICD-10-CM

## 2024-09-18 PROCEDURE — 3288F FALL RISK ASSESSMENT DOCD: CPT | Mod: HCNC,CPTII,S$GLB, | Performed by: INTERNAL MEDICINE

## 2024-09-18 PROCEDURE — 1125F AMNT PAIN NOTED PAIN PRSNT: CPT | Mod: HCNC,CPTII,S$GLB, | Performed by: INTERNAL MEDICINE

## 2024-09-18 PROCEDURE — 90653 IIV ADJUVANT VACCINE IM: CPT | Mod: HCNC,S$GLB,, | Performed by: INTERNAL MEDICINE

## 2024-09-18 PROCEDURE — 1159F MED LIST DOCD IN RCRD: CPT | Mod: HCNC,CPTII,S$GLB, | Performed by: INTERNAL MEDICINE

## 2024-09-18 PROCEDURE — 1100F PTFALLS ASSESS-DOCD GE2>/YR: CPT | Mod: HCNC,CPTII,S$GLB, | Performed by: INTERNAL MEDICINE

## 2024-09-18 PROCEDURE — 1160F RVW MEDS BY RX/DR IN RCRD: CPT | Mod: HCNC,CPTII,S$GLB, | Performed by: INTERNAL MEDICINE

## 2024-09-18 PROCEDURE — 99214 OFFICE O/P EST MOD 30 MIN: CPT | Mod: HCNC,25,S$GLB, | Performed by: INTERNAL MEDICINE

## 2024-09-18 PROCEDURE — 4010F ACE/ARB THERAPY RXD/TAKEN: CPT | Mod: HCNC,CPTII,S$GLB, | Performed by: INTERNAL MEDICINE

## 2024-09-18 PROCEDURE — G0008 ADMIN INFLUENZA VIRUS VAC: HCPCS | Mod: HCNC,S$GLB,, | Performed by: INTERNAL MEDICINE

## 2024-09-18 PROCEDURE — 3074F SYST BP LT 130 MM HG: CPT | Mod: HCNC,CPTII,S$GLB, | Performed by: INTERNAL MEDICINE

## 2024-09-18 PROCEDURE — 3008F BODY MASS INDEX DOCD: CPT | Mod: HCNC,CPTII,S$GLB, | Performed by: INTERNAL MEDICINE

## 2024-09-18 PROCEDURE — 99999 PR PBB SHADOW E&M-EST. PATIENT-LVL V: CPT | Mod: PBBFAC,HCNC,, | Performed by: INTERNAL MEDICINE

## 2024-09-18 PROCEDURE — 3078F DIAST BP <80 MM HG: CPT | Mod: HCNC,CPTII,S$GLB, | Performed by: INTERNAL MEDICINE

## 2024-09-18 RX ORDER — SULFAMETHOXAZOLE AND TRIMETHOPRIM 800; 160 MG/1; MG/1
1 TABLET ORAL 2 TIMES DAILY
Qty: 20 TABLET | Refills: 0 | Status: SHIPPED | OUTPATIENT
Start: 2024-09-18 | End: 2024-09-28

## 2024-09-18 RX ORDER — MUPIROCIN 20 MG/G
OINTMENT TOPICAL 2 TIMES DAILY
Qty: 22 G | Refills: 0 | Status: SHIPPED | OUTPATIENT
Start: 2024-09-18

## 2024-09-18 NOTE — PROGRESS NOTES
Verified pt ID using name and . Isabella. Administered Flu 65 in right deltoid per physician order using aseptic technique. Aspirated and no blood return noted. Pt tolerated well with no adverse reactions noted.

## 2024-09-20 NOTE — PROGRESS NOTES
Subjective:       Patient ID: Jordan Mcpherson is a 68 y.o. male.    Chief Complaint: Wound Infection    HPI  patient with history of hypertension hyper lipidemia coronary artery disease history of CVA peripheral vascular disease diabetes mellitus depression lumbar spine degenerative disc disease with chronic pain and status post below-knee amputation of the right leg and tobacco use patient visit today with complaint of he has having small ulcer at the stump on the right leg for more than 5 months and he said that he can still see the bone exposed at the opening of the wound he has been on antibiotic and local treatment he denies chill short of breath or inguinal adenopathy he requests a referral to see the surgeon who did surgery on his leg in the past Dr. Pritesh Esipnoza at [facility]  Review of Systems   Constitutional:  Negative for unexpected weight change.   Respiratory:  Negative for shortness of breath.    Cardiovascular:  Negative for chest pain.   Gastrointestinal:  Negative for abdominal pain.   Musculoskeletal:  Positive for arthralgias and back pain.   Skin:         Chronic ulcer of the stump of the right below-knee amputation with chronic infection not healing   Psychiatric/Behavioral:  Positive for dysphoric mood.        Objective:      Physical Exam  Vitals and nursing note reviewed.   Constitutional:       General: He is not in acute distress.     Appearance: He is well-developed.      Comments: Pt is in wheel chair no distress   HENT:      Head: Normocephalic and atraumatic.      Right Ear: External ear normal.      Left Ear: External ear normal.      Nose: Nose normal.   Eyes:      Extraocular Movements: Extraocular movements intact.      Conjunctiva/sclera: Conjunctivae normal.      Pupils: Pupils are equal, round, and reactive to light.   Neck:      Thyroid: No thyromegaly.   Cardiovascular:      Rate and Rhythm: Normal rate and regular rhythm.      Heart sounds: Normal heart sounds. No  murmur heard.     No friction rub. No gallop.   Pulmonary:      Effort: Pulmonary effort is normal. No respiratory distress.      Breath sounds: Normal breath sounds. No wheezing.   Abdominal:      General: Bowel sounds are normal. There is no distension.      Palpations: Abdomen is soft.      Tenderness: There is no abdominal tenderness.   Musculoskeletal:         General: No tenderness or deformity. Normal range of motion.      Cervical back: Normal range of motion and neck supple.   Lymphadenopathy:      Cervical: No cervical adenopathy.   Skin:     General: Skin is warm and dry.      Findings: No erythema or rash.      Comments: Rt BKA small ulcer with erythematous rim and exposed bone   Neurological:      Mental Status: He is alert and oriented to person, place, and time.   Psychiatric:         Mood and Affect: Mood normal.         Thought Content: Thought content normal.         Judgment: Judgment normal.         Assessment:       1. Ulcer of amputation stump of lower extremity    2. Encounter for screening for malignant neoplasm of colon    3. Screening PSA (prostate specific antigen)    4. Hyperlipidemia, unspecified hyperlipidemia type    5. Osteomyelitis of right tibia, unspecified type    6. Encounter for vaccination        Plan:       Ulcer of amputation stump of lower extremity  -     sulfamethoxazole-trimethoprim 800-160mg (BACTRIM DS) 800-160 mg Tab; Take 1 tablet by mouth 2 (two) times daily. for 10 days  Dispense: 20 tablet; Refill: 0  -     mupirocin (BACTROBAN) 2 % ointment; Apply topically 2 (two) times daily.  Dispense: 22 g; Refill: 0  -     Cancel: Ambulatory referral/consult to General Surgery; Future; Expected date: 09/19/2024  -     Ambulatory referral/consult to General Surgery; Future; Expected date: 09/19/2024    Encounter for screening for malignant neoplasm of colon  -     Case Request Endoscopy: COLONOSCOPY    Screening PSA (prostate specific antigen)  -     PSA, Screening; Future;  Expected date: 09/18/2024    Hyperlipidemia, unspecified hyperlipidemia type  -     Lipid Panel; Future; Expected date: 09/18/2024    Osteomyelitis of right tibia, unspecified type  Comments:  A clean wound with has been peroxide applied triple antibiotic and cover with large bandage and referral to surgery    Encounter for vaccination  -     influenza (adjuvanted) (Fluad) 45 mcg/0.5 mL IM vaccine (> or = 66 yo) 0.5 mL        Medication List with Changes/Refills   New Medications    MUPIROCIN (BACTROBAN) 2 % OINTMENT    Apply topically 2 (two) times daily.    SULFAMETHOXAZOLE-TRIMETHOPRIM 800-160MG (BACTRIM DS) 800-160 MG TAB    Take 1 tablet by mouth 2 (two) times daily. for 10 days   Current Medications    ALBUTEROL (PROVENTIL/VENTOLIN HFA) 90 MCG/ACTUATION INHALER    Inhale 1-2 puffs into the lungs every 6 (six) hours as needed for Shortness of Breath. Rescue    ASPIRIN (ECOTRIN) 81 MG EC TABLET    Take 81 mg by mouth once daily.    ATORVASTATIN (LIPITOR) 80 MG TABLET    TAKE ONE TABLET BY MOUTH DAILY    BACLOFEN (LIORESAL) 20 MG TABLET    Take 20 mg by mouth 5 (five) times daily. Takes 5 times daily. Total of 120 mg    BLOOD SUGAR DIAGNOSTIC STRP    To check BG 3 times daily, to use with insurance preferred meter    BLOOD-GLUCOSE METER KIT    To check BG 3 times daily, to use with insurance preferred meter    CARVEDILOL (COREG) 6.25 MG TABLET    TAKE ONE TABLET BY MOUTH TWICE DAILY with meals    CLOPIDOGREL (PLAVIX) 75 MG TABLET    TAKE ONE TABLET BY MOUTH DAILY    DULOXETINE (CYMBALTA) 60 MG CAPSULE    TAKE ONE CAPSULE BY MOUTH DAILY    FOLIC ACID (FOLVITE) 1 MG TABLET    TAKE ONE TABLET BY MOUTH DAILY    HYDROCODONE-ACETAMINOPHEN 10-325MG (NORCO)  MG TAB    Take 1 tablet by mouth every 6 (six) hours as needed.     JARDIANCE 25 MG TABLET    TAKE ONE TABLET BY MOUTH DAILY    LANCETS MISC    To check BG 3 times daily, to use with insurance preferred meter    LISINOPRIL (PRINIVIL,ZESTRIL) 20 MG TABLET     TAKE ONE TABLET BY MOUTH ONCE DAILY    METFORMIN (GLUCOPHAGE) 1000 MG TABLET    TAKE ONE TABLET BY MOUTH TWICE DAILY WITH MEALS    MUPIROCIN (BACTROBAN) 2 % OINTMENT    Apply topically 3 (three) times daily.    NITROGLYCERIN (NITROSTAT) 0.4 MG SL TABLET    Place 1 tablet (0.4 mg total) under the tongue every 5 (five) minutes as needed for Chest pain.    OZEMPIC 1 MG/DOSE (4 MG/3 ML)    INJECT ONE MG into THE SKIN EVERY 7 DAYS    PANTOPRAZOLE (PROTONIX) 40 MG TABLET    TAKE ONE TABLET BY MOUTH DAILY    SOD PICOSULF-MAG OX-CITRIC AC (CLENPIQ) 10 MG-3.5 GRAM- 12 GRAM/175 ML SOLN    Take 175 mLs by mouth As instructed (bowel prep).    TAMSULOSIN (FLOMAX) 0.4 MG CAP    TAKE TWO CAPSULES BY MOUTH DAILY

## 2024-09-30 ENCOUNTER — TELEPHONE (OUTPATIENT)
Dept: PRIMARY CARE CLINIC | Facility: CLINIC | Age: 69
End: 2024-09-30
Payer: MEDICARE

## 2024-09-30 NOTE — TELEPHONE ENCOUNTER
----- Message from Modesta sent at 9/30/2024 11:50 AM CDT -----  Contact: [phone]@[facility]  Good morning [facility] would like a call back to discuss if the doc would follow home health orders for the patient . Please call them to advise [phone]

## 2024-10-03 ENCOUNTER — PATIENT OUTREACH (OUTPATIENT)
Dept: ADMINISTRATIVE | Facility: CLINIC | Age: 69
End: 2024-10-03
Payer: MEDICARE

## 2024-10-09 ENCOUNTER — PATIENT OUTREACH (OUTPATIENT)
Dept: ADMINISTRATIVE | Facility: CLINIC | Age: 69
End: 2024-10-09
Payer: MEDICARE

## 2024-10-09 ENCOUNTER — TELEPHONE (OUTPATIENT)
Dept: PRIMARY CARE CLINIC | Facility: CLINIC | Age: 69
End: 2024-10-09
Payer: MEDICARE

## 2024-10-09 PROCEDURE — G0180 MD CERTIFICATION HHA PATIENT: HCPCS | Mod: ,,, | Performed by: FAMILY MEDICINE

## 2024-10-09 NOTE — TELEPHONE ENCOUNTER
----- Message from Kasey sent at 10/9/2024  1:40 PM CDT -----  Contact: [facility] / call pt @[phone]  Type: Home Health (orders, updates, clarifications, etc.)    Home Health Agency/Nurse:[facility]     Phone number:Chinmay did not want to leave her contact number she stated to vasile the HH or patient       Reason for call:update     Comments:Miguel said that she is calling to let the doctor know that patient declined OT services Please advise

## 2024-10-09 NOTE — PROGRESS NOTES
C3 nurse attempted to contact Jordan Mcpherson for a TCC post hospital discharge follow up call. No answer. Left voicemail with callback information. The patient has a scheduled HOSFU appointment with Asya Mercado MD (cardiology @ [facility]) on 10/18/24 @ 11:00am.

## 2024-10-10 NOTE — PROGRESS NOTES
C3 nurse attempted to contact Jordan Mcpherson for a TCC post hospital discharge follow up call. The patient's son answered, and will let patient know I called, when he sees the patient again. Callback information provided to son. The patient has a scheduled [facility] appointment with Asya Mercado MD (cardiology @ [facility]) on 10/18/24 @ 11:00am.

## 2024-10-11 NOTE — PROGRESS NOTES
3rd attempt-C3 nurse attempted to contact Jordan Mcpherson for a TCC post hospital discharge follow up call. No answer. Left voicemail with callback information, and OOC#.  The patient has a scheduled HOSFU appointment with Asya Mercado MD (cardiology @ [facility]) on 10/18/24 @ 11:00am.

## 2024-11-08 ENCOUNTER — EXTERNAL HOME HEALTH (OUTPATIENT)
Dept: HOME HEALTH SERVICES | Facility: HOSPITAL | Age: 69
End: 2024-11-08
Payer: MEDICARE

## 2024-11-11 ENCOUNTER — TELEPHONE (OUTPATIENT)
Dept: PRIMARY CARE CLINIC | Facility: CLINIC | Age: 69
End: 2024-11-11
Payer: MEDICARE

## 2024-11-11 NOTE — TELEPHONE ENCOUNTER
----- Message from Nurse Sophia sent at 11/11/2024  8:26 AM CST -----  Contact: Jayna/jesse ugarte/    ----- Message -----  From: Asya Mcnair MA  Sent: 11/8/2024   8:32 AM CST  To: Ashley Palacio MA; Lauren Reyes, LPN      ----- Message -----  From: Melina Ritchie  Sent: 11/8/2024   8:25 AM CST  To: Ned Escobar Staff    Jayna with [employer] called, following up on standard written order and note requested with the letter writter - faxed on 10/31/24 and 11/05/24. Would like to know when they will receive it back.  # [phone].

## 2024-11-11 NOTE — TELEPHONE ENCOUNTER
----- Message from Med Assistant Sky sent at 11/8/2024  8:32 AM CST -----  Contact: Jayna/jesse ugarte/    ----- Message -----  From: Melina Ritchie  Sent: 11/8/2024   8:25 AM CST  To: Ned Escobar Staff    Jayna with [facility] called, following up on standard written order and note requested with the letter writter - faxed on 10/31/24 and 11/05/24. Would like to know when they will receive it back.  # [phone].

## 2024-11-11 NOTE — TELEPHONE ENCOUNTER
----- Message from Modesta sent at 11/11/2024 11:06 AM CST -----  Contact: [phone]@Ms. Day from [facility]  Good morning Ms. Day from [facility] would like a call back to discuss the status of the patient notes and orders fax over on 10/31 and 11/5. This can be fax to them at  [phone]. Any questions please call  to advise [phone]

## 2024-11-22 RX ORDER — ATORVASTATIN CALCIUM 80 MG/1
TABLET, FILM COATED ORAL
Qty: 90 TABLET | Refills: 0 | Status: SHIPPED | OUTPATIENT
Start: 2024-11-22

## 2024-11-25 ENCOUNTER — DOCUMENT SCAN (OUTPATIENT)
Dept: HOME HEALTH SERVICES | Facility: HOSPITAL | Age: 69
End: 2024-11-25
Payer: MEDICARE

## 2024-11-26 DIAGNOSIS — N40.0 BENIGN PROSTATIC HYPERPLASIA, UNSPECIFIED WHETHER LOWER URINARY TRACT SYMPTOMS PRESENT: ICD-10-CM

## 2024-11-26 RX ORDER — TAMSULOSIN HYDROCHLORIDE 0.4 MG/1
2 CAPSULE ORAL
Qty: 180 CAPSULE | Refills: 0 | Status: SHIPPED | OUTPATIENT
Start: 2024-11-26

## 2024-11-26 NOTE — TELEPHONE ENCOUNTER
Care Due:                  Date            Visit Type   Department     Provider  --------------------------------------------------------------------------------                                EP -                              PRIMARY      [facility]  Last Visit: 09-      CARE ([facility])   PRIMARY CARE   Shawn Marino  Next Visit: None Scheduled  None         None Found                                                            Last  Test          Frequency    Reason                     Performed    Due Date  --------------------------------------------------------------------------------    HBA1C.......  6 months...  VIVIENNE LOPEZ,        08- 02-                             metFORMIN................    [employer] Embedded Care Due Messages. Reference number: [id number].   11/26/2024 4:33:07 PM CST

## 2024-11-27 NOTE — TELEPHONE ENCOUNTER
Provider Staff:  Action required for this patient    Requires labs      Please see care gap opportunities below in Care Due Message.    Thanks!  [facility]     Appointments      Date Provider   Last Visit   10/16/2023 Mike Morley MD   Next Visit   Visit date not found Mike Morley MD     Refill Decision Note   Jordan Mcpherson  is requesting a refill authorization.  Brief Assessment and Rationale for Refill:  Approve     Medication Therapy Plan:         Extended chart review required: Yes   Comments:     Note composed:9:40 PM 11/26/2024

## 2024-12-05 ENCOUNTER — TELEPHONE (OUTPATIENT)
Dept: PRIMARY CARE CLINIC | Facility: CLINIC | Age: 69
End: 2024-12-05
Payer: MEDICARE

## 2024-12-17 DIAGNOSIS — E11.69 TYPE 2 DIABETES MELLITUS WITH HYPERLIPIDEMIA: ICD-10-CM

## 2024-12-17 DIAGNOSIS — E78.5 TYPE 2 DIABETES MELLITUS WITH HYPERLIPIDEMIA: ICD-10-CM

## 2024-12-17 RX ORDER — SEMAGLUTIDE 1.34 MG/ML
INJECTION, SOLUTION SUBCUTANEOUS
Qty: 9 ML | Refills: 0 | Status: SHIPPED | OUTPATIENT
Start: 2024-12-17

## 2024-12-17 NOTE — TELEPHONE ENCOUNTER
No care due was identified.  [facility] Embedded Care Due Messages. Reference number: [id number].   12/17/2024 9:05:25 AM CST

## 2024-12-17 NOTE — TELEPHONE ENCOUNTER
Refill Routing Note   Medication(s) are not appropriate for processing by [facility] for the following reason(s):        ED/Hospital Visit since last OV with provider    [facility] action(s):  Defer             Appointments  past 12m or future 3m with PCP    Date Provider   Last Visit   10/16/2023 Mike Morley MD   Next Visit   Visit date not found Mike Morley MD   ED visits in past 90 days: 0        Note composed:2:48 PM 12/17/2024

## 2025-01-09 DIAGNOSIS — E11.69 TYPE 2 DIABETES MELLITUS WITH HYPERLIPIDEMIA: ICD-10-CM

## 2025-01-09 DIAGNOSIS — E78.5 TYPE 2 DIABETES MELLITUS WITH HYPERLIPIDEMIA: ICD-10-CM

## 2025-01-09 NOTE — TELEPHONE ENCOUNTER
No care due was identified.  [facility] Embedded Care Due Messages. Reference number: [id number].   1/09/2025 5:26:19 PM CST

## 2025-01-10 RX ORDER — METFORMIN HYDROCHLORIDE 1000 MG/1
1000 TABLET ORAL 2 TIMES DAILY WITH MEALS
Qty: 180 TABLET | Refills: 0 | OUTPATIENT
Start: 2025-01-10

## 2025-01-10 NOTE — TELEPHONE ENCOUNTER
Refill Routing Note   Medication(s) are not appropriate for processing by [facility] for the following reason(s):        Patient not seen by provider within 15 months  ED/Hospital Visit since last OV with provider    [facility] action(s):  Defer             Appointments  past 12m or future 3m with PCP    Date Provider   Last Visit   10/16/2023 Mike Morley MD   Next Visit   Visit date not found Mike Morley MD   ED visits in past 90 days: 0        Note composed:8:37 AM 01/10/2025

## 2025-01-13 DIAGNOSIS — Z00.00 ENCOUNTER FOR MEDICARE ANNUAL WELLNESS EXAM: ICD-10-CM

## 2025-03-19 DIAGNOSIS — F33.1 MODERATE EPISODE OF RECURRENT MAJOR DEPRESSIVE DISORDER: ICD-10-CM

## 2025-03-19 DIAGNOSIS — N40.0 BENIGN PROSTATIC HYPERPLASIA, UNSPECIFIED WHETHER LOWER URINARY TRACT SYMPTOMS PRESENT: ICD-10-CM

## 2025-03-19 DIAGNOSIS — E78.5 TYPE 2 DIABETES MELLITUS WITH HYPERLIPIDEMIA: ICD-10-CM

## 2025-03-19 DIAGNOSIS — E11.69 TYPE 2 DIABETES MELLITUS WITH HYPERLIPIDEMIA: ICD-10-CM

## 2025-03-19 DIAGNOSIS — F41.9 ANXIETY: ICD-10-CM

## 2025-03-19 NOTE — TELEPHONE ENCOUNTER
Care Due:                  Date            Visit Type   Department     Provider  --------------------------------------------------------------------------------                                EP -                              PRIMARY      [facility]  Last Visit: 09-      CARE ([facility])   PRIMARY CARE   Shawn Marino  Next Visit: None Scheduled  None         None Found                                                            Last  Test          Frequency    Reason                     Performed    Due Date  --------------------------------------------------------------------------------    HBA1C.......  6 months...  VIVIENNE LOPEZ,        08- 02-                             metFORMIN................    [employer] Embedded Care Due Messages. Reference number: [id number].   3/19/2025 3:47:52 PM CDT   given to pt

## 2025-03-20 RX ORDER — TAMSULOSIN HYDROCHLORIDE 0.4 MG/1
2 CAPSULE ORAL
Qty: 180 CAPSULE | Refills: 0 | Status: SHIPPED | OUTPATIENT
Start: 2025-03-20

## 2025-03-20 RX ORDER — ATORVASTATIN CALCIUM 80 MG/1
80 TABLET, FILM COATED ORAL
Qty: 90 TABLET | Refills: 1 | Status: SHIPPED | OUTPATIENT
Start: 2025-03-20

## 2025-03-20 RX ORDER — SEMAGLUTIDE 1.34 MG/ML
INJECTION, SOLUTION SUBCUTANEOUS
Qty: 9 ML | Refills: 0 | Status: SHIPPED | OUTPATIENT
Start: 2025-03-20

## 2025-03-20 RX ORDER — DULOXETIN HYDROCHLORIDE 60 MG/1
60 CAPSULE, DELAYED RELEASE ORAL
Qty: 90 CAPSULE | Refills: 1 | Status: SHIPPED | OUTPATIENT
Start: 2025-03-20

## 2025-03-31 ENCOUNTER — TELEPHONE (OUTPATIENT)
Dept: GASTROENTEROLOGY | Facility: CLINIC | Age: 70
End: 2025-03-31
Payer: MEDICARE

## 2025-03-31 ENCOUNTER — OFFICE VISIT (OUTPATIENT)
Dept: PRIMARY CARE CLINIC | Facility: CLINIC | Age: 70
End: 2025-03-31
Payer: MEDICARE

## 2025-03-31 ENCOUNTER — CLINICAL SUPPORT (OUTPATIENT)
Dept: PRIMARY CARE CLINIC | Facility: CLINIC | Age: 70
End: 2025-03-31
Payer: MEDICARE

## 2025-03-31 ENCOUNTER — RESULTS FOLLOW-UP (OUTPATIENT)
Dept: PRIMARY CARE CLINIC | Facility: CLINIC | Age: 70
End: 2025-03-31

## 2025-03-31 VITALS
DIASTOLIC BLOOD PRESSURE: 60 MMHG | BODY MASS INDEX: 18.99 KG/M2 | SYSTOLIC BLOOD PRESSURE: 120 MMHG | TEMPERATURE: 97 F | HEIGHT: 72 IN | RESPIRATION RATE: 16 BRPM | HEART RATE: 63 BPM

## 2025-03-31 DIAGNOSIS — F41.9 ANXIETY: ICD-10-CM

## 2025-03-31 DIAGNOSIS — Z72.0 TOBACCO ABUSE: ICD-10-CM

## 2025-03-31 DIAGNOSIS — M06.9 RHEUMATOID ARTHRITIS OF OTHER SITE, UNSPECIFIED WHETHER RHEUMATOID FACTOR PRESENT: ICD-10-CM

## 2025-03-31 DIAGNOSIS — F33.1 MODERATE EPISODE OF RECURRENT MAJOR DEPRESSIVE DISORDER: ICD-10-CM

## 2025-03-31 DIAGNOSIS — Z89.512 S/P BKA (BELOW KNEE AMPUTATION) BILATERAL: ICD-10-CM

## 2025-03-31 DIAGNOSIS — F17.210 NICOTINE DEPENDENCE, CIGARETTES, UNCOMPLICATED: ICD-10-CM

## 2025-03-31 DIAGNOSIS — Z79.4 TYPE 2 DIABETES MELLITUS WITH BOTH EYES AFFECTED BY MILD NONPROLIFERATIVE RETINOPATHY WITHOUT MACULAR EDEMA, WITH LONG-TERM CURRENT USE OF INSULIN: ICD-10-CM

## 2025-03-31 DIAGNOSIS — Z79.4 TYPE 2 DIABETES MELLITUS WITH BOTH EYES AFFECTED BY MILD NONPROLIFERATIVE RETINOPATHY WITHOUT MACULAR EDEMA, WITH LONG-TERM CURRENT USE OF INSULIN: Primary | ICD-10-CM

## 2025-03-31 DIAGNOSIS — Z86.73 HISTORY OF STROKE: ICD-10-CM

## 2025-03-31 DIAGNOSIS — I50.32 CHRONIC DIASTOLIC CONGESTIVE HEART FAILURE: ICD-10-CM

## 2025-03-31 DIAGNOSIS — Z89.511 S/P BKA (BELOW KNEE AMPUTATION) BILATERAL: ICD-10-CM

## 2025-03-31 DIAGNOSIS — I10 BENIGN ESSENTIAL HTN: ICD-10-CM

## 2025-03-31 DIAGNOSIS — Z12.12 ENCOUNTER FOR COLORECTAL CANCER SCREENING: ICD-10-CM

## 2025-03-31 DIAGNOSIS — E11.3293 TYPE 2 DIABETES MELLITUS WITH BOTH EYES AFFECTED BY MILD NONPROLIFERATIVE RETINOPATHY WITHOUT MACULAR EDEMA, WITH LONG-TERM CURRENT USE OF INSULIN: ICD-10-CM

## 2025-03-31 DIAGNOSIS — N40.0 BENIGN PROSTATIC HYPERPLASIA, UNSPECIFIED WHETHER LOWER URINARY TRACT SYMPTOMS PRESENT: ICD-10-CM

## 2025-03-31 DIAGNOSIS — Z12.5 ENCOUNTER FOR SCREENING FOR MALIGNANT NEOPLASM OF PROSTATE: ICD-10-CM

## 2025-03-31 DIAGNOSIS — J44.9 CHRONIC OBSTRUCTIVE PULMONARY DISEASE, UNSPECIFIED COPD TYPE: ICD-10-CM

## 2025-03-31 DIAGNOSIS — E11.3293 TYPE 2 DIABETES MELLITUS WITH BOTH EYES AFFECTED BY MILD NONPROLIFERATIVE RETINOPATHY WITHOUT MACULAR EDEMA, WITH LONG-TERM CURRENT USE OF INSULIN: Primary | ICD-10-CM

## 2025-03-31 DIAGNOSIS — Z13.6 ENCOUNTER FOR ABDOMINAL AORTIC ANEURYSM (AAA) SCREENING: ICD-10-CM

## 2025-03-31 DIAGNOSIS — Z12.11 ENCOUNTER FOR COLORECTAL CANCER SCREENING: ICD-10-CM

## 2025-03-31 PROCEDURE — 1126F AMNT PAIN NOTED NONE PRSNT: CPT | Mod: HCNC,CPTII,S$GLB,

## 2025-03-31 PROCEDURE — 3288F FALL RISK ASSESSMENT DOCD: CPT | Mod: HCNC,CPTII,S$GLB,

## 2025-03-31 PROCEDURE — 1101F PT FALLS ASSESS-DOCD LE1/YR: CPT | Mod: HCNC,CPTII,S$GLB,

## 2025-03-31 PROCEDURE — 3074F SYST BP LT 130 MM HG: CPT | Mod: HCNC,CPTII,S$GLB,

## 2025-03-31 PROCEDURE — 2024F 7 FLD RTA PHOTO EVC RTNOPTHY: CPT | Mod: HCNC,CPTII,S$GLB, | Performed by: OPTOMETRIST

## 2025-03-31 PROCEDURE — 1160F RVW MEDS BY RX/DR IN RCRD: CPT | Mod: HCNC,CPTII,S$GLB,

## 2025-03-31 PROCEDURE — 99999 PR PBB SHADOW E&M-EST. PATIENT-LVL V: CPT | Mod: PBBFAC,HCNC,,

## 2025-03-31 PROCEDURE — 3008F BODY MASS INDEX DOCD: CPT | Mod: HCNC,CPTII,S$GLB,

## 2025-03-31 PROCEDURE — 99215 OFFICE O/P EST HI 40 MIN: CPT | Mod: HCNC,S$GLB,,

## 2025-03-31 PROCEDURE — 92228 IMG RTA DETC/MNTR DS PHY/QHP: CPT | Mod: 26,HCNC,S$GLB, | Performed by: OPTOMETRIST

## 2025-03-31 PROCEDURE — 3078F DIAST BP <80 MM HG: CPT | Mod: HCNC,CPTII,S$GLB,

## 2025-03-31 PROCEDURE — 92228 IMG RTA DETC/MNTR DS PHY/QHP: CPT | Mod: HCNC,TC,59,S$GLB | Performed by: STUDENT IN AN ORGANIZED HEALTH CARE EDUCATION/TRAINING PROGRAM

## 2025-03-31 PROCEDURE — 1159F MED LIST DOCD IN RCRD: CPT | Mod: HCNC,CPTII,S$GLB,

## 2025-03-31 PROCEDURE — 4010F ACE/ARB THERAPY RXD/TAKEN: CPT | Mod: HCNC,CPTII,S$GLB,

## 2025-03-31 RX ORDER — CARVEDILOL 6.25 MG/1
6.25 TABLET ORAL 2 TIMES DAILY WITH MEALS
Qty: 180 TABLET | Refills: 1 | Status: SHIPPED | OUTPATIENT
Start: 2025-03-31

## 2025-03-31 RX ORDER — TAMSULOSIN HYDROCHLORIDE 0.4 MG/1
2 CAPSULE ORAL DAILY
Qty: 180 CAPSULE | Refills: 1 | Status: SHIPPED | OUTPATIENT
Start: 2025-03-31

## 2025-03-31 RX ORDER — CLOPIDOGREL BISULFATE 75 MG/1
75 TABLET ORAL DAILY
Qty: 90 TABLET | Refills: 1 | Status: SHIPPED | OUTPATIENT
Start: 2025-03-31

## 2025-03-31 RX ORDER — SEMAGLUTIDE 1.34 MG/ML
INJECTION, SOLUTION SUBCUTANEOUS
Qty: 9 ML | Refills: 1 | Status: SHIPPED | OUTPATIENT
Start: 2025-03-31

## 2025-03-31 RX ORDER — DULOXETIN HYDROCHLORIDE 60 MG/1
60 CAPSULE, DELAYED RELEASE ORAL DAILY
Qty: 90 CAPSULE | Refills: 1 | Status: SHIPPED | OUTPATIENT
Start: 2025-03-31

## 2025-03-31 RX ORDER — LISINOPRIL 20 MG/1
20 TABLET ORAL DAILY
Qty: 90 TABLET | Refills: 1 | Status: SHIPPED | OUTPATIENT
Start: 2025-03-31

## 2025-03-31 RX ORDER — ALBUTEROL SULFATE 90 UG/1
1-2 INHALANT RESPIRATORY (INHALATION) EVERY 6 HOURS PRN
Qty: 17 G | Refills: 0 | Status: SHIPPED | OUTPATIENT
Start: 2025-03-31 | End: 2026-03-31

## 2025-03-31 RX ORDER — METFORMIN HYDROCHLORIDE 1000 MG/1
1000 TABLET ORAL 2 TIMES DAILY WITH MEALS
Qty: 180 TABLET | Refills: 0 | Status: SHIPPED | OUTPATIENT
Start: 2025-03-31

## 2025-03-31 RX ORDER — ATORVASTATIN CALCIUM 80 MG/1
80 TABLET, FILM COATED ORAL DAILY
Qty: 90 TABLET | Refills: 1 | Status: SHIPPED | OUTPATIENT
Start: 2025-03-31

## 2025-03-31 RX ORDER — PANTOPRAZOLE SODIUM 40 MG/1
40 TABLET, DELAYED RELEASE ORAL
Qty: 90 TABLET | Refills: 3 | Status: CANCELLED | OUTPATIENT
Start: 2025-03-31

## 2025-03-31 NOTE — PROGRESS NOTES
Clinic Note  3/31/2025      Subjective:       Patient ID:  Jeramie is a 69 y.o. male being seen for an established visit.    Chief Complaint: Medication Refill    Patient presents to clinic today for medication refill.      Type 2 diabetes-currently on metformin and Ozempic, tolerating medication well denies any adverse effects to medication.  Lab Results       Component                Value               Date                       HGBA1C                   7.1 (H)             09/21/2024               HTN-blood pressure well controlled at visit today.  Tolerating carvedilol and lisinopril well, denies any adverse effects to medication    BILATERAL BKA-use of prosthetics and wheelchair patient reports he is able to transfer himself from bed to wheelchair without any concerns.    Congestive heart failure-blood pressure controlled today denies any recent weight gain or change in shortness of breath.  Has not seen a cardiologist in over a year per patient.  Lost to follow-up.  Needs a new referral.    Rheumatoid arthritis-has a pain specialist receives Norco prescription , reports Norco is effective.    Anxiety/Depression- taking Cymbalta denies any adverse effects to medication tolerating medication well denies any SI/HI.    COPD-patient rarely use rescue inhaler.  Patient reports he still currently smokes 1 pack a day not ready to quit at this time.    BPH-Flomax effective ,denies any current concerns    Hx of stroke- on Plavix, continues to smoke cigarettes, accepts risks     Health maintenance  Labs due   AAA screen due  Low-dose CT lung screen due  Colonoscopy due  Needs RSV vaccine-informed patient to go to local pharmacy for injection.    Patient denies any other concerns today         Review of Systems   Constitutional:  Negative for appetite change, fatigue, fever and unexpected weight change.   HENT:  Negative for hearing loss and sore throat.    Eyes:  Negative for pain and visual disturbance.   Respiratory:   Negative for cough, shortness of breath and wheezing.    Cardiovascular:  Negative for chest pain, palpitations and leg swelling.   Gastrointestinal:  Negative for abdominal pain, blood in stool, constipation, diarrhea, nausea and vomiting.   Genitourinary:  Negative for dysuria.   Musculoskeletal:  Negative for arthralgias.   Neurological:  Negative for dizziness and headaches.   Psychiatric/Behavioral:  Negative for suicidal ideas.         Medication List with Changes/Refills   Current Medications    ASPIRIN (ECOTRIN) 81 MG EC TABLET    Take 81 mg by mouth once daily.    BACLOFEN (LIORESAL) 20 MG TABLET    Take 20 mg by mouth 5 (five) times daily. Takes 5 times daily. Total of 120 mg    BLOOD SUGAR DIAGNOSTIC STRP    To check BG 3 times daily, to use with insurance preferred meter    BLOOD-GLUCOSE METER KIT    To check BG 3 times daily, to use with insurance preferred meter    FOLIC ACID (FOLVITE) 1 MG TABLET    TAKE ONE TABLET BY MOUTH DAILY    HYDROCODONE-ACETAMINOPHEN 10-325MG (NORCO)  MG TAB    Take 1 tablet by mouth every 6 (six) hours as needed.     LANCETS MISC    To check BG 3 times daily, to use with insurance preferred meter    NITROGLYCERIN (NITROSTAT) 0.4 MG SL TABLET    Place 1 tablet (0.4 mg total) under the tongue every 5 (five) minutes as needed for Chest pain.   Changed and/or Refilled Medications    Modified Medication Previous Medication    ALBUTEROL (PROVENTIL/VENTOLIN HFA) 90 MCG/ACTUATION INHALER albuterol (PROVENTIL/VENTOLIN HFA) 90 mcg/actuation inhaler       Inhale 1-2 puffs into the lungs every 6 (six) hours as needed for Shortness of Breath. Rescue    Inhale 1-2 puffs into the lungs every 6 (six) hours as needed for Shortness of Breath. Rescue    ATORVASTATIN (LIPITOR) 80 MG TABLET atorvastatin (LIPITOR) 80 MG tablet       Take 1 tablet (80 mg total) by mouth once daily.    TAKE ONE TABLET BY MOUTH DAILY    CARVEDILOL (COREG) 6.25 MG TABLET carvediloL (COREG) 6.25 MG tablet        Take 1 tablet (6.25 mg total) by mouth 2 (two) times daily with meals.    TAKE ONE TABLET BY MOUTH TWICE DAILY with meals    CLOPIDOGREL (PLAVIX) 75 MG TABLET clopidogreL (PLAVIX) 75 mg tablet       Take 1 tablet (75 mg total) by mouth once daily.    TAKE ONE TABLET BY MOUTH DAILY    DULOXETINE (CYMBALTA) 60 MG CAPSULE DULoxetine (CYMBALTA) 60 MG capsule       Take 1 capsule (60 mg total) by mouth once daily.    TAKE ONE CAPSULE BY MOUTH DAILY    EMPAGLIFLOZIN (JARDIANCE) 25 MG TABLET JARDIANCE 25 mg tablet       Take 1 tablet (25 mg total) by mouth once daily.    TAKE ONE TABLET BY MOUTH DAILY    LISINOPRIL (PRINIVIL,ZESTRIL) 20 MG TABLET lisinopriL (PRINIVIL,ZESTRIL) 20 MG tablet       Take 1 tablet (20 mg total) by mouth once daily.    TAKE ONE TABLET BY MOUTH ONCE DAILY    METFORMIN (GLUCOPHAGE) 1000 MG TABLET metFORMIN (GLUCOPHAGE) 1000 MG tablet       Take 1 tablet (1,000 mg total) by mouth 2 (two) times daily with meals.    TAKE ONE TABLET BY MOUTH TWICE DAILY WITH MEALS    SEMAGLUTIDE (OZEMPIC) 1 MG/DOSE (4 MG/3 ML) OZEMPIC 1 mg/dose (4 mg/3 mL)       INJECT ONE MG into THE SKIN EVERY 7 DAYS    INJECT ONE MG into THE SKIN EVERY 7 DAYS    TAMSULOSIN (FLOMAX) 0.4 MG CAP tamsulosin (FLOMAX) 0.4 mg Cap       Take 2 capsules (0.8 mg total) by mouth once daily.    TAKE TWO CAPSULES BY MOUTH DAILY   Discontinued Medications    MUPIROCIN (BACTROBAN) 2 % OINTMENT    Apply topically 3 (three) times daily.    MUPIROCIN (BACTROBAN) 2 % OINTMENT    Apply topically 2 (two) times daily.    PANTOPRAZOLE (PROTONIX) 40 MG TABLET    TAKE ONE TABLET BY MOUTH DAILY       Problem List[1]        Objective:      /60 (BP Location: Left arm, Patient Position: Sitting)   Pulse 63   Temp 97.4 °F (36.3 °C) (Oral)   Resp 16   Ht 6' (1.829 m)   BMI 18.99 kg/m²   Estimated body mass index is 18.99 kg/m² as calculated from the following:    Height as of this encounter: 6' (1.829 m).    Weight as of 8/11/24: 63.5 kg (140  lb).  Physical Exam  Vitals and nursing note reviewed.   Constitutional:       General: He is not in acute distress.  HENT:      Head: Atraumatic.   Cardiovascular:      Rate and Rhythm: Normal rate and regular rhythm.      Heart sounds: No murmur heard.  Pulmonary:      Effort: Pulmonary effort is normal. No respiratory distress.      Breath sounds: Wheezing (scattered) present. No rhonchi or rales.   Musculoskeletal:         General: Normal range of motion.      Right lower leg: No edema.      Left lower leg: No edema.      Comments: Bilateral BKA    Neurological:      Mental Status: He is alert and oriented to person, place, and time.   Psychiatric:         Mood and Affect: Mood normal.         Thought Content: Thought content normal.             Assessment and Plan:   -T2DM  Continue metformin and Ozempic     -HTN  Continue Lisinopril and Carvedilol     -Bilateral BKA  Prosthetics in place    -CHF   Cardiology Referral     -Hx of stroke   Continue Plavix     -COPD   Albuterol rescue as needed , smoking cessation encouraged     -BPH   Continue Flomax     -Anxiety/Depression   Continue Cymbalta    -RA   Continue follow ups with pain specialists    -Health maintenance  Labs ordered   AAA US ordered   Low-dose CT lung ordered   Colonoscopy ordered   RSV vaccine-patient to go to local pharmacy for injection.          Problem List Items Addressed This Visit       Chronic diastolic congestive heart failure    Current Assessment & Plan   Lost to f/u with cardiology, needs new referral  , denies active concerns          Relevant Orders    Ambulatory referral/consult to Cardiology    Moderate episode of recurrent major depressive disorder    Relevant Medications    DULoxetine (CYMBALTA) 60 MG capsule    Anxiety    Relevant Medications    DULoxetine (CYMBALTA) 60 MG capsule    Tobacco abuse    Relevant Orders    CT Chest Lung Screening Low Dose    S/P BKA (below knee amputation) bilateral    Current Assessment & Plan   Has  prosthetics for bilateral lower extremities, no active concerns today          COPD (chronic obstructive pulmonary disease)    Relevant Medications    albuterol (PROVENTIL/VENTOLIN HFA) 90 mcg/actuation inhaler    History of stroke    Relevant Medications    atorvastatin (LIPITOR) 80 MG tablet    carvediloL (COREG) 6.25 MG tablet    clopidogreL (PLAVIX) 75 mg tablet    Other Relevant Orders    Ambulatory referral/consult to Cardiology    Type 2 diabetes mellitus with both eyes affected by mild nonproliferative retinopathy without macular edema, with long-term current use of insulin - Primary    Relevant Medications    atorvastatin (LIPITOR) 80 MG tablet    empagliflozin (JARDIANCE) 25 mg tablet    metFORMIN (GLUCOPHAGE) 1000 MG tablet    semaglutide (OZEMPIC) 1 mg/dose (4 mg/3 mL)    Other Relevant Orders    CBC Auto Differential    Comprehensive Metabolic Panel    Hemoglobin A1C    Lipid Panel    TSH    Diabetic Eye Screening Photo (Completed)    Microalbumin/creatinine urine ratio    Rheumatoid arthritis of other site, unspecified whether rheumatoid factor present    Current Assessment & Plan   No active concerns , as needed pain relief           Other Visit Diagnoses         Benign essential HTN        Relevant Medications    carvediloL (COREG) 6.25 MG tablet    lisinopriL (PRINIVIL,ZESTRIL) 20 MG tablet    Other Relevant Orders    CBC Auto Differential    Comprehensive Metabolic Panel    Hemoglobin A1C    Lipid Panel    TSH    Ambulatory referral/consult to Cardiology      Benign prostatic hyperplasia, unspecified whether lower urinary tract symptoms present        Relevant Medications    tamsulosin (FLOMAX) 0.4 mg Cap    Other Relevant Orders    PSA, Screening      Encounter for abdominal aortic aneurysm (AAA) screening        Relevant Orders    US Abdominal Aorta      Encounter for colorectal cancer screening        Relevant Orders    Case Request Endoscopy: COLONOSCOPY, SCREENING, LOW RISK PATIENT (Completed)       Nicotine dependence, cigarettes, uncomplicated        Relevant Orders    CT Chest Lung Screening Low Dose      Encounter for screening for malignant neoplasm of prostate        Relevant Orders    PSA, Screening            Reviewed risks, benefits, and appropriate medication use prescribed  Discussed LS changes as appropriate   Reviewed cancer screening guidelines   Advised to keep speciality and follow up appointments as scheduled   Reviewed ER precautions   Verbalized understanding and is agreeable to plan      Follow Up:   Follow up in about 3 months (around 6/30/2025), or if symptoms worsen or fail to improve.    Other Orders Placed This Visit:  Orders Placed This Encounter   Procedures    CT Chest Lung Screening Low Dose    US Abdominal Aorta    CBC Auto Differential    Comprehensive Metabolic Panel    Hemoglobin A1C    Lipid Panel    TSH    PSA, Screening    Microalbumin/creatinine urine ratio    Ambulatory referral/consult to Cardiology    Diabetic Eye Screening Photo           Addi Hernandez, [id number]              [1]   Patient Active Problem List  Diagnosis    Type 2 diabetes mellitus with hyperlipidemia    Essential hypertension    Hyperlipidemia    Coronary artery disease    S/P drug eluting coronary stent placement    Chronic diastolic congestive heart failure    Neuropathy    B12 deficiency    Moderate episode of recurrent major depressive disorder    Anxiety    Tobacco abuse    DDD (degenerative disc disease), lumbar    S/P BKA (below knee amputation) bilateral    Posterior vitreous detachment of both eyes    PAD (peripheral artery disease)    Microalbuminuria due to type 2 diabetes mellitus    COPD (chronic obstructive pulmonary disease)    Impaired mobility    Type 2 diabetes mellitus with hyperglycemia, with long-term current use of insulin    Hyperglycemia    Non-intractable vomiting with nausea    Medical non-compliance    Gastroesophageal reflux disease without esophagitis    Asymptomatic  stenosis of right carotid artery: 86% right CCA, by US 7/17/17    Bilateral carotid bruits    History of stroke    Type 2 diabetes mellitus with both eyes affected by mild nonproliferative retinopathy without macular edema, with long-term current use of insulin    Rheumatoid arthritis of other site, unspecified whether rheumatoid factor present

## 2025-03-31 NOTE — PROGRESS NOTES
Jordan Mcpherson is a 69 y.o. male here for a diabetic eye screening with non-dilated fundus photos per A[facility].    Patient cooperative?: Yes  Small pupils?: No  Last eye exam: 2023    For exam results, see Encounter Report.

## 2025-04-01 ENCOUNTER — TELEPHONE (OUTPATIENT)
Dept: PRIMARY CARE CLINIC | Facility: CLINIC | Age: 70
End: 2025-04-01
Payer: MEDICARE

## 2025-04-01 NOTE — TELEPHONE ENCOUNTER
Called home phone # on file. Hugh (pts son answered) informed of pts eye camera results and recommendations per [facility]. Hugh voiced understanding and stated he will inform pt. Also advised to call the clinic for any questions or concerns, voiced understanding.

## 2025-04-01 NOTE — TELEPHONE ENCOUNTER
----- Message from PRATEEK Mcdonald sent at 3/31/2025  2:51 PM CDT -----  Please let patient know   Eye camera screening for diabetic retinopathy was okay. Right eye fine and left shows mild retinopathy. Can be repeated in one year. Please continue to follow up with primary eye care as well.   Thank you   ----- Message -----  From: Deb Jimenez OD  Sent: 3/31/2025   2:25 PM CDT  To: PRATEEK Crespo

## 2025-04-07 ENCOUNTER — TELEPHONE (OUTPATIENT)
Dept: GASTROENTEROLOGY | Facility: CLINIC | Age: 70
End: 2025-04-07
Payer: MEDICARE

## 2025-04-14 ENCOUNTER — TELEPHONE (OUTPATIENT)
Dept: GASTROENTEROLOGY | Facility: CLINIC | Age: 70
End: 2025-04-14
Payer: MEDICARE

## 2025-06-27 DIAGNOSIS — E11.3293 TYPE 2 DIABETES MELLITUS WITH BOTH EYES AFFECTED BY MILD NONPROLIFERATIVE RETINOPATHY WITHOUT MACULAR EDEMA, WITH LONG-TERM CURRENT USE OF INSULIN: ICD-10-CM

## 2025-06-27 DIAGNOSIS — Z79.4 TYPE 2 DIABETES MELLITUS WITH BOTH EYES AFFECTED BY MILD NONPROLIFERATIVE RETINOPATHY WITHOUT MACULAR EDEMA, WITH LONG-TERM CURRENT USE OF INSULIN: ICD-10-CM

## 2025-06-27 RX ORDER — SEMAGLUTIDE 1.34 MG/ML
INJECTION, SOLUTION SUBCUTANEOUS
Qty: 9 ML | Refills: 0 | Status: SHIPPED | OUTPATIENT
Start: 2025-06-27

## 2025-06-27 NOTE — TELEPHONE ENCOUNTER
Refill Routing Note   Medication(s) are not appropriate for processing by [facility] for the following reason(s):        ED/Hospital Visit since last OV with provider  Required labs outdated  Patient not seen by provider within 15 months    [facility] action(s):  Defer               Appointments  past 12m or future 3m with PCP    Date Provider   Last Visit   10/16/2023 Mike Morley MD   Next Visit   6/30/2025 Mike Morley MD   ED visits in past 90 days: 0        Note composed:5:29 PM 06/27/2025

## 2025-06-27 NOTE — TELEPHONE ENCOUNTER
No care due was identified.  [facility] Embedded Care Due Messages. Reference number: [id number].   6/27/2025 9:25:09 AM CDT

## 2025-06-30 ENCOUNTER — OFFICE VISIT (OUTPATIENT)
Dept: PRIMARY CARE CLINIC | Facility: CLINIC | Age: 70
End: 2025-06-30
Payer: MEDICARE

## 2025-06-30 VITALS
BODY MASS INDEX: 18.17 KG/M2 | TEMPERATURE: 98 F | DIASTOLIC BLOOD PRESSURE: 70 MMHG | HEIGHT: 72 IN | HEART RATE: 91 BPM | SYSTOLIC BLOOD PRESSURE: 130 MMHG | RESPIRATION RATE: 18 BRPM | OXYGEN SATURATION: 96 % | WEIGHT: 134.13 LBS

## 2025-06-30 DIAGNOSIS — Z12.12 ENCOUNTER FOR COLORECTAL CANCER SCREENING: ICD-10-CM

## 2025-06-30 DIAGNOSIS — H10.13 ALLERGIC CONJUNCTIVITIS OF BOTH EYES: ICD-10-CM

## 2025-06-30 DIAGNOSIS — I25.10 CORONARY ARTERY DISEASE INVOLVING NATIVE CORONARY ARTERY OF NATIVE HEART WITHOUT ANGINA PECTORIS: ICD-10-CM

## 2025-06-30 DIAGNOSIS — Z12.11 ENCOUNTER FOR COLORECTAL CANCER SCREENING: ICD-10-CM

## 2025-06-30 DIAGNOSIS — F17.210 CIGARETTE NICOTINE DEPENDENCE WITHOUT COMPLICATION: ICD-10-CM

## 2025-06-30 DIAGNOSIS — E11.3293 TYPE 2 DIABETES MELLITUS WITH BOTH EYES AFFECTED BY MILD NONPROLIFERATIVE RETINOPATHY WITHOUT MACULAR EDEMA, WITHOUT LONG-TERM CURRENT USE OF INSULIN: ICD-10-CM

## 2025-06-30 DIAGNOSIS — C44.609 SKIN CANCER OF ARM, LEFT: ICD-10-CM

## 2025-06-30 DIAGNOSIS — Z89.512 S/P BKA (BELOW KNEE AMPUTATION) BILATERAL: ICD-10-CM

## 2025-06-30 DIAGNOSIS — J44.9 CHRONIC OBSTRUCTIVE PULMONARY DISEASE, UNSPECIFIED COPD TYPE: ICD-10-CM

## 2025-06-30 DIAGNOSIS — E78.5 HYPERLIPIDEMIA, UNSPECIFIED HYPERLIPIDEMIA TYPE: ICD-10-CM

## 2025-06-30 DIAGNOSIS — R39.14 BENIGN PROSTATIC HYPERPLASIA WITH INCOMPLETE BLADDER EMPTYING: Primary | ICD-10-CM

## 2025-06-30 DIAGNOSIS — N40.1 BENIGN PROSTATIC HYPERPLASIA WITH INCOMPLETE BLADDER EMPTYING: Primary | ICD-10-CM

## 2025-06-30 DIAGNOSIS — Z12.5 PROSTATE CANCER SCREENING: ICD-10-CM

## 2025-06-30 DIAGNOSIS — Z89.511 S/P BKA (BELOW KNEE AMPUTATION) BILATERAL: ICD-10-CM

## 2025-06-30 DIAGNOSIS — Z12.2 SCREENING FOR LUNG CANCER: ICD-10-CM

## 2025-06-30 PROBLEM — R11.2 NON-INTRACTABLE VOMITING WITH NAUSEA: Status: RESOLVED | Noted: 2022-05-09 | Resolved: 2025-06-30

## 2025-06-30 PROBLEM — R73.9 HYPERGLYCEMIA: Status: RESOLVED | Noted: 2022-05-08 | Resolved: 2025-06-30

## 2025-06-30 PROCEDURE — 99999 PR PBB SHADOW E&M-EST. PATIENT-LVL V: CPT | Mod: PBBFAC,,, | Performed by: FAMILY MEDICINE

## 2025-06-30 RX ORDER — FINASTERIDE 5 MG/1
5 TABLET, FILM COATED ORAL DAILY
Qty: 90 TABLET | Refills: 3 | Status: SHIPPED | OUTPATIENT
Start: 2025-06-30

## 2025-06-30 RX ORDER — CROMOLYN SODIUM 40 MG/ML
1 SOLUTION/ DROPS OPHTHALMIC 4 TIMES DAILY
Qty: 10 ML | Refills: 5 | Status: SHIPPED | OUTPATIENT
Start: 2025-06-30

## 2025-06-30 NOTE — PROGRESS NOTES
Assessment:       1. Benign prostatic hyperplasia with incomplete bladder emptying    2. Chronic obstructive pulmonary disease, unspecified COPD type    3. S/P BKA (below knee amputation) bilateral    4. Cigarette nicotine dependence without complication    5. Skin cancer of arm, left    6. Coronary artery disease involving native coronary artery of native heart without angina pectoris    7. Hyperlipidemia, unspecified hyperlipidemia type    8. Type 2 diabetes mellitus with both eyes affected by mild nonproliferative retinopathy without macular edema, without long-term current use of insulin    9. Prostate cancer screening    10. Encounter for colorectal cancer screening    11. Screening for lung cancer    12. Allergic conjunctivitis of both eyes         Plan:       Benign prostatic hyperplasia with incomplete bladder emptying  -     finasteride (PROSCAR) 5 mg tablet; Take 1 tablet (5 mg total) by mouth once daily.  Dispense: 90 tablet; Refill: 3    Chronic obstructive pulmonary disease, unspecified COPD type    S/P BKA (below knee amputation) bilateral    Cigarette nicotine dependence without complication  -     CT Chest Lung Screening Low Dose; Future; Expected date: 06/30/2025    Skin cancer of arm, left  -     Ambulatory referral/consult to Dermatology; Future; Expected date: 07/07/2025    Coronary artery disease involving native coronary artery of native heart without angina pectoris    Hyperlipidemia, unspecified hyperlipidemia type  -     CBC Auto Differential; Future; Expected date: 06/30/2025  -     Comprehensive Metabolic Panel; Future; Expected date: 06/30/2025  -     Lipid Panel; Future; Expected date: 06/30/2025    Type 2 diabetes mellitus with both eyes affected by mild nonproliferative retinopathy without macular edema, without long-term current use of insulin  -     Hemoglobin A1C; Future; Expected date: 06/30/2025  -     Microalbumin/Creatinine Ratio, Urine; Future; Expected date:  06/30/2025    Prostate cancer screening  -     PSA, Screening; Future; Expected date: 06/30/2025    Encounter for colorectal cancer screening  -     Case Request Endoscopy: COLONOSCOPY, SCREENING, LOW RISK PATIENT    Screening for lung cancer  -     CT Chest Lung Screening Low Dose; Future; Expected date: 06/30/2025    Allergic conjunctivitis of both eyes  -     cromolyn (OPTICROM) 4 % ophthalmic solution; Place 1 drop into both eyes 4 (four) times daily.  Dispense: 10 mL; Refill: 5      Assessment & Plan    E11.3293 Type 2 diabetes mellitus with both eyes affected by mild nonproliferative retinopathy without macular edema, without long-term current use of insulin  J44.9 Chronic obstructive pulmonary disease, unspecified COPD type  N40.1, R39.14 Benign prostatic hyperplasia with incomplete bladder emptying  Z89.512, Z89.511 S/P BKA (below knee amputation) bilateral  F17.210 Cigarette nicotine dependence without complication  C44.609 Skin cancer of arm, left  I25.10 Coronary artery disease involving native coronary artery of native heart without angina pectoris  E78.5 Hyperlipidemia, unspecified hyperlipidemia type  Z12.5 Prostate cancer screening  Z12.11, Z12.12 Encounter for colorectal cancer screening  Z12.2 Screening for lung cancer  H10.13 Allergic conjunctivitis of both eyes    - Assessed mobility issues and pain, particularly in left wrist.  - Evaluated urinary symptoms and current prostate medication efficacy.  - Considered skin cancer lesion on left deltoid, noting reluctance for extensive excision.  - Reviewed cardiovascular health and smoking status.  - Evaluated diabetes management, clarifying Ozempic use, not insulin.  - Determined need for comprehensive health screenings due to risk factors.    TYPE 2 DIABETES MELLITUS WITH BOTH EYES AFFECTED BY MILD NONPROLIFERATIVE RETINOPATHY WITHOUT MACULAR EDEMA, WITHOUT LONG-TERM CURRENT USE OF INSULIN:   Ordered comprehensive labs.    CHRONIC OBSTRUCTIVE  PULMONARY DISEASE, UNSPECIFIED COPD TYPE:   Ordered CT Lungs.    BENIGN PROSTATIC HYPERPLASIA WITH INCOMPLETE BLADDER EMPTYING:   Started finasteride to be taken along with tamsulosin.    CORONARY ARTERY DISEASE INVOLVING NATIVE CORONARY ARTERY OF NATIVE HEART WITHOUT ANGINA PECTORIS:   Ordered Aortic US.    HYPERLIPIDEMIA, UNSPECIFIED HYPERLIPIDEMIA TYPE:   Ordered comprehensive labs.    ENCOUNTER FOR COLORECTAL CANCER SCREENING:   Consider colonoscopy.    SCREENING FOR LUNG CANCER:   Ordered CT Lungs.    ALLERGIC CONJUNCTIVITIS OF BOTH EYES:   Prescribed Cromoly eye drops for burning and watery eyes.       Medication List with Changes/Refills   New Medications    CROMOLYN (OPTICROM) 4 % OPHTHALMIC SOLUTION    Place 1 drop into both eyes 4 (four) times daily.    FINASTERIDE (PROSCAR) 5 MG TABLET    Take 1 tablet (5 mg total) by mouth once daily.   Current Medications    ALBUTEROL (PROVENTIL/VENTOLIN HFA) 90 MCG/ACTUATION INHALER    Inhale 1-2 puffs into the lungs every 6 (six) hours as needed for Shortness of Breath. Rescue    ASPIRIN (ECOTRIN) 81 MG EC TABLET    Take 81 mg by mouth once daily.    ATORVASTATIN (LIPITOR) 80 MG TABLET    Take 1 tablet (80 mg total) by mouth once daily.    BACLOFEN (LIORESAL) 20 MG TABLET    Take 20 mg by mouth 5 (five) times daily. Takes 5 times daily. Total of 120 mg    BLOOD SUGAR DIAGNOSTIC STRP    To check BG 3 times daily, to use with insurance preferred meter    BLOOD-GLUCOSE METER KIT    To check BG 3 times daily, to use with insurance preferred meter    CARVEDILOL (COREG) 6.25 MG TABLET    Take 1 tablet (6.25 mg total) by mouth 2 (two) times daily with meals.    CLOPIDOGREL (PLAVIX) 75 MG TABLET    Take 1 tablet (75 mg total) by mouth once daily.    DULOXETINE (CYMBALTA) 60 MG CAPSULE    Take 1 capsule (60 mg total) by mouth once daily.    EMPAGLIFLOZIN (JARDIANCE) 25 MG TABLET    Take 1 tablet (25 mg total) by mouth once daily.    FOLIC ACID (FOLVITE) 1 MG TABLET    TAKE ONE  "TABLET BY MOUTH DAILY    HYDROCODONE-ACETAMINOPHEN 10-325MG (NORCO)  MG TAB    Take 1 tablet by mouth every 6 (six) hours as needed.     LANCETS MISC    To check BG 3 times daily, to use with insurance preferred meter    LISINOPRIL (PRINIVIL,ZESTRIL) 20 MG TABLET    Take 1 tablet (20 mg total) by mouth once daily.    METFORMIN (GLUCOPHAGE) 1000 MG TABLET    Take 1 tablet (1,000 mg total) by mouth 2 (two) times daily with meals.    NITROGLYCERIN (NITROSTAT) 0.4 MG SL TABLET    Place 1 tablet (0.4 mg total) under the tongue every 5 (five) minutes as needed for Chest pain.    OZEMPIC 1 MG/DOSE (4 MG/3 ML)    INJECT ONE MG into THE SKIN EVERY 7 DAYS    TAMSULOSIN (FLOMAX) 0.4 MG CAP    Take 2 capsules (0.8 mg total) by mouth once daily.         Subjective:    Patient ID: Jordan Mcpherson is a 69 y.o. male.  Chief Complaint: Follow-up (3 month)    HPI  History of Present Illness    CHIEF COMPLAINT:  Patient presents today with back pain.    BACK PAIN AND MOBILITY:  He reports severe back pain with significant mobility limitations. He is currently unable to walk independently, describing that he can "barely get from here to there." He moves within his home by sliding and hopping. He transfers from bed by sliding and sitting, using similar techniques to access the bathroom and shower bench. Although he has a walker at home, he is currently unable to use it effectively. He denies any falls or injuries in the past five months. His mobility restrictions significantly impact his daily functional activities.    LEFT UPPER EXTREMITY:  He reports significant left hand and wrist weakness without recall of specific injury or precipitating event. He describes feeling that his hand could "break" when weight-bearing, experiencing profound instability. He requires bilateral hand use for simple tasks like setting down a spoon and cannot apply weight or support himself using the left hand.    UROLOGIC:  He reports a 5-year history of " urinary symptoms including weak stream, incomplete bladder emptying, and frequent urination with minimal output. Despite taking Tamsulosin 0.4mg, symptoms persist with inadequate management.    OPHTHALMOLOGIC:  He reports bilateral eye burning with constant tearing. He notes significant visual impairment despite current glasses prescription.    MEDICAL HISTORY:  History significant for multiple previous strokes, skin cancer on left deltoid for 3-5 years, and COPD with previous pulmonary function testing.    MEDICATIONS:  Current medications include Tamsulosin 0.4mg, Ozempic weekly, and Metformin twice daily.    SOCIAL HISTORY:  He currently smokes 2 packs of cigarettes daily. He quit alcohol consumption 10 years ago.      ROS:  Eyes: +eye watering, +loss of vision  Respiratory: -difficulty breathing, -shortness of breath  Cardiovascular: -chest pain  Genitourinary: +difficulty urinating  Musculoskeletal: +back pain, +limb pain, +muscle weakness, +pain with movement  Neurological: +weakness, +numbness       Review of Systems    Objective:      Vitals:    06/30/25 1314   BP: 130/70   BP Location: Left arm   Patient Position: Sitting   Pulse: 91   Resp: 18   Temp: 97.6 °F (36.4 °C)   TempSrc: Oral   SpO2: 96%   Weight: 60.9 kg (134 lb 2.4 oz)   Height: 6' (1.829 m)     BP Readings from Last 5 Encounters:   06/30/25 130/70   03/31/25 120/60   09/18/24 118/68   08/11/24 (!) 172/79   05/13/24 (!) 163/73     Wt Readings from Last 5 Encounters:   06/30/25 60.9 kg (134 lb 2.4 oz)   08/11/24 63.5 kg (140 lb)   05/13/24 63.5 kg (140 lb)   10/17/23 63 kg (139 lb)   10/16/23 63.3 kg (139 lb 7.1 oz)     Physical Exam  Physical Exam    Vitals: Weight: 120 lbs.  General: Sarcopenic. No acute distress.  Eyes: EOMI. Sclerae anicteric.  HENT: Normocephalic. Atraumatic. Nares patent. Moist oral mucosa.  Cardiovascular: Regular rate. Regular rhythm. No murmurs. No rubs. No gallops. Normal S1, S2.  Respiratory: Normal respiratory effort.  Clear to auscultation bilaterally. No rales. No rhonchi. No wheezing.  Musculoskeletal: No  obvious deformity.  Extremities: No lower extremity edema.  Neurological: Alert & oriented x3. No slurred speech.   Psychiatric: Normal mood. Normal affect. Good insight. Good judgment.  Skin: Warm. Dry.  Hyperpigmented plaque-like lesion on left deltoid.         Lab Results   Component Value Date    WBC 18.9 (H) 10/03/2024    HGB 8.8 (L) 10/03/2024    HCT 26.5 (L) 10/03/2024     08/11/2024    CHOL 105 (L) 08/04/2023    TRIG 130 08/04/2023    HDL 38 (L) 08/04/2023    ALT 6 09/19/2024    AST 8 09/19/2024     10/05/2024    K 3.7 10/05/2024     08/11/2024    CREATININE 0.60 (L) 10/05/2024    BUN 13.0 10/05/2024    CO2 23 (L) 10/05/2024    TSH 1.98 07/13/2018    PSA 0.56 08/04/2023    INR 1.2 10/03/2024    HGBA1C 7.1 (H) 09/21/2024      This note was generated with the assistance of ambient listening technology. Verbal consent was obtained by the patient and accompanying visitor(s) for the recording of patient appointment to facilitate this note. I attest to having reviewed and edited the generated note for accuracy, though some syntax or spelling errors may persist. Please contact the author of this note for any clarification.

## 2025-07-03 ENCOUNTER — RESULTS FOLLOW-UP (OUTPATIENT)
Dept: PRIMARY CARE CLINIC | Facility: CLINIC | Age: 70
End: 2025-07-03

## 2025-07-03 DIAGNOSIS — E11.3293 TYPE 2 DIABETES MELLITUS WITH BOTH EYES AFFECTED BY MILD NONPROLIFERATIVE RETINOPATHY WITHOUT MACULAR EDEMA, WITH LONG-TERM CURRENT USE OF INSULIN: ICD-10-CM

## 2025-07-03 DIAGNOSIS — Z79.4 TYPE 2 DIABETES MELLITUS WITH BOTH EYES AFFECTED BY MILD NONPROLIFERATIVE RETINOPATHY WITHOUT MACULAR EDEMA, WITH LONG-TERM CURRENT USE OF INSULIN: ICD-10-CM

## 2025-07-03 RX ORDER — METFORMIN HYDROCHLORIDE 1000 MG/1
1000 TABLET ORAL 2 TIMES DAILY WITH MEALS
Qty: 180 TABLET | Refills: 1 | Status: SHIPPED | OUTPATIENT
Start: 2025-07-03

## 2025-07-03 NOTE — TELEPHONE ENCOUNTER
Copied from CRM #[id number]. Topic: Medications - Medication Refill  >> Jul 3, 2025  9:48 AM Jennifer wrote:  Requesting an RX refill or new RX.    Is this a refill or new RX:     RX name and strength (copy/paste from chart): metFORMIN (GLUCOPHAGE) 1000 MG tablet 180 tablet     Is this a 30 day or 90 day RX:     Pharmacy name and phone # (copy/paste from chart):    [email or web address] [place] - [address] [place] [address]  Phone: [phone] Fax: [phone]       Who called and call back number:[phone]    The doctors have asked that we provide their patients with the following 2 reminders -- prescription refills can take up to 72 hours, and a friendly reminder that in the future you can use your [facility] account to request refills:

## 2025-07-03 NOTE — TELEPHONE ENCOUNTER
Copied from CRM #[id number]. Topic: Medications - Medication Status Check   >> Jul 3, 2025  1:01 PM Kasey wrote:  .1MEDICALADVICE     Patient is calling for Medical Advice regarding:Status check     How long has patient had these symptoms:NA    Pharmacy name and phone#:[email or web address] - [place] - [address] [place] [address]  Phone: [phone] Fax: [phone]       Patient wants a call back or thru [facility], provide patient's call back phone number: Call pt back at:[phone]    Comments: pt said that he is calling in regards to needing to check the status of a refill request for his metFORMIN (GLUCOPHAGE) 1000 MG tablet    Please advise patient replies from provider may take up to 48 hours.

## 2025-07-03 NOTE — TELEPHONE ENCOUNTER
Refill Decision Note   Jordan Mcpherson  is requesting a refill authorization.  Brief Assessment and Rationale for Refill:  Approve     Medication Therapy Plan:         Comments:     Note composed:5:45 PM 07/03/2025

## 2025-07-03 NOTE — TELEPHONE ENCOUNTER
No care due was identified.  [facility] Embedded Care Due Messages. Reference number: [id number].   7/03/2025 10:14:56 AM CDT

## 2025-07-14 ENCOUNTER — TELEPHONE (OUTPATIENT)
Dept: PRIMARY CARE CLINIC | Facility: CLINIC | Age: 70
End: 2025-07-14
Payer: MEDICARE

## 2025-07-14 NOTE — TELEPHONE ENCOUNTER
Copied from CRM #[id number]. Topic: General Inquiry - Return Call  >> Jul 14, 2025  3:22 PM Monica wrote:  Type: Returning a call    Who left a message?n/a    When did the practice call?7/14/25    Does patient know what this is regarding:possibly test results    Who called and best call back number:[phone]     Would the patient rather a call back or a response via [facility]? Call     Comments:discussed results with pt , pt catherine

## 2025-07-14 NOTE — TELEPHONE ENCOUNTER
Copied from CRM #[id number]. Topic: General Inquiry - Return Call  >> Jul 14, 2025 12:11 PM Isabel wrote:  Patient is returning a phone call.  Who left a message for the patient: n/a   Does patient know what this is regarding:  results  Would you like a call back, or a response through your [facility] portal?:   [phone]  Comments: returned call , no answer . Pura chou

## 2025-07-22 ENCOUNTER — OFFICE VISIT (OUTPATIENT)
Dept: CARDIOLOGY | Facility: CLINIC | Age: 70
End: 2025-07-22
Payer: MEDICARE

## 2025-07-22 VITALS
SYSTOLIC BLOOD PRESSURE: 100 MMHG | WEIGHT: 120 LBS | DIASTOLIC BLOOD PRESSURE: 66 MMHG | HEIGHT: 72 IN | HEART RATE: 83 BPM | BODY MASS INDEX: 16.25 KG/M2 | OXYGEN SATURATION: 98 %

## 2025-07-22 DIAGNOSIS — J44.9 CHRONIC OBSTRUCTIVE PULMONARY DISEASE, UNSPECIFIED COPD TYPE: Primary | ICD-10-CM

## 2025-07-22 DIAGNOSIS — I25.10 CORONARY ARTERY DISEASE INVOLVING NATIVE CORONARY ARTERY OF NATIVE HEART WITHOUT ANGINA PECTORIS: ICD-10-CM

## 2025-07-22 DIAGNOSIS — F17.210 CIGARETTE NICOTINE DEPENDENCE WITHOUT COMPLICATION: ICD-10-CM

## 2025-07-22 DIAGNOSIS — E78.5 HYPERLIPIDEMIA LDL GOAL <70: ICD-10-CM

## 2025-07-22 DIAGNOSIS — E78.2 MIXED HYPERLIPIDEMIA: ICD-10-CM

## 2025-07-22 DIAGNOSIS — Z95.5 S/P DRUG ELUTING CORONARY STENT PLACEMENT: ICD-10-CM

## 2025-07-22 DIAGNOSIS — I65.21 ASYMPTOMATIC STENOSIS OF RIGHT CAROTID ARTERY: ICD-10-CM

## 2025-07-22 DIAGNOSIS — Z86.73 HISTORY OF STROKE: ICD-10-CM

## 2025-07-22 DIAGNOSIS — I10 ESSENTIAL HYPERTENSION: ICD-10-CM

## 2025-07-22 DIAGNOSIS — Z79.4 TYPE 2 DIABETES MELLITUS WITH HYPERGLYCEMIA, WITH LONG-TERM CURRENT USE OF INSULIN: ICD-10-CM

## 2025-07-22 DIAGNOSIS — E11.65 TYPE 2 DIABETES MELLITUS WITH HYPERGLYCEMIA, WITH LONG-TERM CURRENT USE OF INSULIN: ICD-10-CM

## 2025-07-22 DIAGNOSIS — I50.32 CHRONIC DIASTOLIC CONGESTIVE HEART FAILURE: ICD-10-CM

## 2025-07-22 DIAGNOSIS — I10 BENIGN ESSENTIAL HTN: ICD-10-CM

## 2025-07-22 DIAGNOSIS — K21.9 GASTROESOPHAGEAL REFLUX DISEASE WITHOUT ESOPHAGITIS: ICD-10-CM

## 2025-07-22 DIAGNOSIS — Z01.818 PREOPERATIVE CLEARANCE: ICD-10-CM

## 2025-07-22 DIAGNOSIS — I73.9 PAD (PERIPHERAL ARTERY DISEASE): ICD-10-CM

## 2025-07-22 LAB
OHS QRS DURATION: 96 MS
OHS QTC CALCULATION: 463 MS

## 2025-07-22 PROCEDURE — 99999 PR PBB SHADOW E&M-EST. PATIENT-LVL V: CPT | Mod: PBBFAC,HCNC,,

## 2025-07-22 PROCEDURE — 93010 ELECTROCARDIOGRAM REPORT: CPT | Mod: HCNC,S$GLB,, | Performed by: INTERNAL MEDICINE

## 2025-07-22 RX ORDER — EZETIMIBE 10 MG/1
10 TABLET ORAL DAILY
Qty: 90 TABLET | Refills: 3 | Status: SHIPPED | OUTPATIENT
Start: 2025-07-22 | End: 2026-07-22

## 2025-07-22 NOTE — PROGRESS NOTES
[facility] - Cardiology [address]  Cardiology Clinic Note      7/22/2025  4:05 PM    Problem list  Problem List[1]    History of Present Illness    CHIEF COMPLAINT:  Patient presents today for pre-operative clearance for colonoscopy    CARDIOVASCULAR:  He has a cardiac history significant for five stents placed at [facility] 2 years ago, including two cardiac stents and bilateral carotid stents. He currently denies chest pain and dyspnea. LDL cholesterol was 121 in June, above the target goal of less than 70.    NEUROLOGICAL:  He has a history of three strokes with residual loss of sensation in his hands from the most recent event. He denies current headaches, lightheadedness, dizziness, or new numbness/tingling in arms.    SOCIAL HISTORY:  He is a current smoker with 1 pack per day consumption, reduced from 2 packs daily. His smoking history spans 52 years, having started at age 17 and has no desire to quit smoking.    REVIEW OF SYSTEMS:  He denies any bleeding issues, including hematochezia, hematuria, or hemoptysis.      ROS:  General: -fever, -chills, -fatigue, -weight gain, -weight loss  Eyes: -vision changes, -redness, -discharge  ENT: -ear pain, -nasal congestion, -sore throat  Cardiovascular: -chest pain, -palpitations, -lower extremity edema  Respiratory: -cough, -shortness of breath  Gastrointestinal: -abdominal pain, -nausea, -vomiting, -diarrhea, -constipation, -blood in stool  Genitourinary: -dysuria, -hematuria, -frequency  Musculoskeletal: -joint pain, -muscle pain  Skin: -rash, -lesion  Neurological: -headache, -dizziness, +numbness, -tingling  Psychiatric: -anxiety, -depression, -sleep difficulty           Medications  Current Medications[2]   Prior to Admission medications    Medication Sig Start Date End Date Taking? Authorizing Provider   aspirin (ECOTRIN) 81 MG EC tablet Take 81 mg by mouth once daily.   Yes Provider, Historical   atorvastatin (LIPITOR) 80 MG tablet Take 1 tablet (80 mg  total) by mouth once daily. 3/31/25  Yes Addi Hernandez FNP-BC   baclofen (LIORESAL) 20 MG tablet Take 20 mg by mouth 5 (five) times daily. Takes 5 times daily. Total of 120 mg 7/6/18  Yes Provider, Historical   carvediloL (COREG) 6.25 MG tablet Take 1 tablet (6.25 mg total) by mouth 2 (two) times daily with meals. 3/31/25  Yes Addi Hernandez FNP-BC   clopidogreL (PLAVIX) 75 mg tablet Take 1 tablet (75 mg total) by mouth once daily. 3/31/25  Yes Addi Hernandez FNP-BC   DULoxetine (CYMBALTA) 60 MG capsule Take 1 capsule (60 mg total) by mouth once daily. 3/31/25  Yes Addi Hernandez FNP-BC   empagliflozin (JARDIANCE) 25 mg tablet Take 1 tablet (25 mg total) by mouth once daily. 3/31/25  Yes Addi Hernandez FNP-BC   finasteride (PROSCAR) 5 mg tablet Take 1 tablet (5 mg total) by mouth once daily. 6/30/25  Yes Mike Morley MD   folic acid (FOLVITE) 1 MG tablet TAKE ONE TABLET BY MOUTH DAILY 2/15/21  Yes Shawn Marino MD   hydrocodone-acetaminophen 10-325mg (NORCO)  mg Tab Take 1 tablet by mouth every 6 (six) hours as needed.  6/12/17  Yes Provider, Historical   lisinopriL (PRINIVIL,ZESTRIL) 20 MG tablet Take 1 tablet (20 mg total) by mouth once daily. 3/31/25  Yes Addi Hernandez FNP-BC   metFORMIN (GLUCOPHAGE) 1000 MG tablet Take 1 tablet (1,000 mg total) by mouth 2 (two) times daily with meals. 7/3/25  Yes Mike Morley MD   nitroGLYCERIN (NITROSTAT) 0.4 MG SL tablet Place 1 tablet (0.4 mg total) under the tongue every 5 (five) minutes as needed for Chest pain. 8/3/22  Yes Jesus Ricardo MD   OZEMPIC 1 mg/dose (4 mg/3 mL) INJECT ONE MG into THE SKIN EVERY 7 DAYS 6/27/25  Yes Mike Morley MD   tamsulosin (FLOMAX) 0.4 mg Cap Take 2 capsules (0.8 mg total) by mouth once daily. 3/31/25  Yes Addi Hernandez, SALVADORP-BC   albuterol (PROVENTIL/VENTOLIN HFA) 90 mcg/actuation inhaler Inhale 1-2 puffs into the lungs every 6 (six) hours as needed for Shortness of Breath.  Rescue 3/31/25 3/31/26  Addi Hernandez, FNP-BC   blood sugar diagnostic Strp To check BG 3 times daily, to use with insurance preferred meter 10/17/23   Supriya Rowell NP   blood-glucose meter kit To check BG 3 times daily, to use with insurance preferred meter 10/17/23   Supriya Rowell NP   cromolyn (OPTICROM) 4 % ophthalmic solution Place 1 drop into both eyes 4 (four) times daily. 6/30/25   Mike Morley MD   ezetimibe (ZETIA) 10 mg tablet Take 1 tablet (10 mg total) by mouth once daily. 7/22/25 7/22/26  Nishi Amos, ROSAURA-C   lancets Misc To check BG 3 times daily, to use with insurance preferred meter 10/17/23   Supriya Rowell NP         History  Past Medical History:   Diagnosis Date    Allergy     Arthritis     Coronary artery disease     Diabetes mellitus, type 2     Hyperlipidemia     Hypertension     Stroke      Past Surgical History:   Procedure Laterality Date    CAROTID ENDARTERECTOMY      COLONOSCOPY N/A 12/13/2018    Procedure: COLONOSCOPY no thinners just ASA  ;  Surgeon: Tomás Grimm MD;  Location: [facility] ENDO;  Service: General;  Laterality: N/A;    COLONOSCOPY W/ POLYPECTOMY  12/13/2018    CORONARY ANGIOPLASTY WITH STENT PLACEMENT  05/04/2020    per  stent x2 and ballon angioplasty     CORONARY ANGIOPLASTY WITH STENT PLACEMENT Right 5/4/2020    Procedure: Angioplasty, Coronary Artery, With Stent Insertion;  Surgeon: Jesus Ricardo MD;  Location: [facility] CATH LAB;  Service: Cardiology;  Laterality: Right;    CORONARY STENT PLACEMENT      LEFT HEART CATHETERIZATION N/A 5/4/2020    Procedure: Left heart cath;  Surgeon: Jesus Ricardo MD;  Location: [facility] CATH LAB;  Service: Cardiology;  Laterality: N/A;    LEG AMPUTATION      STOMACH SURGERY      WRIST SURGERY       Social History[3]      Allergies  Review of patient's allergies indicates:  No Known Allergies      Review of Systems   ROS      Physical Exam  Wt Readings from Last 1 Encounters:   07/22/25 54.4 kg (120 lb)     BP  Readings from Last 3 Encounters:   07/22/25 100/66   06/30/25 130/70   03/31/25 120/60     Pulse Readings from Last 1 Encounters:   07/22/25 83     Body mass index is 16.27 kg/m².    Physical Exam  HENT:      Head: Normocephalic and atraumatic.      Mouth/Throat:      Mouth: Mucous membranes are moist.   Eyes:      Extraocular Movements: Extraocular movements intact.      Pupils: Pupils are equal, round, and reactive to light.   Neck:      Vascular: No carotid bruit.   Cardiovascular:      Heart sounds: No murmur heard.     No friction rub. No gallop.   Pulmonary:      Effort: Pulmonary effort is normal. No respiratory distress.   Abdominal:      General: Abdomen is flat.      Palpations: Abdomen is soft.   Musculoskeletal:      Right lower leg: No edema.      Left lower leg: No edema.   Skin:     General: Skin is warm.      Capillary Refill: Capillary refill takes less than 2 seconds.   Neurological:      General: No focal deficit present.      Mental Status: He is alert.   Psychiatric:         Mood and Affect: Mood normal.           Assessment & Plan    I50.32 Chronic diastolic congestive heart failure  J44.9 Chronic obstructive pulmonary disease, unspecified COPD type  I10 Benign essential HTN  Z86.73 History of stroke  E78.2 Mixed hyperlipidemia  I25.10 Coronary artery disease involving native coronary artery of native heart without angina pectoris  Z95.5 S/P drug eluting coronary stent placement  I73.9 PAD (peripheral artery disease)  I65.21 Asymptomatic stenosis of right carotid artery  E11.65, Z79.4 Type 2 diabetes mellitus with hyperglycemia, with long-term current use of insulin  K21.9 Gastroesophageal reflux disease without esophagitis  F17.210 Cigarette nicotine dependence without complication  Z01.818 Preoperative clearance  E78.5 Hyperlipidemia LDL goal <70    IMPRESSION:  - LDL cholesterol elevated at 121, target is <70 for secondary prevention.  - Cleared patient for colonoscopy from cardiac  standpoint.  - Discontinued Plavix for a 5 days before colonoscopy; specific instructions to be provided by colonoscopy team.    MIXED HYPERLIPIDEMIA:  - Added Zetia to be taken in addition to current maximum dose statin therapy to further reduce LDL and prevent atherosclerosis; discussed its role in working synergistically with statin medication to reduce cholesterol levels.  - Ordered repeat lipid panel in 3 months (fasting lab).  - Follow up in 3 months to review lipid panel results and assess effectiveness of Zetia; follow up less frequently if cholesterol is well-controlled after 3-month follow-up.    S/P DRUG ELUTING CORONARY STENT PLACEMENT:  - Discontinued Plavix for a 5 days before colonoscopy; specific instructions to be provided by colonoscopy team.    HYPERLIPIDEMIA LDL GOAL <70:  - Added Zetia to be taken in addition to current maximum dose statin therapy to further reduce LDL and prevent atherosclerosis; discussed its role in working synergistically with statin medication to reduce cholesterol levels.  - Ordered repeat lipid panel in 3 months (fasting lab).  - Follow up in 3 months to review lipid panel results and assess effectiveness of Zetia; follow up less frequently if cholesterol is well-controlled after 3-month follow-up.     PREOP CLEARANCE:  Pt has no active cardiac condition (ACS/USA, decompenstated CHF, significant arrhythmias or severe valvular disease). No further cardiac work up required prior to  undergoing the surgical procedure.  These recommendations follow the 2014 ACC/AHA Guideline on Perioperative Cardiovascular Evaluation and Management of Patients Undergoing Noncardiac Surgery. (JACC Vol. 64 No. 22, Dec 9, 2014, pp l72-c096).             Brandi R. Carter, FNP-C [facility] - Cardiology.      Total professional time spent for the encounter: 45 minutes  Time was spent preparing to see the patient, reviewing results of prior testing, obtaining and/or reviewing separately obtained  history, performing a medically appropriate examination and interview, counseling and educating the patient/family, ordering medications/tests/procedures, referring and communicating with other health care professionals, documenting clinical information in the electronic health record, and independently interpreting results.    This note was generated with the assistance of ambient listening technology. Verbal consent was obtained by the patient and accompanying visitor(s) for the recording of patient appointment to facilitate this note. I attest to having reviewed and edited the generated note for accuracy, though some syntax or spelling errors may persist. Please contact the author of this note for any clarification.         [1]   Patient Active Problem List  Diagnosis    Type 2 diabetes mellitus with hyperlipidemia    Essential hypertension    Hyperlipidemia    Coronary artery disease    S/P drug eluting coronary stent placement    Chronic diastolic congestive heart failure    Neuropathy    B12 deficiency    Moderate episode of recurrent major depressive disorder    Anxiety    Cigarette nicotine dependence without complication    DDD (degenerative disc disease), lumbar    S/P BKA (below knee amputation) bilateral    Posterior vitreous detachment of both eyes    PAD (peripheral artery disease)    Microalbuminuria due to type 2 diabetes mellitus    COPD (chronic obstructive pulmonary disease)    Impaired mobility    Type 2 diabetes mellitus with hyperglycemia, with long-term current use of insulin    Medical non-compliance    Gastroesophageal reflux disease without esophagitis    Asymptomatic stenosis of right carotid artery: 86% right CCA, by US 7/17/17    Bilateral carotid bruits    History of stroke    Type 2 diabetes mellitus with both eyes affected by mild nonproliferative retinopathy without macular edema, without long-term current use of insulin    Rheumatoid arthritis of other site, unspecified whether  rheumatoid factor present    Benign prostatic hyperplasia with incomplete bladder emptying    Skin cancer of arm, left    Allergic conjunctivitis of both eyes   [2]   Current Outpatient Medications   Medication Sig Dispense Refill    aspirin (ECOTRIN) 81 MG EC tablet Take 81 mg by mouth once daily.      atorvastatin (LIPITOR) 80 MG tablet Take 1 tablet (80 mg total) by mouth once daily. 90 tablet 1    baclofen (LIORESAL) 20 MG tablet Take 20 mg by mouth 5 (five) times daily. Takes 5 times daily. Total of 120 mg  1    carvediloL (COREG) 6.25 MG tablet Take 1 tablet (6.25 mg total) by mouth 2 (two) times daily with meals. 180 tablet 1    clopidogreL (PLAVIX) 75 mg tablet Take 1 tablet (75 mg total) by mouth once daily. 90 tablet 1    DULoxetine (CYMBALTA) 60 MG capsule Take 1 capsule (60 mg total) by mouth once daily. 90 capsule 1    empagliflozin (JARDIANCE) 25 mg tablet Take 1 tablet (25 mg total) by mouth once daily. 90 tablet 1    finasteride (PROSCAR) 5 mg tablet Take 1 tablet (5 mg total) by mouth once daily. 90 tablet 3    folic acid (FOLVITE) 1 MG tablet TAKE ONE TABLET BY MOUTH DAILY 30 tablet 0    hydrocodone-acetaminophen 10-325mg (NORCO)  mg Tab Take 1 tablet by mouth every 6 (six) hours as needed.   0    lisinopriL (PRINIVIL,ZESTRIL) 20 MG tablet Take 1 tablet (20 mg total) by mouth once daily. 90 tablet 1    metFORMIN (GLUCOPHAGE) 1000 MG tablet Take 1 tablet (1,000 mg total) by mouth 2 (two) times daily with meals. 180 tablet 1    nitroGLYCERIN (NITROSTAT) 0.4 MG SL tablet Place 1 tablet (0.4 mg total) under the tongue every 5 (five) minutes as needed for Chest pain. 14 tablet 6    OZEMPIC 1 mg/dose (4 mg/3 mL) INJECT ONE MG into THE SKIN EVERY 7 DAYS 9 mL 0    tamsulosin (FLOMAX) 0.4 mg Cap Take 2 capsules (0.8 mg total) by mouth once daily. 180 capsule 1    albuterol (PROVENTIL/VENTOLIN HFA) 90 mcg/actuation inhaler Inhale 1-2 puffs into the lungs every 6 (six) hours as needed for Shortness of  Breath. Rescue 17 g 0    blood sugar diagnostic Strp To check BG 3 times daily, to use with insurance preferred meter 100 each 11    blood-glucose meter kit To check BG 3 times daily, to use with insurance preferred meter 1 each 0    cromolyn (OPTICROM) 4 % ophthalmic solution Place 1 drop into both eyes 4 (four) times daily. 10 mL 5    ezetimibe (ZETIA) 10 mg tablet Take 1 tablet (10 mg total) by mouth once daily. 90 tablet 3    lancets Misc To check BG 3 times daily, to use with insurance preferred meter 100 each 11     No current facility-administered medications for this visit.   [3]   Social History  Socioeconomic History    Marital status:    Tobacco Use    Smoking status: Every Day     Current packs/day: 2.00     Average packs/day: 2.0 packs/day for 48.6 years (97.1 ttl pk-yrs)     Types: Cigarettes     Start date: 1977     Passive exposure: Current    Smokeless tobacco: Never   Substance and Sexual Activity    Alcohol use: No    Drug use: No    Sexual activity: Never     Social Drivers of Health     Food Insecurity: No Food Insecurity (10/3/2024)    Received from [facility]    Hunger Vital Sign     Worried About Running Out of Food in the Last Year: Never true     Ran Out of Food in the Last Year: Never true   Transportation Needs: No Transportation Needs (10/3/2024)    Received from [facility]    PRAPARE - Transportation     Lack of Transportation (Medical): No     Lack of Transportation (Non-Medical): No   Housing Stability: Low Risk  (10/3/2024)    Received from [facility]    Housing Stability Vital Sign     Unable to Pay for Housing in the Last Year: No     Number of Times Moved in the Last Year: 1     Homeless in the Last Year: No

## 2025-08-11 PROBLEM — N40.1 BENIGN PROSTATIC HYPERPLASIA WITH INCOMPLETE BLADDER EMPTYING: Status: RESOLVED | Noted: 2025-06-30 | Resolved: 2025-08-11

## 2025-08-11 PROBLEM — R39.14 BENIGN PROSTATIC HYPERPLASIA WITH INCOMPLETE BLADDER EMPTYING: Status: RESOLVED | Noted: 2025-06-30 | Resolved: 2025-08-11

## 2025-08-29 PROBLEM — G93.40 ENCEPHALOPATHY, UNSPECIFIED TYPE: Status: ACTIVE | Noted: 2025-08-29

## 2025-09-02 ENCOUNTER — PATIENT OUTREACH (OUTPATIENT)
Dept: ADMINISTRATIVE | Facility: CLINIC | Age: 70
End: 2025-09-02
Payer: MEDICARE